# Patient Record
Sex: FEMALE | Race: WHITE | NOT HISPANIC OR LATINO | URBAN - METROPOLITAN AREA
[De-identification: names, ages, dates, MRNs, and addresses within clinical notes are randomized per-mention and may not be internally consistent; named-entity substitution may affect disease eponyms.]

---

## 2017-03-14 ENCOUNTER — OFFICE (AMBULATORY)
Dept: URBAN - METROPOLITAN AREA CLINIC 64 | Facility: CLINIC | Age: 37
End: 2017-03-14
Payer: COMMERCIAL

## 2017-03-14 VITALS
DIASTOLIC BLOOD PRESSURE: 94 MMHG | SYSTOLIC BLOOD PRESSURE: 149 MMHG | HEIGHT: 61 IN | WEIGHT: 135 LBS | HEART RATE: 80 BPM

## 2017-03-14 DIAGNOSIS — R10.84 GENERALIZED ABDOMINAL PAIN: ICD-10-CM

## 2017-03-14 DIAGNOSIS — K21.9 GASTRO-ESOPHAGEAL REFLUX DISEASE WITHOUT ESOPHAGITIS: ICD-10-CM

## 2017-03-14 DIAGNOSIS — R11.2 NAUSEA WITH VOMITING, UNSPECIFIED: ICD-10-CM

## 2017-03-14 PROCEDURE — 99213 OFFICE O/P EST LOW 20 MIN: CPT

## 2017-03-14 RX ORDER — METOCLOPRAMIDE 5 MG/1
TABLET ORAL
Qty: 90 | Status: COMPLETED
End: 2017-03-14

## 2017-03-14 RX ORDER — OMEPRAZOLE 40 MG/1
CAPSULE, DELAYED RELEASE ORAL
Qty: 30 | Refills: 0 | Status: COMPLETED
End: 2020-02-07

## 2017-03-14 RX ORDER — AMITRIPTYLINE HYDROCHLORIDE 25 MG/1
25 TABLET, FILM COATED ORAL
Qty: 30 | Refills: 11 | Status: COMPLETED
Start: 2017-03-14 | End: 2018-01-11

## 2017-03-29 LAB — OCCULT BLOOD, FECAL, IA: NEGATIVE

## 2017-04-06 ENCOUNTER — CONVERSION ENCOUNTER (OUTPATIENT)
Dept: FAMILY MEDICINE CLINIC | Facility: CLINIC | Age: 37
End: 2017-04-06

## 2017-04-10 ENCOUNTER — HOSPITAL ENCOUNTER (OUTPATIENT)
Dept: LAB | Facility: HOSPITAL | Age: 37
Discharge: HOME | End: 2017-04-10
Attending: PODIATRIST | Admitting: PODIATRIST

## 2017-04-10 LAB
BASOPHILS # BLD AUTO: 0 10*3/UL (ref 0–0.2)
BASOPHILS NFR BLD AUTO: 0 % (ref 0–2)
DIFFERENTIAL METHOD BLD: (no result)
EOSINOPHIL # BLD AUTO: 0 % (ref 0–3)
EOSINOPHIL # BLD AUTO: 0.1 10*3/UL (ref 0–0.3)
ERYTHROCYTE [DISTWIDTH] IN BLOOD BY AUTOMATED COUNT: 14.5 % (ref 11.5–14.5)
ERYTHROCYTE [SEDIMENTATION RATE] IN BLOOD BY WESTERGREN METHOD: 38 MM/HR (ref 0–20)
HCT VFR BLD AUTO: 41.2 % (ref 35–49)
HGB BLD-MCNC: 13.7 G/DL (ref 12–15)
LYMPHOCYTES # BLD AUTO: 3.3 10*3/UL (ref 0.8–4.8)
LYMPHOCYTES NFR BLD AUTO: 23 % (ref 18–42)
MCH RBC QN AUTO: 29.6 PG (ref 26–32)
MCHC RBC AUTO-ENTMCNC: 33.1 G/DL (ref 32–36)
MCV RBC AUTO: 89.4 FL (ref 80–94)
MONOCYTES # BLD AUTO: 1.4 10*3/UL (ref 0.1–1.3)
MONOCYTES NFR BLD AUTO: 10 % (ref 2–11)
NEUTROPHILS # BLD AUTO: 9.4 10*3/UL (ref 2.3–8.6)
NEUTROPHILS NFR BLD AUTO: 67 % (ref 50–75)
NRBC BLD AUTO-RTO: 0 /100{WBCS}
NRBC/RBC NFR BLD MANUAL: 0 10*3/UL
PLATELET # BLD AUTO: 397 10*3/UL (ref 150–450)
PMV BLD AUTO: 8.1 FL (ref 7.4–10.4)
RBC # BLD AUTO: 4.61 10*6/UL (ref 4–5.4)
URATE SERPL-MCNC: 3.2 MG/DL (ref 2.6–8)
WBC # BLD AUTO: 14.2 10*3/UL (ref 4.5–11.5)

## 2017-05-16 ENCOUNTER — HOSPITAL ENCOUNTER (OUTPATIENT)
Dept: HOME HEALTH SERVICES | Facility: HOME HEALTHCARE | Age: 37
Setting detail: SPECIMEN
Discharge: HOME | End: 2017-05-16
Attending: INTERNAL MEDICINE | Admitting: INTERNAL MEDICINE

## 2017-05-16 LAB
ANION GAP SERPL CALC-SCNC: 15.4 MMOL/L (ref 10–20)
BASOPHILS # BLD AUTO: 0.1 10*3/UL (ref 0–0.2)
BASOPHILS NFR BLD AUTO: 1 % (ref 0–2)
BUN SERPL-MCNC: 4 MG/DL (ref 8–20)
BUN/CREAT SERPL: 8 (ref 5.4–26.2)
CALCIUM SERPL-MCNC: 9.2 MG/DL (ref 8.9–10.3)
CHLORIDE SERPL-SCNC: 106 MMOL/L (ref 101–111)
CONV CO2: 24 MMOL/L (ref 22–32)
CREAT UR-MCNC: 0.5 MG/DL (ref 0.4–1)
CRP SERPL-MCNC: 0.09 MG/DL (ref 0–0.7)
DIFFERENTIAL METHOD BLD: (no result)
EOSINOPHIL # BLD AUTO: 0.1 10*3/UL (ref 0–0.3)
EOSINOPHIL # BLD AUTO: 1 % (ref 0–3)
ERYTHROCYTE [DISTWIDTH] IN BLOOD BY AUTOMATED COUNT: 15.5 % (ref 11.5–14.5)
ERYTHROCYTE [SEDIMENTATION RATE] IN BLOOD BY WESTERGREN METHOD: 30 MM/HR (ref 0–20)
GLUCOSE SERPL-MCNC: 70 MG/DL (ref 65–99)
HCT VFR BLD AUTO: 35.6 % (ref 35–49)
HGB BLD-MCNC: 12 G/DL (ref 12–15)
LYMPHOCYTES # BLD AUTO: 2.9 10*3/UL (ref 0.8–4.8)
LYMPHOCYTES NFR BLD AUTO: 27 % (ref 18–42)
MCH RBC QN AUTO: 30.5 PG (ref 26–32)
MCHC RBC AUTO-ENTMCNC: 33.7 G/DL (ref 32–36)
MCV RBC AUTO: 90.6 FL (ref 80–94)
MONOCYTES # BLD AUTO: 1.2 10*3/UL (ref 0.1–1.3)
MONOCYTES NFR BLD AUTO: 11 % (ref 2–11)
NEUTROPHILS # BLD AUTO: 6.4 10*3/UL (ref 2.3–8.6)
NEUTROPHILS NFR BLD AUTO: 60 % (ref 50–75)
NRBC BLD AUTO-RTO: 0 /100{WBCS}
NRBC/RBC NFR BLD MANUAL: 0 10*3/UL
PLATELET # BLD AUTO: (no result) 10*3/UL (ref 150–450)
PMV BLD AUTO: 9.3 FL (ref 7.4–10.4)
POTASSIUM SERPL-SCNC: 3.4 MMOL/L (ref 3.6–5.1)
RBC # BLD AUTO: 3.93 10*6/UL (ref 4–5.4)
SODIUM SERPL-SCNC: 142 MMOL/L (ref 136–144)
WBC # BLD AUTO: 10.6 10*3/UL (ref 4.5–11.5)

## 2017-05-22 ENCOUNTER — CONVERSION ENCOUNTER (OUTPATIENT)
Dept: FAMILY MEDICINE CLINIC | Facility: CLINIC | Age: 37
End: 2017-05-22

## 2017-05-23 ENCOUNTER — HOSPITAL ENCOUNTER (OUTPATIENT)
Dept: HOME HEALTH SERVICES | Facility: HOME HEALTHCARE | Age: 37
Setting detail: SPECIMEN
Discharge: HOME | End: 2017-05-23
Attending: INTERNAL MEDICINE | Admitting: INTERNAL MEDICINE

## 2017-05-23 LAB
ANION GAP SERPL CALC-SCNC: 13.4 MMOL/L (ref 10–20)
BASOPHILS # BLD AUTO: 0 10*3/UL (ref 0–0.2)
BASOPHILS NFR BLD AUTO: 1 % (ref 0–2)
BUN SERPL-MCNC: <1 MG/DL (ref 8–20)
BUN/CREAT SERPL: ABNORMAL (ref 5.4–26.2)
CALCIUM SERPL-MCNC: 8.8 MG/DL (ref 8.9–10.3)
CHLORIDE SERPL-SCNC: 107 MMOL/L (ref 101–111)
CONV CO2: 25 MMOL/L (ref 22–32)
CREAT UR-MCNC: 0.4 MG/DL (ref 0.4–1)
CRP SERPL-MCNC: 0.11 MG/DL (ref 0–0.7)
DIFFERENTIAL METHOD BLD: (no result)
EOSINOPHIL # BLD AUTO: 0.1 10*3/UL (ref 0–0.3)
EOSINOPHIL # BLD AUTO: 1 % (ref 0–3)
ERYTHROCYTE [DISTWIDTH] IN BLOOD BY AUTOMATED COUNT: 15.8 % (ref 11.5–14.5)
ERYTHROCYTE [SEDIMENTATION RATE] IN BLOOD BY WESTERGREN METHOD: 17 MM/HR (ref 0–20)
GLUCOSE SERPL-MCNC: 79 MG/DL (ref 65–99)
HCT VFR BLD AUTO: 36.5 % (ref 35–49)
HGB BLD-MCNC: 12.3 G/DL (ref 12–15)
LYMPHOCYTES # BLD AUTO: 2.2 10*3/UL (ref 0.8–4.8)
LYMPHOCYTES NFR BLD AUTO: 33 % (ref 18–42)
MCH RBC QN AUTO: 30.7 PG (ref 26–32)
MCHC RBC AUTO-ENTMCNC: 33.7 G/DL (ref 32–36)
MCV RBC AUTO: 91.1 FL (ref 80–94)
MONOCYTES # BLD AUTO: 0.7 10*3/UL (ref 0.1–1.3)
MONOCYTES NFR BLD AUTO: 11 % (ref 2–11)
NEUTROPHILS # BLD AUTO: 3.7 10*3/UL (ref 2.3–8.6)
NEUTROPHILS NFR BLD AUTO: 54 % (ref 50–75)
NRBC BLD AUTO-RTO: 0 /100{WBCS}
NRBC/RBC NFR BLD MANUAL: 0 10*3/UL
PLATELET # BLD AUTO: 198 10*3/UL (ref 150–450)
PMV BLD AUTO: 9.7 FL (ref 7.4–10.4)
POTASSIUM SERPL-SCNC: 3.4 MMOL/L (ref 3.6–5.1)
RBC # BLD AUTO: 4.01 10*6/UL (ref 4–5.4)
SODIUM SERPL-SCNC: 142 MMOL/L (ref 136–144)
WBC # BLD AUTO: 6.8 10*3/UL (ref 4.5–11.5)

## 2017-05-30 ENCOUNTER — HOSPITAL ENCOUNTER (OUTPATIENT)
Dept: HOME HEALTH SERVICES | Facility: HOME HEALTHCARE | Age: 37
Setting detail: SPECIMEN
Discharge: HOME | End: 2017-05-30
Attending: INTERNAL MEDICINE | Admitting: INTERNAL MEDICINE

## 2017-05-30 LAB
ANION GAP SERPL CALC-SCNC: 16.2 MMOL/L (ref 10–20)
BASOPHILS # BLD AUTO: 0 10*3/UL (ref 0–0.2)
BASOPHILS NFR BLD AUTO: 0 % (ref 0–2)
BUN SERPL-MCNC: 5 MG/DL (ref 8–20)
BUN/CREAT SERPL: 10 (ref 5.4–26.2)
CALCIUM SERPL-MCNC: 9 MG/DL (ref 8.9–10.3)
CHLORIDE SERPL-SCNC: 107 MMOL/L (ref 101–111)
CONV CO2: 24 MMOL/L (ref 22–32)
CREAT UR-MCNC: 0.5 MG/DL (ref 0.4–1)
CRP SERPL-MCNC: 0.08 MG/DL (ref 0–0.7)
DIFFERENTIAL METHOD BLD: (no result)
EOSINOPHIL # BLD AUTO: 0.1 10*3/UL (ref 0–0.3)
EOSINOPHIL # BLD AUTO: 2 % (ref 0–3)
ERYTHROCYTE [DISTWIDTH] IN BLOOD BY AUTOMATED COUNT: 15.7 % (ref 11.5–14.5)
ERYTHROCYTE [SEDIMENTATION RATE] IN BLOOD BY WESTERGREN METHOD: 21 MM/HR (ref 0–20)
GLUCOSE SERPL-MCNC: 105 MG/DL (ref 65–99)
HCT VFR BLD AUTO: 36.2 % (ref 35–49)
HGB BLD-MCNC: 12.1 G/DL (ref 12–15)
LYMPHOCYTES # BLD AUTO: 2.2 10*3/UL (ref 0.8–4.8)
LYMPHOCYTES NFR BLD AUTO: 36 % (ref 18–42)
MCH RBC QN AUTO: 30.8 PG (ref 26–32)
MCHC RBC AUTO-ENTMCNC: 33.5 G/DL (ref 32–36)
MCV RBC AUTO: 92 FL (ref 80–94)
MONOCYTES # BLD AUTO: 0.6 10*3/UL (ref 0.1–1.3)
MONOCYTES NFR BLD AUTO: 9 % (ref 2–11)
NEUTROPHILS # BLD AUTO: 3.2 10*3/UL (ref 2.3–8.6)
NEUTROPHILS NFR BLD AUTO: 53 % (ref 50–75)
NRBC BLD AUTO-RTO: 0 /100{WBCS}
NRBC/RBC NFR BLD MANUAL: 0 10*3/UL
PLATELET # BLD AUTO: 202 10*3/UL (ref 150–450)
PMV BLD AUTO: 9.1 FL (ref 7.4–10.4)
POTASSIUM SERPL-SCNC: 3.2 MMOL/L (ref 3.6–5.1)
RBC # BLD AUTO: 3.94 10*6/UL (ref 4–5.4)
SODIUM SERPL-SCNC: 144 MMOL/L (ref 136–144)
WBC # BLD AUTO: 6.1 10*3/UL (ref 4.5–11.5)

## 2017-06-06 ENCOUNTER — HOSPITAL ENCOUNTER (OUTPATIENT)
Dept: HOME HEALTH SERVICES | Facility: HOME HEALTHCARE | Age: 37
Setting detail: SPECIMEN
Discharge: HOME | End: 2017-06-06
Attending: INTERNAL MEDICINE | Admitting: INTERNAL MEDICINE

## 2017-06-06 LAB
ANION GAP SERPL CALC-SCNC: 14.6 MMOL/L (ref 10–20)
BASOPHILS # BLD AUTO: 0 10*3/UL (ref 0–0.2)
BASOPHILS NFR BLD AUTO: 0 % (ref 0–2)
BUN SERPL-MCNC: 2 MG/DL (ref 8–20)
BUN/CREAT SERPL: 5 (ref 5.4–26.2)
CALCIUM SERPL-MCNC: 9.1 MG/DL (ref 8.9–10.3)
CHLORIDE SERPL-SCNC: 106 MMOL/L (ref 101–111)
CONV CO2: 25 MMOL/L (ref 22–32)
CREAT UR-MCNC: 0.4 MG/DL (ref 0.4–1)
CRP SERPL-MCNC: 0.51 MG/DL (ref 0–0.7)
DIFFERENTIAL METHOD BLD: (no result)
EOSINOPHIL # BLD AUTO: 0.1 10*3/UL (ref 0–0.3)
EOSINOPHIL # BLD AUTO: 1 % (ref 0–3)
ERYTHROCYTE [DISTWIDTH] IN BLOOD BY AUTOMATED COUNT: 15.4 % (ref 11.5–14.5)
ERYTHROCYTE [SEDIMENTATION RATE] IN BLOOD BY WESTERGREN METHOD: 25 MM/HR (ref 0–20)
GLUCOSE SERPL-MCNC: 85 MG/DL (ref 65–99)
HCT VFR BLD AUTO: 36.6 % (ref 35–49)
HGB BLD-MCNC: 12.2 G/DL (ref 12–15)
LYMPHOCYTES # BLD AUTO: 2.1 10*3/UL (ref 0.8–4.8)
LYMPHOCYTES NFR BLD AUTO: 19 % (ref 18–42)
MCH RBC QN AUTO: 30.4 PG (ref 26–32)
MCHC RBC AUTO-ENTMCNC: 33.3 G/DL (ref 32–36)
MCV RBC AUTO: 91.3 FL (ref 80–94)
MONOCYTES # BLD AUTO: 0.9 10*3/UL (ref 0.1–1.3)
MONOCYTES NFR BLD AUTO: 8 % (ref 2–11)
NEUTROPHILS # BLD AUTO: 7.8 10*3/UL (ref 2.3–8.6)
NEUTROPHILS NFR BLD AUTO: 72 % (ref 50–75)
NRBC BLD AUTO-RTO: 0 /100{WBCS}
NRBC/RBC NFR BLD MANUAL: 0 10*3/UL
PLATELET # BLD AUTO: 239 10*3/UL (ref 150–450)
PMV BLD AUTO: 9.1 FL (ref 7.4–10.4)
POTASSIUM SERPL-SCNC: 3.6 MMOL/L (ref 3.6–5.1)
RBC # BLD AUTO: 4.01 10*6/UL (ref 4–5.4)
SODIUM SERPL-SCNC: 142 MMOL/L (ref 136–144)
WBC # BLD AUTO: 10.9 10*3/UL (ref 4.5–11.5)

## 2017-11-09 ENCOUNTER — OFFICE (AMBULATORY)
Dept: URBAN - METROPOLITAN AREA CLINIC 64 | Facility: CLINIC | Age: 37
End: 2017-11-09
Payer: COMMERCIAL

## 2017-11-09 VITALS
HEIGHT: 61 IN | SYSTOLIC BLOOD PRESSURE: 142 MMHG | DIASTOLIC BLOOD PRESSURE: 100 MMHG | HEART RATE: 80 BPM | WEIGHT: 137 LBS

## 2017-11-09 DIAGNOSIS — R10.84 GENERALIZED ABDOMINAL PAIN: ICD-10-CM

## 2017-11-09 DIAGNOSIS — R11.2 NAUSEA WITH VOMITING, UNSPECIFIED: ICD-10-CM

## 2017-11-09 PROCEDURE — 99213 OFFICE O/P EST LOW 20 MIN: CPT | Performed by: NURSE PRACTITIONER

## 2017-11-09 RX ORDER — METOCLOPRAMIDE HYDROCHLORIDE 5 MG/1
10 TABLET ORAL
Qty: 60 | Refills: 3 | Status: COMPLETED
Start: 2017-11-09 | End: 2018-01-11

## 2017-11-09 RX ORDER — ONDANSETRON 4 MG/1
TABLET, ORALLY DISINTEGRATING ORAL
Qty: 60 | Refills: 0 | Status: COMPLETED
End: 2020-02-07

## 2017-11-09 RX ORDER — AMITRIPTYLINE HYDROCHLORIDE 25 MG/1
TABLET, FILM COATED ORAL
Qty: 30 | Refills: 0 | Status: COMPLETED
End: 2020-02-07

## 2018-01-11 ENCOUNTER — OFFICE (AMBULATORY)
Dept: URBAN - METROPOLITAN AREA CLINIC 64 | Facility: CLINIC | Age: 38
End: 2018-01-11
Payer: COMMERCIAL

## 2018-01-11 VITALS
WEIGHT: 145 LBS | HEIGHT: 61 IN | DIASTOLIC BLOOD PRESSURE: 74 MMHG | SYSTOLIC BLOOD PRESSURE: 114 MMHG | HEART RATE: 58 BPM

## 2018-01-11 DIAGNOSIS — K21.9 GASTRO-ESOPHAGEAL REFLUX DISEASE WITHOUT ESOPHAGITIS: ICD-10-CM

## 2018-01-11 DIAGNOSIS — R10.84 GENERALIZED ABDOMINAL PAIN: ICD-10-CM

## 2018-01-11 PROCEDURE — 99213 OFFICE O/P EST LOW 20 MIN: CPT

## 2018-01-11 RX ORDER — METOCLOPRAMIDE 5 MG/1
10 TABLET ORAL
Qty: 60 | Refills: 0 | Status: ACTIVE

## 2018-01-23 ENCOUNTER — HOSPITAL ENCOUNTER (OUTPATIENT)
Dept: OTHER | Facility: HOSPITAL | Age: 38
Discharge: HOME | End: 2018-01-23
Attending: FAMILY MEDICINE | Admitting: FAMILY MEDICINE

## 2018-01-30 ENCOUNTER — HOSPITAL ENCOUNTER (OUTPATIENT)
Dept: LAB | Facility: HOSPITAL | Age: 38
Discharge: HOME | End: 2018-01-30
Attending: FAMILY MEDICINE | Admitting: FAMILY MEDICINE

## 2018-01-30 LAB
ALBUMIN SERPL-MCNC: 3.5 G/DL (ref 3.5–4.8)
ALP SERPL-CCNC: 81 IU/L (ref 32–91)
ALT SERPL-CCNC: 19 IU/L (ref 14–54)
ANION GAP SERPL CALC-SCNC: 10.8 MMOL/L (ref 10–20)
AST SERPL-CCNC: 18 IU/L (ref 15–41)
BACTERIA SPEC AEROBE CULT: NORMAL
BASOPHILS # BLD AUTO: 0 10*3/UL (ref 0–0.2)
BASOPHILS NFR BLD AUTO: 0 % (ref 0–2)
BILIRUB DIRECT SERPL-MCNC: 0 MG/DL (ref 0.1–0.5)
BILIRUB SERPL-MCNC: 0.3 MG/DL (ref 0.3–1.2)
BILIRUB UR QL STRIP: NEGATIVE MG/DL
BUN SERPL-MCNC: 4 MG/DL (ref 8–20)
BUN/CREAT SERPL: 6.7 (ref 5.4–26.2)
CALCIUM SERPL-MCNC: 9.2 MG/DL (ref 8.9–10.3)
CASTS URNS QL MICRO: ABNORMAL /[LPF]
CHLORIDE SERPL-SCNC: 107 MMOL/L (ref 101–111)
CHOLEST SERPL-MCNC: 234 MG/DL
CHOLEST/HDLC SERPL: 5.6 {RATIO}
COLOR UR: ABNORMAL
CONV BACTERIA IN URINE MICRO: ABNORMAL
CONV CLARITY OF URINE: ABNORMAL
CONV CO2: 25 MMOL/L (ref 22–32)
CONV HYALINE CASTS IN URINE MICRO: ABNORMAL /[LPF] (ref 0–5)
CONV LDL CHOLESTEROL DIRECT: 178 MG/DL (ref 0–100)
CONV PROTEIN IN URINE BY AUTOMATED TEST STRIP: NEGATIVE MG/DL
CONV SMALL ROUND CELLS: ABNORMAL /[HPF]
CONV TOTAL PROTEIN: 6.1 G/DL (ref 6.1–7.9)
CONV UROBILINOGEN IN URINE BY AUTOMATED TEST STRIP: 0.2 MG/DL
CREAT UR-MCNC: 0.6 MG/DL (ref 0.4–1)
CRP SERPL-MCNC: 0.74 MG/DL (ref 0–0.7)
CULTURE INDICATED?: ABNORMAL
DIFFERENTIAL METHOD BLD: (no result)
EOSINOPHIL # BLD AUTO: 0.1 10*3/UL (ref 0–0.3)
EOSINOPHIL # BLD AUTO: 1 % (ref 0–3)
ERYTHROCYTE [DISTWIDTH] IN BLOOD BY AUTOMATED COUNT: 16 % (ref 11.5–14.5)
ERYTHROCYTE [SEDIMENTATION RATE] IN BLOOD BY WESTERGREN METHOD: 41 MM/HR (ref 0–20)
GLUCOSE SERPL-MCNC: 95 MG/DL (ref 65–99)
GLUCOSE UR QL: NEGATIVE MG/DL
HCT VFR BLD AUTO: 36 % (ref 35–49)
HDLC SERPL-MCNC: 42 MG/DL
HGB BLD-MCNC: 12 G/DL (ref 12–15)
HGB UR QL STRIP: NEGATIVE
KETONES UR QL STRIP: NEGATIVE MG/DL
LDLC/HDLC SERPL: 4.2 {RATIO}
LEUKOCYTE ESTERASE UR QL STRIP: NEGATIVE
LIPID INTERPRETATION: ABNORMAL
LYMPHOCYTES # BLD AUTO: 2.2 10*3/UL (ref 0.8–4.8)
LYMPHOCYTES NFR BLD AUTO: 27 % (ref 18–42)
Lab: NORMAL
MCH RBC QN AUTO: 29.8 PG (ref 26–32)
MCHC RBC AUTO-ENTMCNC: 33.3 G/DL (ref 32–36)
MCV RBC AUTO: 89.7 FL (ref 80–94)
MICRO REPORT STATUS: NORMAL
MONOCYTES # BLD AUTO: 0.5 10*3/UL (ref 0.1–1.3)
MONOCYTES NFR BLD AUTO: 7 % (ref 2–11)
NEUTROPHILS # BLD AUTO: 5 10*3/UL (ref 2.3–8.6)
NEUTROPHILS NFR BLD AUTO: 65 % (ref 50–75)
NITRITE UR QL STRIP: NEGATIVE
NRBC BLD AUTO-RTO: 0 /100{WBCS}
NRBC/RBC NFR BLD MANUAL: 0 10*3/UL
PH UR STRIP.AUTO: 7.5 [PH] (ref 4.5–8)
PLATELET # BLD AUTO: 297 10*3/UL (ref 150–450)
PMV BLD AUTO: 8.2 FL (ref 7.4–10.4)
POTASSIUM SERPL-SCNC: 3.8 MMOL/L (ref 3.6–5.1)
RBC # BLD AUTO: 4.01 10*6/UL (ref 4–5.4)
RBC #/AREA URNS HPF: 1 /[HPF] (ref 0–3)
SODIUM SERPL-SCNC: 139 MMOL/L (ref 136–144)
SP GR UR: 1.01 (ref 1–1.03)
SPECIMEN SOURCE: NORMAL
SPERM URNS QL MICRO: ABNORMAL /[HPF]
SQUAMOUS SPT QL MICRO: 16 /[HPF] (ref 0–5)
TRIGL SERPL-MCNC: 134 MG/DL
UNIDENT CRYS URNS QL MICRO: ABNORMAL /[HPF]
VLDLC SERPL CALC-MCNC: 14.6 MG/DL
WBC # BLD AUTO: 7.8 10*3/UL (ref 4.5–11.5)
WBC #/AREA URNS HPF: 5 /[HPF] (ref 0–5)
YEAST SPEC QL WET PREP: ABNORMAL /[HPF]

## 2018-01-31 LAB
ANA SER QL IA: NORMAL
CHROMATIN AB SERPL-ACNC: <20 [IU]/ML (ref 0–20)

## 2018-02-07 ENCOUNTER — CONVERSION ENCOUNTER (OUTPATIENT)
Dept: FAMILY MEDICINE CLINIC | Facility: CLINIC | Age: 38
End: 2018-02-07

## 2018-02-13 ENCOUNTER — HOSPITAL ENCOUNTER (OUTPATIENT)
Dept: CARDIOLOGY | Facility: HOSPITAL | Age: 38
Discharge: HOME | End: 2018-02-13
Attending: INTERNAL MEDICINE | Admitting: INTERNAL MEDICINE

## 2018-02-14 ENCOUNTER — HOSPITAL ENCOUNTER (OUTPATIENT)
Dept: LAB | Facility: HOSPITAL | Age: 38
Discharge: HOME | End: 2018-02-14
Attending: FAMILY MEDICINE | Admitting: FAMILY MEDICINE

## 2018-02-14 LAB
ANION GAP SERPL CALC-SCNC: 9.9 MMOL/L (ref 10–20)
BACTERIA SPEC AEROBE CULT: NORMAL
BASOPHILS # BLD AUTO: 0 10*3/UL (ref 0–0.2)
BASOPHILS NFR BLD AUTO: 0 % (ref 0–2)
BILIRUB UR QL STRIP: NEGATIVE MG/DL
BUN SERPL-MCNC: 3 MG/DL (ref 8–20)
BUN/CREAT SERPL: 6 (ref 5.4–26.2)
CALCIUM SERPL-MCNC: 9.5 MG/DL (ref 8.9–10.3)
CASTS URNS QL MICRO: ABNORMAL /[LPF]
CHLORIDE SERPL-SCNC: 108 MMOL/L (ref 101–111)
COLOR UR: YELLOW
CONV BACTERIA IN URINE MICRO: ABNORMAL
CONV CLARITY OF URINE: CLEAR
CONV CO2: 26 MMOL/L (ref 22–32)
CONV HYALINE CASTS IN URINE MICRO: 5 /[LPF] (ref 0–5)
CONV PROTEIN IN URINE BY AUTOMATED TEST STRIP: NEGATIVE MG/DL
CONV SMALL ROUND CELLS: ABNORMAL /[HPF]
CONV UROBILINOGEN IN URINE BY AUTOMATED TEST STRIP: 0.2 MG/DL
CREAT UR-MCNC: 0.5 MG/DL (ref 0.4–1)
CULTURE INDICATED?: ABNORMAL
DIFFERENTIAL METHOD BLD: (no result)
EOSINOPHIL # BLD AUTO: 0.1 10*3/UL (ref 0–0.3)
EOSINOPHIL # BLD AUTO: 1 % (ref 0–3)
ERYTHROCYTE [DISTWIDTH] IN BLOOD BY AUTOMATED COUNT: 16.2 % (ref 11.5–14.5)
GLUCOSE SERPL-MCNC: 92 MG/DL (ref 65–99)
GLUCOSE UR QL: NEGATIVE MG/DL
HCT VFR BLD AUTO: 37.4 % (ref 35–49)
HGB BLD-MCNC: 12.4 G/DL (ref 12–15)
HGB UR QL STRIP: NEGATIVE
KETONES UR QL STRIP: NEGATIVE MG/DL
LEUKOCYTE ESTERASE UR QL STRIP: ABNORMAL
LYMPHOCYTES # BLD AUTO: 2.7 10*3/UL (ref 0.8–4.8)
LYMPHOCYTES NFR BLD AUTO: 24 % (ref 18–42)
Lab: NORMAL
MCH RBC QN AUTO: 30 PG (ref 26–32)
MCHC RBC AUTO-ENTMCNC: 33.2 G/DL (ref 32–36)
MCV RBC AUTO: 90.2 FL (ref 80–94)
MICRO REPORT STATUS: NORMAL
MONOCYTES # BLD AUTO: 0.9 10*3/UL (ref 0.1–1.3)
MONOCYTES NFR BLD AUTO: 8 % (ref 2–11)
NEUTROPHILS # BLD AUTO: 7.6 10*3/UL (ref 2.3–8.6)
NEUTROPHILS NFR BLD AUTO: 67 % (ref 50–75)
NITRITE UR QL STRIP: NEGATIVE
NRBC BLD AUTO-RTO: 0 /100{WBCS}
NRBC/RBC NFR BLD MANUAL: 0 10*3/UL
PH UR STRIP.AUTO: 7 [PH] (ref 4.5–8)
PLATELET # BLD AUTO: 249 10*3/UL (ref 150–450)
PMV BLD AUTO: 8.9 FL (ref 7.4–10.4)
POTASSIUM SERPL-SCNC: 3.9 MMOL/L (ref 3.6–5.1)
RBC # BLD AUTO: 4.15 10*6/UL (ref 4–5.4)
RBC #/AREA URNS HPF: 0 /[HPF] (ref 0–3)
SODIUM SERPL-SCNC: 140 MMOL/L (ref 136–144)
SP GR UR: 1 (ref 1–1.03)
SPECIMEN SOURCE: NORMAL
SPERM URNS QL MICRO: ABNORMAL /[HPF]
SQUAMOUS SPT QL MICRO: 3 /[HPF] (ref 0–5)
UNIDENT CRYS URNS QL MICRO: ABNORMAL /[HPF]
WBC # BLD AUTO: 11.3 10*3/UL (ref 4.5–11.5)
WBC #/AREA URNS HPF: 24 /[HPF] (ref 0–5)
YEAST SPEC QL WET PREP: ABNORMAL /[HPF]

## 2018-02-20 ENCOUNTER — CONVERSION ENCOUNTER (OUTPATIENT)
Dept: FAMILY MEDICINE CLINIC | Facility: CLINIC | Age: 38
End: 2018-02-20

## 2019-05-03 ENCOUNTER — HOSPITAL ENCOUNTER (OUTPATIENT)
Dept: GENERAL RADIOLOGY | Facility: HOSPITAL | Age: 39
Discharge: HOME | End: 2019-05-03
Attending: FAMILY MEDICINE | Admitting: FAMILY MEDICINE

## 2019-06-04 VITALS
HEIGHT: 61 IN | BODY MASS INDEX: 26.62 KG/M2 | DIASTOLIC BLOOD PRESSURE: 83 MMHG | SYSTOLIC BLOOD PRESSURE: 122 MMHG | WEIGHT: 141 LBS | HEART RATE: 58 BPM | HEART RATE: 61 BPM | WEIGHT: 136.4 LBS | BODY MASS INDEX: 27.11 KG/M2 | RESPIRATION RATE: 16 BRPM | HEART RATE: 82 BPM | DIASTOLIC BLOOD PRESSURE: 95 MMHG | SYSTOLIC BLOOD PRESSURE: 159 MMHG | DIASTOLIC BLOOD PRESSURE: 72 MMHG | HEART RATE: 84 BPM | WEIGHT: 136 LBS | DIASTOLIC BLOOD PRESSURE: 85 MMHG | SYSTOLIC BLOOD PRESSURE: 109 MMHG | HEIGHT: 61 IN | OXYGEN SATURATION: 97 % | WEIGHT: 143.6 LBS | OXYGEN SATURATION: 96 % | SYSTOLIC BLOOD PRESSURE: 125 MMHG

## 2020-02-07 ENCOUNTER — OFFICE (AMBULATORY)
Dept: URBAN - METROPOLITAN AREA CLINIC 64 | Facility: CLINIC | Age: 40
End: 2020-02-07
Payer: COMMERCIAL

## 2020-02-07 VITALS
DIASTOLIC BLOOD PRESSURE: 93 MMHG | SYSTOLIC BLOOD PRESSURE: 131 MMHG | HEART RATE: 74 BPM | WEIGHT: 148 LBS | HEIGHT: 61 IN

## 2020-02-07 DIAGNOSIS — K21.9 GASTRO-ESOPHAGEAL REFLUX DISEASE WITHOUT ESOPHAGITIS: ICD-10-CM

## 2020-02-07 DIAGNOSIS — R10.30 LOWER ABDOMINAL PAIN, UNSPECIFIED: ICD-10-CM

## 2020-02-07 DIAGNOSIS — R11.2 NAUSEA WITH VOMITING, UNSPECIFIED: ICD-10-CM

## 2020-02-07 PROCEDURE — 99214 OFFICE O/P EST MOD 30 MIN: CPT | Performed by: NURSE PRACTITIONER

## 2020-02-07 RX ORDER — METOCLOPRAMIDE 10 MG/1
TABLET ORAL
Qty: 60 | Refills: 5 | Status: ACTIVE

## 2020-02-07 RX ORDER — OMEPRAZOLE 40 MG/1
CAPSULE, DELAYED RELEASE ORAL
Qty: 90 | Refills: 3 | Status: ACTIVE

## 2020-02-07 RX ORDER — DICYCLOMINE HYDROCHLORIDE 10 MG/1
40 CAPSULE ORAL
Qty: 60 | Refills: 11 | Status: ACTIVE
Start: 2020-02-07

## 2020-02-24 ENCOUNTER — TRANSCRIBE ORDERS (OUTPATIENT)
Dept: ADMINISTRATIVE | Facility: HOSPITAL | Age: 40
End: 2020-02-24

## 2020-02-24 DIAGNOSIS — Z12.31 SCREENING MAMMOGRAM, ENCOUNTER FOR: Primary | ICD-10-CM

## 2020-03-04 ENCOUNTER — APPOINTMENT (OUTPATIENT)
Dept: MAMMOGRAPHY | Facility: HOSPITAL | Age: 40
End: 2020-03-04

## 2020-03-18 ENCOUNTER — HOSPITAL ENCOUNTER (EMERGENCY)
Facility: HOSPITAL | Age: 40
Discharge: HOME | End: 2020-03-18
Admitting: EMERGENCY MEDICINE

## 2020-03-18 VITALS
WEIGHT: 148.81 LBS | OXYGEN SATURATION: 95 % | HEART RATE: 76 BPM | HEIGHT: 61 IN | BODY MASS INDEX: 28.1 KG/M2 | RESPIRATION RATE: 14 BRPM | DIASTOLIC BLOOD PRESSURE: 73 MMHG | SYSTOLIC BLOOD PRESSURE: 141 MMHG | TEMPERATURE: 98.2 F

## 2020-03-18 DIAGNOSIS — K02.9 PAIN DUE TO DENTAL CARIES: Primary | ICD-10-CM

## 2020-03-18 PROCEDURE — 99282 EMERGENCY DEPT VISIT SF MDM: CPT

## 2020-03-18 RX ORDER — OMEPRAZOLE 40 MG/1
40 CAPSULE, DELAYED RELEASE ORAL DAILY
COMMUNITY

## 2020-03-18 RX ORDER — METOCLOPRAMIDE 10 MG/1
10 TABLET ORAL 2 TIMES DAILY
Status: ON HOLD | COMMUNITY
End: 2022-08-01

## 2020-03-18 RX ORDER — BUDESONIDE AND FORMOTEROL FUMARATE DIHYDRATE 160; 4.5 UG/1; UG/1
2 AEROSOL RESPIRATORY (INHALATION)
Status: ON HOLD | COMMUNITY
End: 2022-08-01

## 2020-03-18 RX ORDER — LIDOCAINE HYDROCHLORIDE 20 MG/ML
10 SOLUTION OROPHARYNGEAL
Qty: 120 ML | Refills: 0 | Status: SHIPPED | OUTPATIENT
Start: 2020-03-18 | End: 2020-10-30

## 2020-03-18 RX ORDER — CLINDAMYCIN HYDROCHLORIDE 300 MG/1
300 CAPSULE ORAL 4 TIMES DAILY
Qty: 28 CAPSULE | Refills: 0 | Status: SHIPPED | OUTPATIENT
Start: 2020-03-18 | End: 2020-10-30

## 2020-03-18 NOTE — ED PROVIDER NOTES
Subjective     Dental Pain   Location:  Generalized  Quality:  Constant  Severity:  Moderate  Onset quality:  Gradual  Timing:  Constant  Progression:  Waxing and waning  Chronicity:  Recurrent  Context: dental caries and poor dentition    Context: not abscess, cap still on, not crown fracture, not dental fracture, not enamel fracture, filling intact, not intrusion, not malocclusion, not recent dental surgery and not trauma    Relieved by:  Nothing  Worsened by:  Cold food/drink  Ineffective treatments:  None tried  Associated symptoms: no congestion, no difficulty swallowing, no drooling, no facial pain, no facial swelling, no fever, no gum swelling, no headaches, no neck pain, no neck swelling, no oral bleeding, no oral lesions and no trismus    Risk factors: smoking        Review of Systems   Constitutional: Negative for fever.   HENT: Negative for congestion, drooling, facial swelling and mouth sores.    Gastrointestinal: Negative for abdominal pain, diarrhea, nausea and vomiting.   Musculoskeletal: Negative for neck pain.   Neurological: Negative for headaches.       History reviewed. No pertinent past medical history.    Allergies   Allergen Reactions   • Aspirin Hives   • Bee Venom Swelling   • Erythromycin Hives   • Penicillin G Hives       History reviewed. No pertinent surgical history.    No family history on file.    Social History     Socioeconomic History   • Marital status:      Spouse name: Not on file   • Number of children: Not on file   • Years of education: Not on file   • Highest education level: Not on file           Objective   Physical Exam   Constitutional: Vital signs are normal. She appears well-developed and well-nourished.   HENT:   Head: Normocephalic and atraumatic.   Right Ear: Hearing, tympanic membrane, external ear and ear canal normal.   Left Ear: Hearing, tympanic membrane, external ear and ear canal normal.   Nose: Nose normal.   Mouth/Throat: Oropharynx is clear and  moist and mucous membranes are normal. Abnormal dentition. Dental caries present. No dental abscesses.   Poor dentition throughout.  Multiple dental caries.  Numerous missing teeth.  No evidence of obvious dental abscess.  No facial erythema.  There is mild right-sided maxillary facial swelling.   Neck: Normal range of motion and full passive range of motion without pain. Neck supple.   Cardiovascular: Normal rate, regular rhythm, S1 normal, S2 normal and normal heart sounds.   Pulmonary/Chest: Effort normal and breath sounds normal.   Skin: Skin is warm, dry and intact.   Psychiatric: She has a normal mood and affect. Her speech is normal and behavior is normal. Judgment and thought content normal. Cognition and memory are normal.       Procedures           ED Course                                           MDM  Number of Diagnoses or Management Options  Pain due to dental caries:   Diagnosis management comments: Patient is an otherwise healthy 39-year-old female who presents to the ED with complaint of dental pain and facial swelling.  She does have extensive dental decay throughout with poor dentition.  There is no erythema noted to her face.  I will cover the patient with clindamycin for early dental abscess, she will also be given lidocaine viscous solution for pain.  Advised her follow-up with her dentist and call for an appointment.  I discussed with the patient h today, plan of care, follow-up and return precautions.  Opportunities provided as questions.  All questions concerns were to the bedside.        Final diagnoses:   Pain due to dental caries            Rosemary Fowler, APRN  03/18/20 1348

## 2020-10-30 ENCOUNTER — HOSPITAL ENCOUNTER (INPATIENT)
Facility: HOSPITAL | Age: 40
LOS: 2 days | Discharge: HOME | End: 2020-11-01
Attending: EMERGENCY MEDICINE | Admitting: INTERNAL MEDICINE

## 2020-10-30 ENCOUNTER — APPOINTMENT (OUTPATIENT)
Dept: CT IMAGING | Facility: HOSPITAL | Age: 40
End: 2020-10-30

## 2020-10-30 ENCOUNTER — APPOINTMENT (OUTPATIENT)
Dept: CARDIOLOGY | Facility: HOSPITAL | Age: 40
End: 2020-10-30

## 2020-10-30 ENCOUNTER — APPOINTMENT (OUTPATIENT)
Dept: GENERAL RADIOLOGY | Facility: HOSPITAL | Age: 40
End: 2020-10-30

## 2020-10-30 ENCOUNTER — APPOINTMENT (OUTPATIENT)
Dept: MRI IMAGING | Facility: HOSPITAL | Age: 40
End: 2020-10-30

## 2020-10-30 DIAGNOSIS — I63.9 CEREBROVASCULAR ACCIDENT (CVA), UNSPECIFIED MECHANISM (HCC): Primary | ICD-10-CM

## 2020-10-30 DIAGNOSIS — I48.91 ATRIAL FIBRILLATION, UNSPECIFIED TYPE (HCC): ICD-10-CM

## 2020-10-30 LAB
ABO GROUP BLD: NORMAL
ALBUMIN SERPL-MCNC: 4.2 G/DL (ref 3.5–5.2)
ALBUMIN/GLOB SERPL: 1.6 G/DL
ALP SERPL-CCNC: 108 U/L (ref 39–117)
ALT SERPL W P-5'-P-CCNC: 28 U/L (ref 1–33)
ANION GAP SERPL CALCULATED.3IONS-SCNC: 12 MMOL/L (ref 5–15)
APTT PPP: 23.4 SECONDS (ref 24–31)
AST SERPL-CCNC: 26 U/L (ref 1–32)
B PARAPERT DNA SPEC QL NAA+PROBE: NOT DETECTED
B PERT DNA SPEC QL NAA+PROBE: NOT DETECTED
BASOPHILS # BLD AUTO: 0.1 10*3/MM3 (ref 0–0.2)
BASOPHILS NFR BLD AUTO: 0.7 % (ref 0–1.5)
BILIRUB SERPL-MCNC: 0.2 MG/DL (ref 0–1.2)
BLD GP AB SCN SERPL QL: NEGATIVE
BUN SERPL-MCNC: 3 MG/DL (ref 6–20)
BUN/CREAT SERPL: 6 (ref 7–25)
C PNEUM DNA NPH QL NAA+NON-PROBE: NOT DETECTED
CALCIUM SPEC-SCNC: 10.1 MG/DL (ref 8.6–10.5)
CHLORIDE SERPL-SCNC: 106 MMOL/L (ref 98–107)
CO2 SERPL-SCNC: 27 MMOL/L (ref 22–29)
CREAT SERPL-MCNC: 0.5 MG/DL (ref 0.57–1)
DEPRECATED RDW RBC AUTO: 53.8 FL (ref 37–54)
EOSINOPHIL # BLD AUTO: 0.1 10*3/MM3 (ref 0–0.4)
EOSINOPHIL NFR BLD AUTO: 0.7 % (ref 0.3–6.2)
ERYTHROCYTE [DISTWIDTH] IN BLOOD BY AUTOMATED COUNT: 17.2 % (ref 12.3–15.4)
FLUAV H1 2009 PAND RNA NPH QL NAA+PROBE: NOT DETECTED
FLUAV H1 HA GENE NPH QL NAA+PROBE: NOT DETECTED
FLUAV H3 RNA NPH QL NAA+PROBE: NOT DETECTED
FLUAV SUBTYP SPEC NAA+PROBE: NOT DETECTED
FLUBV RNA ISLT QL NAA+PROBE: NOT DETECTED
GFR SERPL CREATININE-BSD FRML MDRD: 135 ML/MIN/1.73
GLOBULIN UR ELPH-MCNC: 2.7 GM/DL
GLUCOSE BLDC GLUCOMTR-MCNC: 105 MG/DL (ref 70–105)
GLUCOSE BLDC GLUCOMTR-MCNC: 98 MG/DL (ref 70–105)
GLUCOSE SERPL-MCNC: 94 MG/DL (ref 65–99)
HADV DNA SPEC NAA+PROBE: NOT DETECTED
HCOV 229E RNA SPEC QL NAA+PROBE: NOT DETECTED
HCOV HKU1 RNA SPEC QL NAA+PROBE: NOT DETECTED
HCOV NL63 RNA SPEC QL NAA+PROBE: NOT DETECTED
HCOV OC43 RNA SPEC QL NAA+PROBE: NOT DETECTED
HCT VFR BLD AUTO: 43.6 % (ref 34–46.6)
HGB BLD-MCNC: 14.1 G/DL (ref 12–15.9)
HMPV RNA NPH QL NAA+NON-PROBE: NOT DETECTED
HOLD SPECIMEN: NORMAL
HPIV1 RNA SPEC QL NAA+PROBE: NOT DETECTED
HPIV2 RNA SPEC QL NAA+PROBE: NOT DETECTED
HPIV3 RNA NPH QL NAA+PROBE: NOT DETECTED
HPIV4 P GENE NPH QL NAA+PROBE: NOT DETECTED
INR PPP: <0.93 (ref 0.93–1.1)
LYMPHOCYTES # BLD AUTO: 2.9 10*3/MM3 (ref 0.7–3.1)
LYMPHOCYTES NFR BLD AUTO: 26.2 % (ref 19.6–45.3)
M PNEUMO IGG SER IA-ACNC: NOT DETECTED
MCH RBC QN AUTO: 28.9 PG (ref 26.6–33)
MCHC RBC AUTO-ENTMCNC: 32.3 G/DL (ref 31.5–35.7)
MCV RBC AUTO: 89.4 FL (ref 79–97)
MONOCYTES # BLD AUTO: 0.9 10*3/MM3 (ref 0.1–0.9)
MONOCYTES NFR BLD AUTO: 8.2 % (ref 5–12)
NEUTROPHILS NFR BLD AUTO: 64.2 % (ref 42.7–76)
NEUTROPHILS NFR BLD AUTO: 7.1 10*3/MM3 (ref 1.7–7)
NRBC BLD AUTO-RTO: 0.1 /100 WBC (ref 0–0.2)
PLATELET # BLD AUTO: 315 10*3/MM3 (ref 140–450)
PMV BLD AUTO: 8.4 FL (ref 6–12)
POTASSIUM SERPL-SCNC: 3 MMOL/L (ref 3.5–5.2)
PROT SERPL-MCNC: 6.9 G/DL (ref 6–8.5)
PROTHROMBIN TIME: 9.9 SECONDS (ref 9.6–11.7)
RBC # BLD AUTO: 4.88 10*6/MM3 (ref 3.77–5.28)
RH BLD: POSITIVE
RHINOVIRUS RNA SPEC NAA+PROBE: NOT DETECTED
RSV RNA NPH QL NAA+NON-PROBE: NOT DETECTED
SARS-COV-2 RNA NPH QL NAA+NON-PROBE: NOT DETECTED
SODIUM SERPL-SCNC: 145 MMOL/L (ref 136–145)
T&S EXPIRATION DATE: NORMAL
TROPONIN T SERPL-MCNC: <0.01 NG/ML (ref 0–0.03)
WBC # BLD AUTO: 11 10*3/MM3 (ref 3.4–10.8)
WHOLE BLOOD HOLD SPECIMEN: NORMAL
WHOLE BLOOD HOLD SPECIMEN: NORMAL

## 2020-10-30 PROCEDURE — 0 IOPAMIDOL PER 1 ML: Performed by: EMERGENCY MEDICINE

## 2020-10-30 PROCEDURE — 85025 COMPLETE CBC W/AUTO DIFF WBC: CPT | Performed by: EMERGENCY MEDICINE

## 2020-10-30 PROCEDURE — 86850 RBC ANTIBODY SCREEN: CPT | Performed by: EMERGENCY MEDICINE

## 2020-10-30 PROCEDURE — 99285 EMERGENCY DEPT VISIT HI MDM: CPT

## 2020-10-30 PROCEDURE — 93306 TTE W/DOPPLER COMPLETE: CPT | Performed by: INTERNAL MEDICINE

## 2020-10-30 PROCEDURE — 94640 AIRWAY INHALATION TREATMENT: CPT

## 2020-10-30 PROCEDURE — 94799 UNLISTED PULMONARY SVC/PX: CPT

## 2020-10-30 PROCEDURE — 84484 ASSAY OF TROPONIN QUANT: CPT | Performed by: EMERGENCY MEDICINE

## 2020-10-30 PROCEDURE — 93005 ELECTROCARDIOGRAM TRACING: CPT | Performed by: EMERGENCY MEDICINE

## 2020-10-30 PROCEDURE — 92523 SPEECH SOUND LANG COMPREHEN: CPT

## 2020-10-30 PROCEDURE — 86901 BLOOD TYPING SEROLOGIC RH(D): CPT

## 2020-10-30 PROCEDURE — 82962 GLUCOSE BLOOD TEST: CPT

## 2020-10-30 PROCEDURE — 93306 TTE W/DOPPLER COMPLETE: CPT

## 2020-10-30 PROCEDURE — 3E03317 INTRODUCTION OF OTHER THROMBOLYTIC INTO PERIPHERAL VEIN, PERCUTANEOUS APPROACH: ICD-10-PCS | Performed by: EMERGENCY MEDICINE

## 2020-10-30 PROCEDURE — 80053 COMPREHEN METABOLIC PANEL: CPT | Performed by: EMERGENCY MEDICINE

## 2020-10-30 PROCEDURE — 99222 1ST HOSP IP/OBS MODERATE 55: CPT | Performed by: INTERNAL MEDICINE

## 2020-10-30 PROCEDURE — 25010000002 ALTEPLASE PER 1 MG: Performed by: PSYCHIATRY & NEUROLOGY

## 2020-10-30 PROCEDURE — 99223 1ST HOSP IP/OBS HIGH 75: CPT | Performed by: NURSE PRACTITIONER

## 2020-10-30 PROCEDURE — 71045 X-RAY EXAM CHEST 1 VIEW: CPT

## 2020-10-30 PROCEDURE — 0202U NFCT DS 22 TRGT SARS-COV-2: CPT | Performed by: NURSE PRACTITIONER

## 2020-10-30 PROCEDURE — 86901 BLOOD TYPING SEROLOGIC RH(D): CPT | Performed by: EMERGENCY MEDICINE

## 2020-10-30 PROCEDURE — 86900 BLOOD TYPING SEROLOGIC ABO: CPT

## 2020-10-30 PROCEDURE — 92610 EVALUATE SWALLOWING FUNCTION: CPT

## 2020-10-30 PROCEDURE — 70498 CT ANGIOGRAPHY NECK: CPT

## 2020-10-30 PROCEDURE — 0042T HC CT CEREBRAL PERFUSION W/WO CONTRAST: CPT

## 2020-10-30 PROCEDURE — 25010000002 SULFUR HEXAFLUORIDE MICROSPH 60.7-25 MG RECONSTITUTED SUSPENSION: Performed by: INTERNAL MEDICINE

## 2020-10-30 PROCEDURE — 70450 CT HEAD/BRAIN W/O DYE: CPT

## 2020-10-30 PROCEDURE — 70551 MRI BRAIN STEM W/O DYE: CPT

## 2020-10-30 PROCEDURE — 86900 BLOOD TYPING SEROLOGIC ABO: CPT | Performed by: EMERGENCY MEDICINE

## 2020-10-30 PROCEDURE — 85610 PROTHROMBIN TIME: CPT | Performed by: EMERGENCY MEDICINE

## 2020-10-30 PROCEDURE — 85730 THROMBOPLASTIN TIME PARTIAL: CPT | Performed by: EMERGENCY MEDICINE

## 2020-10-30 PROCEDURE — 70496 CT ANGIOGRAPHY HEAD: CPT

## 2020-10-30 RX ORDER — SODIUM CHLORIDE 9 MG/ML
100 INJECTION, SOLUTION INTRAVENOUS ONCE
Status: COMPLETED | OUTPATIENT
Start: 2020-10-30 | End: 2020-10-30

## 2020-10-30 RX ORDER — ALUMINA, MAGNESIA, AND SIMETHICONE 2400; 2400; 240 MG/30ML; MG/30ML; MG/30ML
15 SUSPENSION ORAL EVERY 6 HOURS PRN
Status: DISCONTINUED | OUTPATIENT
Start: 2020-10-30 | End: 2020-11-01 | Stop reason: HOSPADM

## 2020-10-30 RX ORDER — CLOPIDOGREL BISULFATE 75 MG/1
75 TABLET ORAL DAILY
Status: DISCONTINUED | OUTPATIENT
Start: 2020-10-31 | End: 2020-11-01 | Stop reason: HOSPADM

## 2020-10-30 RX ORDER — ACETAMINOPHEN 650 MG/1
650 SUPPOSITORY RECTAL EVERY 4 HOURS PRN
Status: DISCONTINUED | OUTPATIENT
Start: 2020-10-30 | End: 2020-11-01 | Stop reason: HOSPADM

## 2020-10-30 RX ORDER — PANTOPRAZOLE SODIUM 40 MG/1
40 TABLET, DELAYED RELEASE ORAL EVERY MORNING
Status: DISCONTINUED | OUTPATIENT
Start: 2020-10-31 | End: 2020-11-01 | Stop reason: HOSPADM

## 2020-10-30 RX ORDER — METOCLOPRAMIDE 10 MG/1
10 TABLET ORAL 2 TIMES DAILY
Status: DISCONTINUED | OUTPATIENT
Start: 2020-10-30 | End: 2020-11-01 | Stop reason: HOSPADM

## 2020-10-30 RX ORDER — POTASSIUM CHLORIDE 20 MEQ/1
40 TABLET, EXTENDED RELEASE ORAL AS NEEDED
Status: DISCONTINUED | OUTPATIENT
Start: 2020-10-30 | End: 2020-11-01 | Stop reason: HOSPADM

## 2020-10-30 RX ORDER — SODIUM CHLORIDE 0.9 % (FLUSH) 0.9 %
10 SYRINGE (ML) INJECTION AS NEEDED
Status: DISCONTINUED | OUTPATIENT
Start: 2020-10-30 | End: 2020-11-01 | Stop reason: HOSPADM

## 2020-10-30 RX ORDER — ONDANSETRON 4 MG/1
4 TABLET, FILM COATED ORAL EVERY 6 HOURS PRN
Status: DISCONTINUED | OUTPATIENT
Start: 2020-10-30 | End: 2020-11-01 | Stop reason: HOSPADM

## 2020-10-30 RX ORDER — ONDANSETRON 2 MG/ML
4 INJECTION INTRAMUSCULAR; INTRAVENOUS EVERY 6 HOURS PRN
Status: DISCONTINUED | OUTPATIENT
Start: 2020-10-30 | End: 2020-11-01 | Stop reason: HOSPADM

## 2020-10-30 RX ORDER — SODIUM CHLORIDE 0.9 % (FLUSH) 0.9 %
10 SYRINGE (ML) INJECTION EVERY 12 HOURS SCHEDULED
Status: DISCONTINUED | OUTPATIENT
Start: 2020-10-30 | End: 2020-11-01 | Stop reason: HOSPADM

## 2020-10-30 RX ORDER — ACETAMINOPHEN 325 MG/1
650 TABLET ORAL EVERY 4 HOURS PRN
Status: DISCONTINUED | OUTPATIENT
Start: 2020-10-30 | End: 2020-11-01 | Stop reason: HOSPADM

## 2020-10-30 RX ORDER — POTASSIUM CHLORIDE 7.45 MG/ML
10 INJECTION INTRAVENOUS
Status: DISCONTINUED | OUTPATIENT
Start: 2020-10-30 | End: 2020-11-01 | Stop reason: HOSPADM

## 2020-10-30 RX ORDER — POTASSIUM CHLORIDE 1.5 G/1.77G
40 POWDER, FOR SOLUTION ORAL AS NEEDED
Status: DISCONTINUED | OUTPATIENT
Start: 2020-10-30 | End: 2020-11-01 | Stop reason: HOSPADM

## 2020-10-30 RX ORDER — BUDESONIDE AND FORMOTEROL FUMARATE DIHYDRATE 160; 4.5 UG/1; UG/1
2 AEROSOL RESPIRATORY (INHALATION)
Status: DISCONTINUED | OUTPATIENT
Start: 2020-10-30 | End: 2020-11-01 | Stop reason: HOSPADM

## 2020-10-30 RX ORDER — NITROGLYCERIN 0.4 MG/1
0.4 TABLET SUBLINGUAL
Status: DISCONTINUED | OUTPATIENT
Start: 2020-10-30 | End: 2020-11-01 | Stop reason: HOSPADM

## 2020-10-30 RX ORDER — MAGNESIUM SULFATE HEPTAHYDRATE 40 MG/ML
2 INJECTION, SOLUTION INTRAVENOUS AS NEEDED
Status: DISCONTINUED | OUTPATIENT
Start: 2020-10-30 | End: 2020-11-01 | Stop reason: HOSPADM

## 2020-10-30 RX ORDER — MAGNESIUM SULFATE 1 G/100ML
1 INJECTION INTRAVENOUS AS NEEDED
Status: DISCONTINUED | OUTPATIENT
Start: 2020-10-30 | End: 2020-11-01 | Stop reason: HOSPADM

## 2020-10-30 RX ORDER — IPRATROPIUM BROMIDE AND ALBUTEROL SULFATE 2.5; .5 MG/3ML; MG/3ML
3 SOLUTION RESPIRATORY (INHALATION)
Status: DISCONTINUED | OUTPATIENT
Start: 2020-10-30 | End: 2020-11-01 | Stop reason: HOSPADM

## 2020-10-30 RX ORDER — ALBUTEROL SULFATE 90 UG/1
2 AEROSOL, METERED RESPIRATORY (INHALATION) EVERY 4 HOURS PRN
Status: ON HOLD | COMMUNITY
End: 2022-08-01

## 2020-10-30 RX ORDER — BISACODYL 10 MG
10 SUPPOSITORY, RECTAL RECTAL DAILY PRN
Status: DISCONTINUED | OUTPATIENT
Start: 2020-10-30 | End: 2020-11-01 | Stop reason: HOSPADM

## 2020-10-30 RX ORDER — ATORVASTATIN CALCIUM 40 MG/1
80 TABLET, FILM COATED ORAL NIGHTLY
Status: DISCONTINUED | OUTPATIENT
Start: 2020-10-30 | End: 2020-11-01 | Stop reason: HOSPADM

## 2020-10-30 RX ORDER — ALBUTEROL SULFATE 2.5 MG/3ML
2.5 SOLUTION RESPIRATORY (INHALATION) 4 TIMES DAILY PRN
Status: DISCONTINUED | OUTPATIENT
Start: 2020-10-30 | End: 2020-11-01 | Stop reason: HOSPADM

## 2020-10-30 RX ADMIN — IOPAMIDOL 50 ML: 755 INJECTION, SOLUTION INTRAVENOUS at 11:46

## 2020-10-30 RX ADMIN — Medication 10 ML: at 19:54

## 2020-10-30 RX ADMIN — ATORVASTATIN CALCIUM 80 MG: 40 TABLET, FILM COATED ORAL at 19:52

## 2020-10-30 RX ADMIN — SULFUR HEXAFLUORIDE 2 ML: KIT at 18:15

## 2020-10-30 RX ADMIN — SODIUM CHLORIDE 100 ML: 9 INJECTION, SOLUTION INTRAVENOUS at 13:07

## 2020-10-30 RX ADMIN — IOPAMIDOL 100 ML: 755 INJECTION, SOLUTION INTRAVENOUS at 11:54

## 2020-10-30 RX ADMIN — BUDESONIDE AND FORMOTEROL FUMARATE DIHYDRATE 2 PUFF: 160; 4.5 AEROSOL RESPIRATORY (INHALATION) at 20:37

## 2020-10-30 RX ADMIN — POTASSIUM CHLORIDE 40 MEQ: 1500 TABLET, EXTENDED RELEASE ORAL at 20:30

## 2020-10-30 RX ADMIN — Medication 10 ML: at 12:50

## 2020-10-30 RX ADMIN — ALTEPLASE 60.2 MG: KIT at 11:53

## 2020-10-30 RX ADMIN — Medication 10 ML: at 19:53

## 2020-10-31 ENCOUNTER — APPOINTMENT (OUTPATIENT)
Dept: CT IMAGING | Facility: HOSPITAL | Age: 40
End: 2020-10-31

## 2020-10-31 PROBLEM — I63.412 CEREBROVASCULAR ACCIDENT (CVA) DUE TO EMBOLISM OF LEFT MIDDLE CEREBRAL ARTERY (HCC): Status: ACTIVE | Noted: 2020-10-30

## 2020-10-31 PROBLEM — E53.8 B12 DEFICIENCY: Status: ACTIVE | Noted: 2020-10-31

## 2020-10-31 PROBLEM — I10 ESSENTIAL HYPERTENSION: Status: ACTIVE | Noted: 2020-10-31

## 2020-10-31 LAB
ALBUMIN SERPL-MCNC: 3.4 G/DL (ref 3.5–5.2)
ALBUMIN/GLOB SERPL: 1.5 G/DL
ALP SERPL-CCNC: 92 U/L (ref 39–117)
ALT SERPL W P-5'-P-CCNC: 21 U/L (ref 1–33)
ANION GAP SERPL CALCULATED.3IONS-SCNC: 8 MMOL/L (ref 5–15)
AST SERPL-CCNC: 16 U/L (ref 1–32)
BASOPHILS # BLD AUTO: 0 10*3/MM3 (ref 0–0.2)
BASOPHILS NFR BLD AUTO: 0.5 % (ref 0–1.5)
BILIRUB SERPL-MCNC: <0.2 MG/DL (ref 0–1.2)
BUN SERPL-MCNC: 5 MG/DL (ref 6–20)
BUN/CREAT SERPL: 11.4 (ref 7–25)
CALCIUM SPEC-SCNC: 9.6 MG/DL (ref 8.6–10.5)
CHLORIDE SERPL-SCNC: 108 MMOL/L (ref 98–107)
CHOLEST SERPL-MCNC: 175 MG/DL (ref 0–200)
CO2 SERPL-SCNC: 28 MMOL/L (ref 22–29)
CREAT SERPL-MCNC: 0.44 MG/DL (ref 0.57–1)
DEPRECATED RDW RBC AUTO: 53.8 FL (ref 37–54)
EOSINOPHIL # BLD AUTO: 0.1 10*3/MM3 (ref 0–0.4)
EOSINOPHIL NFR BLD AUTO: 1.1 % (ref 0.3–6.2)
ERYTHROCYTE [DISTWIDTH] IN BLOOD BY AUTOMATED COUNT: 16.9 % (ref 12.3–15.4)
GFR SERPL CREATININE-BSD FRML MDRD: >150 ML/MIN/1.73
GLOBULIN UR ELPH-MCNC: 2.3 GM/DL
GLUCOSE BLDC GLUCOMTR-MCNC: 131 MG/DL (ref 70–105)
GLUCOSE SERPL-MCNC: 102 MG/DL (ref 65–99)
HCT VFR BLD AUTO: 35.5 % (ref 34–46.6)
HDLC SERPL-MCNC: 37 MG/DL (ref 40–60)
HGB BLD-MCNC: 11.5 G/DL (ref 12–15.9)
LDLC SERPL CALC-MCNC: 105 MG/DL (ref 0–100)
LDLC/HDLC SERPL: 2.71 {RATIO}
LYMPHOCYTES # BLD AUTO: 2.3 10*3/MM3 (ref 0.7–3.1)
LYMPHOCYTES NFR BLD AUTO: 32.2 % (ref 19.6–45.3)
MAGNESIUM SERPL-MCNC: 2.5 MG/DL (ref 1.6–2.6)
MCH RBC QN AUTO: 29.1 PG (ref 26.6–33)
MCHC RBC AUTO-ENTMCNC: 32.4 G/DL (ref 31.5–35.7)
MCV RBC AUTO: 89.8 FL (ref 79–97)
MONOCYTES # BLD AUTO: 0.6 10*3/MM3 (ref 0.1–0.9)
MONOCYTES NFR BLD AUTO: 8.7 % (ref 5–12)
NEUTROPHILS NFR BLD AUTO: 4.1 10*3/MM3 (ref 1.7–7)
NEUTROPHILS NFR BLD AUTO: 57.5 % (ref 42.7–76)
NRBC BLD AUTO-RTO: 0.1 /100 WBC (ref 0–0.2)
PHOSPHATE SERPL-MCNC: 3.6 MG/DL (ref 2.5–4.5)
PLATELET # BLD AUTO: 243 10*3/MM3 (ref 140–450)
PMV BLD AUTO: 8.4 FL (ref 6–12)
POTASSIUM SERPL-SCNC: 4.1 MMOL/L (ref 3.5–5.2)
PROT SERPL-MCNC: 5.7 G/DL (ref 6–8.5)
RBC # BLD AUTO: 3.96 10*6/MM3 (ref 3.77–5.28)
SODIUM SERPL-SCNC: 144 MMOL/L (ref 136–145)
TRIGL SERPL-MCNC: 189 MG/DL (ref 0–150)
TSH SERPL DL<=0.05 MIU/L-ACNC: 1.55 UIU/ML (ref 0.27–4.2)
VIT B12 BLD-MCNC: 320 PG/ML (ref 211–946)
VLDLC SERPL-MCNC: 33 MG/DL (ref 5–40)
WBC # BLD AUTO: 7.1 10*3/MM3 (ref 3.4–10.8)

## 2020-10-31 PROCEDURE — 85025 COMPLETE CBC W/AUTO DIFF WBC: CPT | Performed by: NURSE PRACTITIONER

## 2020-10-31 PROCEDURE — 99232 SBSQ HOSP IP/OBS MODERATE 35: CPT | Performed by: INTERNAL MEDICINE

## 2020-10-31 PROCEDURE — 80061 LIPID PANEL: CPT | Performed by: NURSE PRACTITIONER

## 2020-10-31 PROCEDURE — 83036 HEMOGLOBIN GLYCOSYLATED A1C: CPT | Performed by: NURSE PRACTITIONER

## 2020-10-31 PROCEDURE — 83735 ASSAY OF MAGNESIUM: CPT | Performed by: NURSE PRACTITIONER

## 2020-10-31 PROCEDURE — 82607 VITAMIN B-12: CPT | Performed by: NURSE PRACTITIONER

## 2020-10-31 PROCEDURE — 94799 UNLISTED PULMONARY SVC/PX: CPT

## 2020-10-31 PROCEDURE — 80053 COMPREHEN METABOLIC PANEL: CPT | Performed by: NURSE PRACTITIONER

## 2020-10-31 PROCEDURE — 84443 ASSAY THYROID STIM HORMONE: CPT | Performed by: NURSE PRACTITIONER

## 2020-10-31 PROCEDURE — 99222 1ST HOSP IP/OBS MODERATE 55: CPT | Performed by: INTERNAL MEDICINE

## 2020-10-31 PROCEDURE — 99232 SBSQ HOSP IP/OBS MODERATE 35: CPT | Performed by: NURSE PRACTITIONER

## 2020-10-31 PROCEDURE — 97165 OT EVAL LOW COMPLEX 30 MIN: CPT

## 2020-10-31 PROCEDURE — 70450 CT HEAD/BRAIN W/O DYE: CPT

## 2020-10-31 PROCEDURE — 82962 GLUCOSE BLOOD TEST: CPT

## 2020-10-31 PROCEDURE — 97161 PT EVAL LOW COMPLEX 20 MIN: CPT

## 2020-10-31 PROCEDURE — 84100 ASSAY OF PHOSPHORUS: CPT | Performed by: NURSE PRACTITIONER

## 2020-10-31 RX ORDER — LANOLIN ALCOHOL/MO/W.PET/CERES
1000 CREAM (GRAM) TOPICAL DAILY
Status: DISCONTINUED | OUTPATIENT
Start: 2020-10-31 | End: 2020-11-01 | Stop reason: HOSPADM

## 2020-10-31 RX ORDER — AMLODIPINE BESYLATE 5 MG/1
5 TABLET ORAL
Status: DISCONTINUED | OUTPATIENT
Start: 2020-10-31 | End: 2020-11-01 | Stop reason: HOSPADM

## 2020-10-31 RX ADMIN — BUDESONIDE AND FORMOTEROL FUMARATE DIHYDRATE 2 PUFF: 160; 4.5 AEROSOL RESPIRATORY (INHALATION) at 10:22

## 2020-10-31 RX ADMIN — POTASSIUM CHLORIDE 40 MEQ: 1500 TABLET, EXTENDED RELEASE ORAL at 00:59

## 2020-10-31 RX ADMIN — METOCLOPRAMIDE 10 MG: 10 TABLET ORAL at 08:57

## 2020-10-31 RX ADMIN — AMLODIPINE BESYLATE 5 MG: 5 TABLET ORAL at 08:57

## 2020-10-31 RX ADMIN — Medication 10 ML: at 20:03

## 2020-10-31 RX ADMIN — BUDESONIDE AND FORMOTEROL FUMARATE DIHYDRATE 2 PUFF: 160; 4.5 AEROSOL RESPIRATORY (INHALATION) at 20:32

## 2020-10-31 RX ADMIN — CYANOCOBALAMIN TAB 1000 MCG 1000 MCG: 1000 TAB at 14:00

## 2020-10-31 RX ADMIN — PANTOPRAZOLE SODIUM 40 MG: 40 TABLET, DELAYED RELEASE ORAL at 06:25

## 2020-10-31 RX ADMIN — ATORVASTATIN CALCIUM 80 MG: 40 TABLET, FILM COATED ORAL at 20:03

## 2020-10-31 RX ADMIN — CLOPIDOGREL BISULFATE 75 MG: 75 TABLET ORAL at 08:57

## 2020-10-31 RX ADMIN — Medication 10 ML: at 08:57

## 2020-10-31 RX ADMIN — BUDESONIDE AND FORMOTEROL FUMARATE DIHYDRATE 2 PUFF: 160; 4.5 AEROSOL RESPIRATORY (INHALATION) at 05:26

## 2020-11-01 ENCOUNTER — APPOINTMENT (OUTPATIENT)
Dept: RESPIRATORY THERAPY | Facility: HOSPITAL | Age: 40
End: 2020-11-01

## 2020-11-01 VITALS
HEART RATE: 68 BPM | DIASTOLIC BLOOD PRESSURE: 79 MMHG | WEIGHT: 151 LBS | TEMPERATURE: 97.8 F | SYSTOLIC BLOOD PRESSURE: 147 MMHG | RESPIRATION RATE: 16 BRPM | HEIGHT: 61 IN | OXYGEN SATURATION: 92 % | BODY MASS INDEX: 28.51 KG/M2

## 2020-11-01 LAB
ALBUMIN SERPL-MCNC: 3.7 G/DL (ref 3.5–5.2)
ALBUMIN/GLOB SERPL: 1.5 G/DL
ALP SERPL-CCNC: 97 U/L (ref 39–117)
ALT SERPL W P-5'-P-CCNC: 24 U/L (ref 1–33)
AMPHET+METHAMPHET UR QL: NEGATIVE
ANION GAP SERPL CALCULATED.3IONS-SCNC: 9 MMOL/L (ref 5–15)
AST SERPL-CCNC: 18 U/L (ref 1–32)
BARBITURATES UR QL SCN: NEGATIVE
BASOPHILS # BLD AUTO: 0 10*3/MM3 (ref 0–0.2)
BASOPHILS NFR BLD AUTO: 0.5 % (ref 0–1.5)
BENZODIAZ UR QL SCN: NEGATIVE
BH CV ECHO MEAS - ACS: 1.7 CM
BH CV ECHO MEAS - AO MAX PG (FULL): 4.1 MMHG
BH CV ECHO MEAS - AO MAX PG: 7.5 MMHG
BH CV ECHO MEAS - AO MEAN PG (FULL): 2.3 MMHG
BH CV ECHO MEAS - AO MEAN PG: 3.8 MMHG
BH CV ECHO MEAS - AO ROOT AREA (BSA CORRECTED): 1.5
BH CV ECHO MEAS - AO ROOT AREA: 4.9 CM^2
BH CV ECHO MEAS - AO ROOT DIAM: 2.5 CM
BH CV ECHO MEAS - AO V2 MAX: 136.5 CM/SEC
BH CV ECHO MEAS - AO V2 MEAN: 88.6 CM/SEC
BH CV ECHO MEAS - AO V2 VTI: 26 CM
BH CV ECHO MEAS - AORTIC HR: 65.1 BPM
BH CV ECHO MEAS - AORTIC R-R: 0.92 SEC
BH CV ECHO MEAS - ASC AORTA: 2.4 CM
BH CV ECHO MEAS - AVA(I,A): 1.9 CM^2
BH CV ECHO MEAS - AVA(I,D): 1.9 CM^2
BH CV ECHO MEAS - AVA(V,A): 1.9 CM^2
BH CV ECHO MEAS - AVA(V,D): 1.9 CM^2
BH CV ECHO MEAS - BSA(HAYCOCK): 1.7 M^2
BH CV ECHO MEAS - BSA: 1.7 M^2
BH CV ECHO MEAS - BZI_BMI: 28.5 KILOGRAMS/M^2
BH CV ECHO MEAS - BZI_METRIC_HEIGHT: 154.9 CM
BH CV ECHO MEAS - BZI_METRIC_WEIGHT: 68.5 KG
BH CV ECHO MEAS - CI(AO): 4.9 L/MIN/M^2
BH CV ECHO MEAS - CI(LVOT): 1.9 L/MIN/M^2
BH CV ECHO MEAS - CO(AO): 8.3 L/MIN
BH CV ECHO MEAS - CO(LVOT): 3.2 L/MIN
BH CV ECHO MEAS - EDV(CUBED): 49.7 ML
BH CV ECHO MEAS - EDV(MOD-SP4): 58.5 ML
BH CV ECHO MEAS - EDV(TEICH): 57.2 ML
BH CV ECHO MEAS - EF(CUBED): 69.7 %
BH CV ECHO MEAS - EF(MOD-BP): 65 %
BH CV ECHO MEAS - EF(MOD-SP4): 64.5 %
BH CV ECHO MEAS - EF(TEICH): 62.2 %
BH CV ECHO MEAS - ESV(CUBED): 15 ML
BH CV ECHO MEAS - ESV(MOD-SP4): 20.8 ML
BH CV ECHO MEAS - ESV(TEICH): 21.6 ML
BH CV ECHO MEAS - FS: 32.9 %
BH CV ECHO MEAS - IVS/LVPW: 1.3
BH CV ECHO MEAS - IVSD: 1.4 CM
BH CV ECHO MEAS - LA DIMENSION(2D): 3.2 CM
BH CV ECHO MEAS - LV DIASTOLIC VOL/BSA (35-75): 34.9 ML/M^2
BH CV ECHO MEAS - LV MASS(C)D: 148 GRAMS
BH CV ECHO MEAS - LV MASS(C)DI: 88.3 GRAMS/M^2
BH CV ECHO MEAS - LV MAX PG: 3.4 MMHG
BH CV ECHO MEAS - LV MEAN PG: 1.5 MMHG
BH CV ECHO MEAS - LV SYSTOLIC VOL/BSA (12-30): 12.4 ML/M^2
BH CV ECHO MEAS - LV V1 MAX: 91.8 CM/SEC
BH CV ECHO MEAS - LV V1 MEAN: 55.7 CM/SEC
BH CV ECHO MEAS - LV V1 VTI: 17.6 CM
BH CV ECHO MEAS - LVIDD: 3.7 CM
BH CV ECHO MEAS - LVIDS: 2.5 CM
BH CV ECHO MEAS - LVOT AREA: 2.8 CM^2
BH CV ECHO MEAS - LVOT DIAM: 1.9 CM
BH CV ECHO MEAS - LVPWD: 1 CM
BH CV ECHO MEAS - MV A MAX VEL: 54 CM/SEC
BH CV ECHO MEAS - MV DEC SLOPE: 430.6 CM/SEC^2
BH CV ECHO MEAS - MV DEC TIME: 0.17 SEC
BH CV ECHO MEAS - MV E MAX VEL: 72.8 CM/SEC
BH CV ECHO MEAS - MV E/A: 1.3
BH CV ECHO MEAS - MV MAX PG: 4.3 MMHG
BH CV ECHO MEAS - MV MEAN PG: 1.8 MMHG
BH CV ECHO MEAS - MV V2 MAX: 103.5 CM/SEC
BH CV ECHO MEAS - MV V2 MEAN: 62.3 CM/SEC
BH CV ECHO MEAS - MV V2 VTI: 23 CM
BH CV ECHO MEAS - MVA(VTI): 2.2 CM^2
BH CV ECHO MEAS - PA ACC TIME: 0.12 SEC
BH CV ECHO MEAS - PA MAX PG (FULL): 1.6 MMHG
BH CV ECHO MEAS - PA MAX PG: 5 MMHG
BH CV ECHO MEAS - PA MEAN PG (FULL): 1.1 MMHG
BH CV ECHO MEAS - PA MEAN PG: 2.6 MMHG
BH CV ECHO MEAS - PA PR(ACCEL): 25.9 MMHG
BH CV ECHO MEAS - PA V2 MAX: 111.3 CM/SEC
BH CV ECHO MEAS - PA V2 MEAN: 72.7 CM/SEC
BH CV ECHO MEAS - PA V2 VTI: 24.1 CM
BH CV ECHO MEAS - PULM A REVS DUR: 0.12 SEC
BH CV ECHO MEAS - PULM A REVS VEL: 31.4 CM/SEC
BH CV ECHO MEAS - PULM DIAS VEL: 38.2 CM/SEC
BH CV ECHO MEAS - PULM S/D: 1.4
BH CV ECHO MEAS - PULM SYS VEL: 52.1 CM/SEC
BH CV ECHO MEAS - RAP SYSTOLE: 3 MMHG
BH CV ECHO MEAS - RV MAX PG: 3.4 MMHG
BH CV ECHO MEAS - RV MEAN PG: 1.5 MMHG
BH CV ECHO MEAS - RV V1 MAX: 92 CM/SEC
BH CV ECHO MEAS - RV V1 MEAN: 56.2 CM/SEC
BH CV ECHO MEAS - RV V1 VTI: 20.3 CM
BH CV ECHO MEAS - RVDD: 1.8 CM
BH CV ECHO MEAS - RVSP: 17.7 MMHG
BH CV ECHO MEAS - SI(AO): 75.8 ML/M^2
BH CV ECHO MEAS - SI(CUBED): 20.7 ML/M^2
BH CV ECHO MEAS - SI(LVOT): 29.5 ML/M^2
BH CV ECHO MEAS - SI(MOD-SP4): 22.5 ML/M^2
BH CV ECHO MEAS - SI(TEICH): 21.2 ML/M^2
BH CV ECHO MEAS - SV(AO): 127 ML
BH CV ECHO MEAS - SV(CUBED): 34.6 ML
BH CV ECHO MEAS - SV(LVOT): 49.5 ML
BH CV ECHO MEAS - SV(MOD-SP4): 37.7 ML
BH CV ECHO MEAS - SV(TEICH): 35.6 ML
BH CV ECHO MEAS - TR MAX VEL: 191.5 CM/SEC
BILIRUB SERPL-MCNC: 0.2 MG/DL (ref 0–1.2)
BUN SERPL-MCNC: 7 MG/DL (ref 6–20)
BUN/CREAT SERPL: 14.6 (ref 7–25)
CALCIUM SPEC-SCNC: 9.7 MG/DL (ref 8.6–10.5)
CANNABINOIDS SERPL QL: POSITIVE
CHLORIDE SERPL-SCNC: 106 MMOL/L (ref 98–107)
CO2 SERPL-SCNC: 27 MMOL/L (ref 22–29)
COCAINE UR QL: NEGATIVE
CREAT SERPL-MCNC: 0.48 MG/DL (ref 0.57–1)
DEPRECATED RDW RBC AUTO: 52.9 FL (ref 37–54)
EOSINOPHIL # BLD AUTO: 0.1 10*3/MM3 (ref 0–0.4)
EOSINOPHIL NFR BLD AUTO: 0.8 % (ref 0.3–6.2)
ERYTHROCYTE [DISTWIDTH] IN BLOOD BY AUTOMATED COUNT: 16.8 % (ref 12.3–15.4)
GFR SERPL CREATININE-BSD FRML MDRD: 142 ML/MIN/1.73
GLOBULIN UR ELPH-MCNC: 2.5 GM/DL
GLUCOSE SERPL-MCNC: 98 MG/DL (ref 65–99)
HCT VFR BLD AUTO: 38.4 % (ref 34–46.6)
HGB BLD-MCNC: 12.1 G/DL (ref 12–15.9)
LYMPHOCYTES # BLD AUTO: 2.4 10*3/MM3 (ref 0.7–3.1)
LYMPHOCYTES NFR BLD AUTO: 28.3 % (ref 19.6–45.3)
MAGNESIUM SERPL-MCNC: 2.4 MG/DL (ref 1.6–2.6)
MCH RBC QN AUTO: 28.6 PG (ref 26.6–33)
MCHC RBC AUTO-ENTMCNC: 31.6 G/DL (ref 31.5–35.7)
MCV RBC AUTO: 90.6 FL (ref 79–97)
METHADONE UR QL SCN: NEGATIVE
MONOCYTES # BLD AUTO: 0.7 10*3/MM3 (ref 0.1–0.9)
MONOCYTES NFR BLD AUTO: 8.2 % (ref 5–12)
NEUTROPHILS NFR BLD AUTO: 5.2 10*3/MM3 (ref 1.7–7)
NEUTROPHILS NFR BLD AUTO: 62.2 % (ref 42.7–76)
NRBC BLD AUTO-RTO: 0.1 /100 WBC (ref 0–0.2)
OPIATES UR QL: NEGATIVE
OXYCODONE UR QL SCN: NEGATIVE
PHOSPHATE SERPL-MCNC: 3.4 MG/DL (ref 2.5–4.5)
PLATELET # BLD AUTO: 269 10*3/MM3 (ref 140–450)
PMV BLD AUTO: 8.2 FL (ref 6–12)
POTASSIUM SERPL-SCNC: 4 MMOL/L (ref 3.5–5.2)
PROT SERPL-MCNC: 6.2 G/DL (ref 6–8.5)
QT INTERVAL: 445 MS
RBC # BLD AUTO: 4.23 10*6/MM3 (ref 3.77–5.28)
SODIUM SERPL-SCNC: 142 MMOL/L (ref 136–145)
WBC # BLD AUTO: 8.3 10*3/MM3 (ref 3.4–10.8)

## 2020-11-01 PROCEDURE — 80053 COMPREHEN METABOLIC PANEL: CPT | Performed by: NURSE PRACTITIONER

## 2020-11-01 PROCEDURE — 94799 UNLISTED PULMONARY SVC/PX: CPT

## 2020-11-01 PROCEDURE — 85025 COMPLETE CBC W/AUTO DIFF WBC: CPT | Performed by: NURSE PRACTITIONER

## 2020-11-01 PROCEDURE — 80307 DRUG TEST PRSMV CHEM ANLYZR: CPT | Performed by: INTERNAL MEDICINE

## 2020-11-01 PROCEDURE — 83735 ASSAY OF MAGNESIUM: CPT | Performed by: NURSE PRACTITIONER

## 2020-11-01 PROCEDURE — 99233 SBSQ HOSP IP/OBS HIGH 50: CPT | Performed by: NURSE PRACTITIONER

## 2020-11-01 PROCEDURE — 84100 ASSAY OF PHOSPHORUS: CPT | Performed by: NURSE PRACTITIONER

## 2020-11-01 PROCEDURE — 99238 HOSP IP/OBS DSCHRG MGMT 30/<: CPT | Performed by: INTERNAL MEDICINE

## 2020-11-01 PROCEDURE — 99232 SBSQ HOSP IP/OBS MODERATE 35: CPT | Performed by: INTERNAL MEDICINE

## 2020-11-01 PROCEDURE — 93270 REMOTE 30 DAY ECG REV/REPORT: CPT

## 2020-11-01 RX ORDER — DIPHENHYDRAMINE HYDROCHLORIDE 50 MG/ML
25 INJECTION INTRAMUSCULAR; INTRAVENOUS ONCE
Status: DISCONTINUED | OUTPATIENT
Start: 2020-11-01 | End: 2020-11-01 | Stop reason: HOSPADM

## 2020-11-01 RX ORDER — CLOPIDOGREL BISULFATE 75 MG/1
75 TABLET ORAL DAILY
Qty: 30 TABLET | Refills: 0 | Status: ON HOLD | OUTPATIENT
Start: 2020-11-02 | End: 2022-08-01

## 2020-11-01 RX ORDER — LISINOPRIL 20 MG/1
20 TABLET ORAL
Status: DISCONTINUED | OUTPATIENT
Start: 2020-11-01 | End: 2020-11-01 | Stop reason: HOSPADM

## 2020-11-01 RX ORDER — BUTALBITAL, ACETAMINOPHEN AND CAFFEINE 50; 325; 40 MG/1; MG/1; MG/1
1 TABLET ORAL 2 TIMES DAILY PRN
Status: DISCONTINUED | OUTPATIENT
Start: 2020-11-01 | End: 2020-11-01 | Stop reason: HOSPADM

## 2020-11-01 RX ORDER — ATORVASTATIN CALCIUM 80 MG/1
80 TABLET, FILM COATED ORAL NIGHTLY
Qty: 30 TABLET | Refills: 0 | Status: SHIPPED | OUTPATIENT
Start: 2020-11-01 | End: 2020-12-01

## 2020-11-01 RX ORDER — PROCHLORPERAZINE EDISYLATE 5 MG/ML
10 INJECTION INTRAMUSCULAR; INTRAVENOUS ONCE
Status: DISCONTINUED | OUTPATIENT
Start: 2020-11-01 | End: 2020-11-01 | Stop reason: HOSPADM

## 2020-11-01 RX ORDER — LISINOPRIL 20 MG/1
20 TABLET ORAL
Qty: 30 TABLET | Refills: 0 | Status: ON HOLD | OUTPATIENT
Start: 2020-11-02 | End: 2022-08-01

## 2020-11-01 RX ORDER — AMLODIPINE BESYLATE 5 MG/1
5 TABLET ORAL
Qty: 30 TABLET | Refills: 0 | Status: ON HOLD | OUTPATIENT
Start: 2020-11-02 | End: 2022-08-01

## 2020-11-01 RX ADMIN — BUDESONIDE AND FORMOTEROL FUMARATE DIHYDRATE 2 PUFF: 160; 4.5 AEROSOL RESPIRATORY (INHALATION) at 10:23

## 2020-11-01 RX ADMIN — CYANOCOBALAMIN TAB 1000 MCG 1000 MCG: 1000 TAB at 09:30

## 2020-11-01 RX ADMIN — ACETAMINOPHEN 650 MG: 325 TABLET, FILM COATED ORAL at 11:51

## 2020-11-01 RX ADMIN — LISINOPRIL 20 MG: 20 TABLET ORAL at 16:42

## 2020-11-01 RX ADMIN — CLOPIDOGREL BISULFATE 75 MG: 75 TABLET ORAL at 09:30

## 2020-11-01 RX ADMIN — AMLODIPINE BESYLATE 5 MG: 5 TABLET ORAL at 09:30

## 2020-11-01 RX ADMIN — PANTOPRAZOLE SODIUM 40 MG: 40 TABLET, DELAYED RELEASE ORAL at 06:39

## 2020-11-01 RX ADMIN — Medication 10 ML: at 09:31

## 2020-11-01 NOTE — PROGRESS NOTES
CARDIOLOGY FOLLOW-UP PROGRESS NOTE      Reason for follow-up:    Acute CVA with multiple left frontal lobe infarcts-suggestive of cardioembolic etiology  Status post alteplase therapy with minimal neurologic deficits  Essential hypertension  COPD with longstanding tobacco use     Attending: Jose Guadalupe Reyes MD      SUBJECTIVE:    Overall looks very good with no significant residual neurologic deficits.  She denies any right-sided weakness, dysarthria or other specific complaints.  Denies chest pain, shortness of air, palpitations, dizziness, lightheadedness or other constitutional complaints.     Review of Systems-no overall changes  General: denies fever, chills, anorexia, weight loss  Eyes: denies blurring, diplopia  Ear/Nose/Throat: denies ear pain, nosebleeds, hoarseness  Cardiovascular: See HPI  Respiratory: denies excessive sputum, hemoptysis, wheezing  Gastrointestinal: denies nausea, vomiting, change in bowel habits, abdominal pain  Genitourinary: denies dysuria and hematuria  Musculoskeletal: denies back pain, joint pain, joint swelling, muscle cramps, weakness  Skin: denies rashes, itching, suspicious lesions  Neurologic: denies focal neuro deficits  Psychiatric: denies depression, anxiety  Endocrine: denies cold intolerance, heat intolerance  Hematologic/Lymphatic: denies abnormal bruising, bleeding  Allergic/Immunologic: denies urticaria or persistent infections      Allergies: Aspirin, Bee venom, Erythromycin, and Penicillin g    Scheduled Meds:amLODIPine, 5 mg, Oral, Q24H  atorvastatin, 80 mg, Oral, Nightly  budesonide-formoterol, 2 puff, Inhalation, BID - RT  clopidogrel, 75 mg, Oral, Daily  diphenhydrAMINE, 25 mg, Intravenous, Once  lisinopril, 20 mg, Oral, Q24H  metoclopramide, 10 mg, Oral, BID  pantoprazole, 40 mg, Oral, QAM  prochlorperazine, 10 mg, Intravenous, Once  sodium chloride, 10 mL, Intravenous, Q12H  sodium chloride, 10 mL, Intravenous, Q12H  vitamin B-12, 1,000 mcg, Oral,  Daily        Continuous Infusions:     PRN Meds:.•  acetaminophen **OR** acetaminophen  •  albuterol  •  aluminum-magnesium hydroxide-simethicone  •  bisacodyl  •  butalbital-acetaminophen-caffeine  •  ipratropium-albuterol  •  magnesium hydroxide  •  magnesium sulfate **OR** magnesium sulfate in D5W 1g/100mL (PREMIX)  •  nitroglycerin  •  ondansetron **OR** ondansetron  •  potassium chloride **OR** potassium chloride **OR** potassium chloride  •  sodium chloride  •  sodium chloride  •  sodium chloride    Objective     Vital Signs  Vitals:    11/01/20 0345 11/01/20 0800 11/01/20 1023 11/01/20 1200   BP: 162/93      Pulse: 65  60    Resp:   16    Temp:  97.8 °F (36.6 °C)  97.8 °F (36.6 °C)   TempSrc:  Oral  Oral   SpO2: 95%  93%    Weight:       Height:         Wt Readings from Last 1 Encounters:   10/30/20 68.5 kg (151 lb)       Physical Exam:     General Appearance:    Alert, oriented, no acute distress   Neck:  Supple without JVD or bruit  HEENT: Very poor dentition with abdominal disease   Lungs:    Grossly clear with adequate airflow bilaterally    Heart:    Regular rate and rhythm, normal S1 and S2, no            murmur, no gallop, no rub, no click   Chest Wall/Thorax:    No abnormalities observed   Abdomen:     Normal bowel sounds, no masses, no organomegaly, soft        non-tender, non-distended, no guarding, no rebound                tenderness   Extremities:  Range of motion adequate, no edema, no cyanosis, no             redness   Pulses:   Pulses palpable and equal bilaterally   Skin:   No bleeding, bruising or rash   Neurologic:  No sensory or motor deficits and no dysarthria appreciated               Results Review:     CBC    Results from last 7 days   Lab Units 11/01/20  0410 10/31/20  0425 10/30/20  1143   WBC 10*3/mm3 8.30 7.10 11.00*   HEMOGLOBIN g/dL 12.1 11.5* 14.1   PLATELETS 10*3/mm3 269 243 315     BMP   Results from last 7 days   Lab Units 11/01/20  0410 10/31/20  0425 10/30/20  1143   SODIUM  mmol/L 142 144 145   POTASSIUM mmol/L 4.0 4.1 3.0*   CHLORIDE mmol/L 106 108* 106   CO2 mmol/L 27.0 28.0 27.0   BUN mg/dL 7 5* 3*   CREATININE mg/dL 0.48* 0.44* 0.50*   GLUCOSE mg/dL 98 102* 94   MAGNESIUM mg/dL 2.4 2.5  --    PHOSPHORUS mg/dL 3.4 3.6  --      Radiology(recent) Ct Head Without Contrast    Result Date: 10/31/2020  1.No CT findings of acute hemorrhage at this time. 2.Small left frontal infarcts seen on MRI are not clearly visible on this exam.  Electronically Signed By-DR. Joesph Luciano MD On:10/31/2020 1:11 PM This report was finalized on [id number] by DR. Joesph Luciano MD.      Cardiac Studies:  Transthoracic echo pending    Medication Review:   Scheduled Meds:amLODIPine, 5 mg, Oral, Q24H  atorvastatin, 80 mg, Oral, Nightly  budesonide-formoterol, 2 puff, Inhalation, BID - RT  clopidogrel, 75 mg, Oral, Daily  diphenhydrAMINE, 25 mg, Intravenous, Once  lisinopril, 20 mg, Oral, Q24H  metoclopramide, 10 mg, Oral, BID  pantoprazole, 40 mg, Oral, QAM  prochlorperazine, 10 mg, Intravenous, Once  sodium chloride, 10 mL, Intravenous, Q12H  sodium chloride, 10 mL, Intravenous, Q12H  vitamin B-12, 1,000 mcg, Oral, Daily      Continuous Infusions:   PRN Meds:.•  acetaminophen **OR** acetaminophen  •  albuterol  •  aluminum-magnesium hydroxide-simethicone  •  bisacodyl  •  butalbital-acetaminophen-caffeine  •  ipratropium-albuterol  •  magnesium hydroxide  •  magnesium sulfate **OR** magnesium sulfate in D5W 1g/100mL (PREMIX)  •  nitroglycerin  •  ondansetron **OR** ondansetron  •  potassium chloride **OR** potassium chloride **OR** potassium chloride  •  sodium chloride  •  sodium chloride  •  sodium chloride    Assessment:  Acute CVA with multiple left frontal lobe infarcts-suggestive of cardioembolic etiology  Status post alteplase therapy with minimal neurologic deficits  Essential hypertension  COPD with longstanding tobacco use      Plan:  Now started on on antiplatelet therapy with  clopidogrel.  Patient reports allergic reaction to ASA, consequently will not be started.  I am adding lisinopril 20 mg p.o. daily for antihypertensive regulation  We will place 30-day event monitor.  Otherwise, appears hemodynamically stable well compensated and symptom-free.  Satisfactory condition for discharge home from my standpoint.  I will see for outpatient follow-up in 4 to 6 weeks, or sooner if needed.    I discussed the patients findings and my recommendations with patient and bedside nurse for coordination of care      SANTOSH Bedolla MD  11/01/20  14:42 EST      This report was generated using the Dragon voice recognition system.

## 2020-11-01 NOTE — PROGRESS NOTES
[facility] PROGRESS NOTE     VIET  Sunitha Louie is a 40 y.o. female who presented to the emergency department today for evaluation of right-sided weakness.  She reports ports that she was in her usual state of health when she woke up this morning.  At approximately 9:30 AM this morning the patient noticed that she had developed some slurred speech as well as weakness in her right arm and hand.  She reports that she was unable to  or hold objects without dropping them.  The time EMS arrived the patient had also developed a right-sided facial droop.  Arrival to the emergency department the patient underwent a code stroke and was taken immediately to CT where she was evaluated by neurology.  Initial CT of the head was without any obvious acute intracranial findings.  CTA did not reveal obvious large vessel occlusion.  CT cerebral perfusion revealed normal cerebral perfusion.  After risk/benefit of thrombolysis was discussed with the patient per neurology tPA was administered at 11:51 AM.  Follow-up MRI of the brain revealed small acute infarctions within the lateral left frontal lobe, no hemorrhagic transformation or significant mass-effect, minimal chronic small vessel ischemic disease.     The patient denies that she has had fever, chills, nausea, vomiting, shortness of air, chest pain, dizziness, palpitations, headache, or vision changes.  She had no exacerbating or alleviating factors for her symptoms.  Respiratory panel and COVID-19 swab were negative.     The patient was admitted to the ICU per post tPA protocol       SUBJECTIVE    10/31: The patient reports she feels much better today.  Right arm weakness has nearly resolved.  No drift noted.  No shortness of air or chest pain.  No headache or vision changes.  Tolerating room air  11/1: Patient reports she feels better and slept well.  Right-sided weakness almost resolved.  Tolerating room air.  No shortness of air or chest pain.    OBJECTIVE     Vitals:    11/01/20 0006 11/01/20 0011 11/01/20 0300 11/01/20 0345   BP: 148/72   162/93   Pulse: 63   65   Resp:       Temp:  97.6 °F (36.4 °C) 98.1 °F (36.7 °C)    TempSrc:  Oral Oral    SpO2: 91%   95%   Weight:       Height:          Intake/Output last 3 shifts:  I/O last 3 completed shifts:  In: 2200 [P.O.:2200]  Out: 1000 [Urine:1000]  Intake/Output this shift:  No intake/output data recorded.    Constitutional:  Well developed, well nourished, no acute distress, non-toxic appearance   Eyes:  PERRL, conjunctivae normal   HENT:  Atraumatic, external ears normal, nose normal. Neck- normal range of motion, no tenderness, supple   Respiratory:  No respiratory distress, normal breath sounds, no rales, no wheezing   Cardiovascular:  Normal rate, normal rhythm, no murmurs, no gallops, no rubs   GI:  Soft, nondistended, normal bowel sounds, nontender, no rebound or guarding  : Deferred  Musculoskeletal:  No edema, no tenderness, no deformities  Integument:  Well hydrated, no rash   Neurologic:  Alert & oriented x 3.    Very slight weakness of the right upper extremity, no drift noted.  No weakness of the right lower extremity.  No facial droop.    Psychiatric:  Speech and behavior appropriate        Scheduled Meds:amLODIPine, 5 mg, Oral, Q24H  atorvastatin, 80 mg, Oral, Nightly  budesonide-formoterol, 2 puff, Inhalation, BID - RT  clopidogrel, 75 mg, Oral, Daily  metoclopramide, 10 mg, Oral, BID  pantoprazole, 40 mg, Oral, QAM  sodium chloride, 10 mL, Intravenous, Q12H  sodium chloride, 10 mL, Intravenous, Q12H  vitamin B-12, 1,000 mcg, Oral, Daily        Continuous Infusions:     PRN Meds:•  acetaminophen **OR** acetaminophen  •  albuterol  •  aluminum-magnesium hydroxide-simethicone  •  bisacodyl  •  ipratropium-albuterol  •  magnesium hydroxide  •  magnesium sulfate **OR** magnesium sulfate in D5W 1g/100mL (PREMIX)  •  nitroglycerin  •  ondansetron **OR** ondansetron  •  potassium chloride **OR** potassium  chloride **OR** potassium chloride  •  sodium chloride  •  sodium chloride  •  sodium chloride     LABS    CBC  Results from last 7 days   Lab Units 11/01/20  0410 10/31/20  0425 10/30/20  1143   WBC 10*3/mm3 8.30 7.10 11.00*   RBC 10*6/mm3 4.23 3.96 4.88   HEMOGLOBIN g/dL 12.1 11.5* 14.1   HEMATOCRIT % 38.4 35.5 43.6   MCV fL 90.6 89.8 89.4   PLATELETS 10*3/mm3 269 243 315       CMP   Results from last 7 days   Lab Units 11/01/20  0410 10/31/20  0425 10/30/20  1143   SODIUM mmol/L 142 144 145   POTASSIUM mmol/L 4.0 4.1 3.0*   CHLORIDE mmol/L 106 108* 106   CO2 mmol/L 27.0 28.0 27.0   BUN mg/dL 7 5* 3*   CREATININE mg/dL 0.48* 0.44* 0.50*   GLUCOSE mg/dL 98 102* 94   ALBUMIN g/dL 3.70 3.40* 4.20   BILIRUBIN mg/dL 0.2 <0.2 0.2   ALK PHOS U/L 97 92 108   AST (SGOT) U/L 18 16 26   ALT (SGPT) U/L 24 21 28       TROPONIN  Results from last 7 days   Lab Units 10/30/20  1143   TROPONIN T ng/mL <0.010       CoAg  Results from last 7 days   Lab Units 10/30/20  1143   INR  <0.93*   APTT seconds 23.4*       ABG        Microbiology  Microbiology Results (last 10 days)     Procedure Component Value - Date/Time    COVID PRE-OP / PRE-PROCEDURE SCREENING ORDER (NO ISOLATION) - Swab, Nasopharynx [[id number]]  (Normal) Collected: 10/30/20 1435    Lab Status: Final result Specimen: Swab from Nasopharynx Updated: 10/30/20 1641    Narrative:      The following orders were created for panel order COVID PRE-OP / PRE-PROCEDURE SCREENING ORDER (NO ISOLATION) - Swab, Nasopharynx.  Procedure                               Abnormality         Status                     ---------                               -----------         ------                     Respiratory Panel PCR w/...[[id number]]  Normal              Final result                 Please view results for these tests on the individual orders.    Respiratory Panel PCR w/COVID-19(SARS-CoV-2) [facility] In-House, NP Swab in UTM/VTM, 3-4 HR TAT - Swab, Nasopharynx  [[id number]]  (Normal) Collected: 10/30/20 1435    Lab Status: Final result Specimen: Swab from Nasopharynx Updated: 10/30/20 1641     ADENOVIRUS, PCR Not Detected     Coronavirus 229E Not Detected     Coronavirus HKU1 Not Detected     Coronavirus NL63 Not Detected     Coronavirus OC43 Not Detected     COVID19 Not Detected     Human Metapneumovirus Not Detected     Human Rhinovirus/Enterovirus Not Detected     Influenza A PCR Not Detected     Influenza A H1 Not Detected     Influenza A H1 2009 PCR Not Detected     Influenza A H3 Not Detected     Influenza B PCR Not Detected     Parainfluenza Virus 1 Not Detected     Parainfluenza Virus 2 Not Detected     Parainfluenza Virus 3 Not Detected     Parainfluenza Virus 4 Not Detected     RSV, PCR Not Detected     Bordetella pertussis pcr Not Detected     Bordetella parapertussis PCR Not Detected     Chlamydophila pneumoniae PCR Not Detected     Mycoplasma pneumo by PCR Not Detected    Narrative:      Fact sheet for providers: https://docs.[employer]/wp-content/uploads/QCD3256-6584-GG0.1-EUA-Provider-Fact-Sheet-3.pdf    Fact sheet for patients: https://docs.[employer]/wp-content/uploads/KYG6683-0612-MQ8.1-EUA-Patient-Fact-Sheet-1.pdf          IMAGING & OTHER STUDIES    Imaging Results (Last 72 Hours)     Procedure Component Value Units Date/Time    CT Head Without Contrast [[id number]] Collected: 10/31/20 1306     Updated: 10/31/20 1314    Narrative:         DATE OF EXAM:  10/31/2020 12:35 PM     PROCEDURE:   CT HEAD WO CONTRAST-     INDICATIONS:   Stroke, follow up; I63.9-Cerebral infarction, unspecified     COMPARISON:  CT head 10/30/2020, MRI 10/30/2020.     TECHNIQUE:   Routine transaxial cuts were obtained through the head without the  administration of contrast. Automated exposure control and iterative  reconstruction methods were used.      FINDINGS:  No midline shift. Basal cisterns appear patent.  Ventricles and sulci  appear within normal limits for patient  age.     No findings of acute hemorrhage or extra-axial collection. No definite  mass or focal edema is identified.  No definite CT evidence of an acute  infarction is seen. The small infarcts in the left frontal lobe seen on  MRI are not clearly visible on this exam.     Probable left frontal mucus retention cyst, as before.  Mastoid air  cells appear clear.  No acute calvarial abnormality is identified.       Impression:      1.No CT findings of acute hemorrhage at this time.  2.Small left frontal infarcts seen on MRI are not clearly visible on  this exam.     Electronically Signed By-DR. Joesph Luciano MD On:10/31/2020 1:11 PM  This report was finalized on [id number] by DR. Joesph Luciano MD.    MRI Brain Without Contrast [[id number]] Collected: 10/30/20 1421     Updated: 10/30/20 1430    Narrative:      MRI BRAIN WO CONTRAST-     Date of Exam: 10/30/2020 2:00 PM     Indication: Stroke, follow up.     Comparison Exams: CT head from earlier today     Technique: MRI brain without IV contrast     FINDINGS:  There are small foci of acute infarctions within the lateral left  frontal lobe. There is no hemorrhagic transformation. The ventricles are  stable in caliber, with no midline shift. The basal cisterns appear  patent. Subtle foci of periventricular and subcortical white matter  FLAIR hyperintensities are nonspecific, but likely the sequela of  minimal chronic small vessel ischemic disease.     The midline structures appear intact. The globes and orbits appear  intact. The intracranial vascular flow voids appear patent. There is a  moderate mucous retention cyst within the left frontal sinus.       Impression:      1.Small acute infarctions within lateral left frontal lobe. No  hemorrhagic transformation or significant mass effect.  2.Findings suggestive of minimal chronic small vessel ischemic disease.  3.Moderate mucous retention cyst within left frontal sinus.     Electronically Signed By-DR. Sharan Ball,  MD On:10/30/2020 2:28 PM  This report was finalized on [id number] by DR. Sharan Ball MD.    CT Angiogram Neck [[id number]] Collected: 10/30/20 1248     Updated: 10/30/20 1305    Narrative:         DATE OF EXAM:  10/30/2020 11:45 AM     PROCEDURE:  CT ANGIOGRAM NECK-, CT ANGIOGRAM HEAD-     INDICATIONS:   Stroke, follow up     COMPARISON:   CT head from earlier today     TECHNIQUE:  CTA of the head and CTA of the neck were performed after the intravenous  administration of 100 mL Isovue 370. Reconstructed coronal and sagittal  images were also obtained. In addition, a 3 D volume rendered image was  obtained after post processing. Automated exposure control and iterative  reconstruction methods were used.      FINDINGS:  VASCULAR FINDINGS: There is a three-vessel aortic arch. The right common  carotid artery is widely patent. The right carotid bifurcation is widely  patent. The right internal carotid artery is widely patent. The left  common carotid artery is widely patent. The left carotid bifurcation is  widely patent. The left internal carotid artery is widely patent. The  bilateral vertebral artery origins are widely patent. The left vertebral  artery is dominant. The bilateral vertebral arteries are widely patent.     The bilateral middle cerebral, bilateral anterior cerebral, and anterior  communicating arteries are widely patent. Note is made of an absent A1  segment of the left anterior cerebral artery, with both anterior  cerebral arteries being fed via the right internal carotid artery,  normal variant. The basilar and bilateral posterior cerebral arteries  are widely patent. No definite posterior communicating artery is  identified on either side. No intracranial aneurysm is identified. The  visualized portions of the bilateral superior cerebellar, anterior  inferior cerebellar, and posterior inferior cerebellar arteries are  unremarkable.     NONVASCULAR FINDINGS: Within the visualized upper lungs  is mild linear  atelectasis in the left upper lobe. There are a couple subcentimeter  left thyroid nodules, which are nonspecific. There is mild paranasal  sinus mucosal disease.          Impression:      Major arterial vasculature within head and neck appears widely patent,  with no hemodynamically significant stenosis, dissection, thrombus, or  aneurysm.     Electronically Signed By-DR. Sharan Ball MD On:10/30/2020 1:03 PM  This report was finalized on [id number] by DR. Sharan Ball MD.    CT Angiogram Head [[id number]] Collected: 10/30/20 1248     Updated: 10/30/20 1305    Narrative:         DATE OF EXAM:  10/30/2020 11:45 AM     PROCEDURE:  CT ANGIOGRAM NECK-, CT ANGIOGRAM HEAD-     INDICATIONS:   Stroke, follow up     COMPARISON:   CT head from earlier today     TECHNIQUE:  CTA of the head and CTA of the neck were performed after the intravenous  administration of 100 mL Isovue 370. Reconstructed coronal and sagittal  images were also obtained. In addition, a 3 D volume rendered image was  obtained after post processing. Automated exposure control and iterative  reconstruction methods were used.      FINDINGS:  VASCULAR FINDINGS: There is a three-vessel aortic arch. The right common  carotid artery is widely patent. The right carotid bifurcation is widely  patent. The right internal carotid artery is widely patent. The left  common carotid artery is widely patent. The left carotid bifurcation is  widely patent. The left internal carotid artery is widely patent. The  bilateral vertebral artery origins are widely patent. The left vertebral  artery is dominant. The bilateral vertebral arteries are widely patent.     The bilateral middle cerebral, bilateral anterior cerebral, and anterior  communicating arteries are widely patent. Note is made of an absent A1  segment of the left anterior cerebral artery, with both anterior  cerebral arteries being fed via the right internal carotid artery,  normal  variant. The basilar and bilateral posterior cerebral arteries  are widely patent. No definite posterior communicating artery is  identified on either side. No intracranial aneurysm is identified. The  visualized portions of the bilateral superior cerebellar, anterior  inferior cerebellar, and posterior inferior cerebellar arteries are  unremarkable.     NONVASCULAR FINDINGS: Within the visualized upper lungs is mild linear  atelectasis in the left upper lobe. There are a couple subcentimeter  left thyroid nodules, which are nonspecific. There is mild paranasal  sinus mucosal disease.          Impression:      Major arterial vasculature within head and neck appears widely patent,  with no hemodynamically significant stenosis, dissection, thrombus, or  aneurysm.     Electronically Signed By-DR. Sharan Ball MD On:10/30/2020 1:03 PM  This report was finalized on [id number] by DR. Sharan Ball MD.    XR Chest 1 View [[id number]] Collected: 10/30/20 1219     Updated: 10/30/20 1222    Narrative:      DATE OF EXAM:  10/30/2020 11:55 AM     PROCEDURE:  XR CHEST 1 VW-     INDICATIONS:  Acute Stroke Protocol (Onset < 12 hrs)     COMPARISON:  No Comparisons Available     TECHNIQUE:   Single radiographic AP view of the chest was obtained.     FINDINGS:  Cardiac size is normal. The patient has developed some patchy airspace  disease in the left base. The right lung appears clear.        Impression:      Patchy left lower lobe airspace disease suggestive of pneumonia.     Electronically Signed By-Anthony Durbin On:10/30/2020 12:20 PM  This report was finalized on [id number] by  Anthony Durbin, .    CT Cerebral Perfusion With & Without Contrast [[id number]] Collected: 10/30/20 1159     Updated: 10/30/20 1205    Narrative:         DATE OF EXAM:  10/30/2020 11:43 AM     PROCEDURE:   CT CEREBRAL PERFUSION W WO CONTRAST-     INDICATIONS:   Cerebral ischemia     COMPARISON:  No Comparisons Available     TECHNIQUE:   Routine  transaxial cuts were obtained through the head without  administration of  contrast. Routine transaxial cuts were then obtained  through the head following the administration of 50 mL of Isovue 370  intravenously. Core blood volume, core blood flow, mean transit time,  and Tmax images were obtained utilizing the Rapid software protocol. A  limited CT angiogram of the head was also performed to measure the blood  vessel density.      FINDINGS:  The intracranial cerebral perfusion is normal.        Impression:      Normal cerebral perfusion.     Electronically Signed By-DR. Sharan Ball MD On:10/30/2020 12:03 PM  This report was finalized on [id number] by DR. Sharan Ball MD.    CT Head Without Contrast Stroke Protocol [[id number]] Collected: 10/30/20 1140     Updated: 10/30/20 1143    Narrative:      CT HEAD WO CONTRAST STROKE PROTOCOL-     Date of Exam: 10/30/2020 11:32 AM     Indication: Stroke, follow up.  Right arm weakness. Facial droop.     Comparison: CT head without contrast 05/24/2017     Technique:  Without contrast, contiguous axial CT images of the head  were obtained from skull base to vertex.  Coronal and sagittal  reconstructions were performed.  Automated exposure control and  iterative reconstruction methods were used.     FINDINGS  No evidence of intracranial hemorrhage, mass, or midline shift.  Sulci  and ventricles are symmetric.  Brain volume is appropriate for patient's  age.  The gray white matter differentiation is intact. Mild ethmoid  sinus mucosal thickening. Mucous retention cyst or polyp in the left  frontal sinus. Mastoid air cells are clear. Globes and extraocular  muscles are normal.  No displaced or depressed skull fractures.  No  suspicious lytic or sclerotic osseous lesions.       Impression:      Paranasal sinus disease. No acute intracranial findings.     Electronically Signed By-Dr. January Patel MD On:10/30/2020 11:41 AM  This report was finalized on [id number]  by Dr. January Patel MD.                ASSESSMENT/PLAN:     Acute CVA    -received tPA at 1151 on 10/30/20  -Neurology following, stroke work-up in progress.  Etiology is concerning  -tPA protocol, monitor in ICU for at least 12 hours, then JENNIFER  -NIH monitoring per TPA protocol  -CT head reviewed with no acute findings  -CTA head and neck reviewed: No obvious large vessel occlusion  -Normal cerebral perfusion  -MRI brain reviewed,  small acute infarctions within the lateral left frontal lobe   -Repeat head CT 10/31/2020 - for acute findings  -Echo results  reviewed, no cardioembolic etiology identified, negative agitated saline contrast bubble study  -A1C pending  -B12  320  -Lipid panel reviewed  -TSH  1.55  -History of aspirin allergy.    -Started Plavix 75 mg  -Statin: Lipitor 40  -Cardiology consulted for NISHA to evaluate for etiology of possible embolic stroke     Tobacco abuse  -Current, everyday smoker     COPD, not in acute exacerbation  -continue Albuterol HFA and Symbicort     Hyperlipidemia  -Statin continued  -Lipid panel reviewed    Hypertension, new diagnosis  -Add Norvasc  -Recommend outpatient follow-up with primary care for chronic management     GERD  -continue PPI     DVT ppy s/p tPA  PUD ppy PPI    JENNIFER overflow. Hospitalist following for medical management      See orders. The plan was discussed with the patient      Attending physician statement:  Above note scribed by nurse practitioner for me and later reviewed for accuracy. I've examined the patient and reviewed all labs and images.   I have directly participated in the evaluation and management of this patient.  Jose Guadalupe Reyes MD  Pulmonary and [facility]  [facility]

## 2020-11-01 NOTE — PLAN OF CARE
Goal Outcome Evaluation:  Plan of Care Reviewed With: patient  Progress: improving   Patient has rested well though out the night. She has been on 2 L/NC while sleeping. VSS. Will continue to monitor.

## 2020-11-01 NOTE — PROGRESS NOTES
LOS: 2 days     Chief Complaint:  Right sided weakness       SUBJECTIVE:  History taken from: patient chart RN    Interval History: Ms. Louie is a 40-year-old female that presented to the emergency department around 9:30 am on 10/30 after onset of right facial droop and right arm weakness.  She denies any history of stroke or TIA type symptoms.  She also states that her speech is not normal.  Code stroke was initiated on arrival to [facility] and pt reported her right arm weakness felt worse. CT head completed without any obvious acute findings.  CTA reviewed and discussed with Dr. Ball, no obvious large vessel occlusion seen. Pt agreed to and received tPA on 10/30 at 11:51am.   - Portions of the above HPI were copied from previous encounters and edited as appropriate.      Patient Complaints: Right sided weakness and speech issues have resolved. Pt feels well.       Review of Systems   Constitutional: Negative for chills, diaphoresis and fatigue.   Eyes: Negative for photophobia and visual disturbance.   Neurological: Negative for dizziness, tremors, seizures, syncope, facial asymmetry, speech difficulty, weakness, light-headedness, numbness and headaches.       Pertinent PMH:  has a past medical history of Asthma, COPD (chronic obstructive pulmonary disease) (CMS/[facility]), GERD (gastroesophageal reflux disease), and Hyperlipidemia.   ________________________________________________     OBJECTIVE:  Pt examined at . She is awake and alert. Oriented x3.       Neurologic Exam    On exam:  GENERAL: NAD, awake and alert  HEENT: Normocephalic, Atraumatic. Oropharynx clear and moist.   RESP: Breathing unlabored, breath sounds normal  CARDIO: RRR  SKIN: Warm, dry. No apparent rash.   PSYCH: Mood/affect normal    NEURO:  Oriented x3  EOMI, PERRL, no visual field deficits  No facial asymmetry  Speech clear without dysarthria  Sensations intact and equal bilaterally  Strength 5/5 and equal in all  extremities.  Slight bradykinesia on the right  No ataxia    ________________________________________________   RESULTS REVIEW    VITAL SIGNS:  Temp:  [97.5 °F (36.4 °C)-98.1 °F (36.7 °C)] 97.8 °F (36.6 °C)  Heart Rate:  [60-93] 60  Resp:  [16] 16  BP: (108-162)/() 162/93    LABS:   Lab Results   Component Value Date    WBC 8.30 11/01/2020    HGB 12.1 11/01/2020    HCT 38.4 11/01/2020    MCV 90.6 11/01/2020     11/01/2020     Lab Results   Component Value Date    GLUCOSE 98 11/01/2020    BUN 7 11/01/2020    CREATININE 0.48 (L) 11/01/2020    EGFRIFNONA 142 11/01/2020    BCR 14.6 11/01/2020    K 4.0 11/01/2020    CO2 27.0 11/01/2020    CALCIUM 9.7 11/01/2020    ALBUMIN 3.70 11/01/2020    LABIL2 0.9 (L) 04/20/2017    AST 18 11/01/2020    ALT 24 11/01/2020       Lab Results   Component Value Date    TSH 1.550 10/31/2020     (H) 10/31/2020    GDTEJHSE86 320 10/31/2020         IMAGING STUDIES:  Ct Head Without Contrast    Result Date: 10/31/2020  1.No CT findings of acute hemorrhage at this time. 2.Small left frontal infarcts seen on MRI are not clearly visible on this exam.  Electronically Signed By-DR. Joesph Luciano MD On:10/31/2020 1:11 PM This report was finalized on [id number] by DR. Joesph Luciano MD.    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 No hemorrhagic transformation or significant mass effect. 2.Findings suggestive of minimal chronic small vessel ischemic disease. 3.Moderate mucous retention cyst within left frontal sinus.  Electronically Signed By-DR. Sharan Ball MD On:10/30/2020 2:28 PM This report was finalized on [id number] by DR. Sharan Ball MD.      I reviewed the patient's new clinical results.    ________________________________________________      PROBLEM LIST:    Cerebrovascular accident (CVA) due to embolism of left middle cerebral artery (CMS/HCC)    Essential hypertension    B12  deficiency    Hyperlipidemia        Assessment/Plan   ASSESSMENT/PLAN:  1.  Multiple acute small ischemic strokes within the left frontal lobe.  The strokes are within the left MCA territory and are concerning for embolic source. Patient received alteplase at 1151am on 10/30/20.   - Echo: EF 61 - 65%. Negative agitated saline contrast bubble study for ASD/VSD shunting. No cardioembolic etiology identified by echo exam  - Labs: A1C: P, B12: 320, LDL: 105, TSH: 1.55  - Antithrombotics:  Patient has allergy to aspirin, start Plavix 75 mg  - Statin: Lipitor 80  - Cardiology consulted to evaluate source of embolic stroke: Plans for NISHA and possible loop recorder placement early next week.    2. HTN  - Strict blood pressure control.  - Recommend goal BP <130/80 with caution to avoid hypotension.    3. B12 Deficiency  - Replacement ordered  - Recommend cyanocobalamin 1000mcg PO daily or IM monthly    **Please refer to previous notes for further details and recommendations.     Will follow cardiac recommendations. Otherwise, pt is okay to discharge from neuro standpoint.     I discussed the patients findings and my recommendations with patient, nursing staff and consulting provider    AYDEN Rivas  11/01/20  12:02 EST

## 2020-11-01 NOTE — PROGRESS NOTES
CARDIOLOGY FOLLOW-UP PROGRESS NOTE      Reason for follow-up:    Acute CVA with multiple left frontal lobe infarcts-suggestive of cardioembolic etiology  Status post alteplase therapy with minimal neurologic deficits  Essential hypertension  COPD with longstanding tobacco use     Attending: Jose Guadalupe Reyes MD      SUBJECTIVE:    Overall looks very good with no significant residual neurologic deficits.  She denies any right-sided weakness, dysarthria or other specific complaints     Review of Systems   General: denies fever, chills, anorexia, weight loss  Eyes: denies blurring, diplopia  Ear/Nose/Throat: denies ear pain, nosebleeds, hoarseness  Cardiovascular: See HPI  Respiratory: denies excessive sputum, hemoptysis, wheezing  Gastrointestinal: denies nausea, vomiting, change in bowel habits, abdominal pain  Genitourinary: denies dysuria and hematuria  Musculoskeletal: denies back pain, joint pain, joint swelling, muscle cramps, weakness  Skin: denies rashes, itching, suspicious lesions  Neurologic: denies focal neuro deficits  Psychiatric: denies depression, anxiety  Endocrine: denies cold intolerance, heat intolerance  Hematologic/Lymphatic: denies abnormal bruising, bleeding  Allergic/Immunologic: denies urticaria or persistent infections      Allergies: Aspirin, Bee venom, Erythromycin, and Penicillin g    Scheduled Meds:amLODIPine, 5 mg, Oral, Q24H  atorvastatin, 80 mg, Oral, Nightly  budesonide-formoterol, 2 puff, Inhalation, BID - RT  clopidogrel, 75 mg, Oral, Daily  metoclopramide, 10 mg, Oral, BID  pantoprazole, 40 mg, Oral, QAM  sodium chloride, 10 mL, Intravenous, Q12H  sodium chloride, 10 mL, Intravenous, Q12H  vitamin B-12, 1,000 mcg, Oral, Daily        Continuous Infusions:     PRN Meds:.•  acetaminophen **OR** acetaminophen  •  albuterol  •  aluminum-magnesium hydroxide-simethicone  •  bisacodyl  •  ipratropium-albuterol  •  magnesium hydroxide  •  magnesium sulfate **OR** magnesium sulfate in  D5W 1g/100mL (PREMIX)  •  nitroglycerin  •  ondansetron **OR** ondansetron  •  potassium chloride **OR** potassium chloride **OR** potassium chloride  •  sodium chloride  •  sodium chloride  •  sodium chloride    Objective     Vital Signs  Vitals:    10/31/20 1959 10/31/20 2000 10/31/20 2032 10/31/20 2034   BP: (!) 141/115 131/77     Pulse:  65 69 65   Resp:   16 16   Temp:  97.8 °F (36.6 °C)     TempSrc:  Oral     SpO2:  93% 96% 93%   Weight:       Height:         Wt Readings from Last 1 Encounters:   10/30/20 68.5 kg (151 lb)       Physical Exam:     General Appearance:    Alert, oriented, no acute distress   Neck:  Supple without JVD or bruit  HEENT: Very poor dentition with abdominal disease   Lungs:    Grossly clear with adequate airflow bilaterally    Heart:    Regular rate and rhythm, normal S1 and S2, no            murmur, no gallop, no rub, no click   Chest Wall/Thorax:    No abnormalities observed   Abdomen:     Normal bowel sounds, no masses, no organomegaly, soft        non-tender, non-distended, no guarding, no rebound                tenderness   Extremities:  Range of motion adequate, no edema, no cyanosis, no             redness   Pulses:   Pulses palpable and equal bilaterally   Skin:   No bleeding, bruising or rash   Neurologic:  No sensory or motor deficits and no dysarthria appreciated               Results Review:     CBC    Results from last 7 days   Lab Units 10/31/20  0425 10/30/20  1143   WBC 10*3/mm3 7.10 11.00*   HEMOGLOBIN g/dL 11.5* 14.1   PLATELETS 10*3/mm3 243 315     BMP   Results from last 7 days   Lab Units 10/31/20  0425 10/30/20  1143   SODIUM mmol/L 144 145   POTASSIUM mmol/L 4.1 3.0*   CHLORIDE mmol/L 108* 106   CO2 mmol/L 28.0 27.0   BUN mg/dL 5* 3*   CREATININE mg/dL 0.44* 0.50*   GLUCOSE mg/dL 102* 94   MAGNESIUM mg/dL 2.5  --    PHOSPHORUS mg/dL 3.6  --      Radiology(recent) Ct Angiogram Head    Result Date: 10/30/2020  Major arterial vasculature within head and neck appears  widely patent, with no hemodynamically significant stenosis, dissection, thrombus, or aneurysm.  Electronically Signed By-DR. Sharan Ball MD On:10/30/2020 1:03 PM This report was finalized on [id number] by DR. Sharan Ball MD.    Ct Head Without Contrast    Result Date: 10/31/2020  1.No CT findings of acute hemorrhage at this time. 2.Small left frontal infarcts seen on MRI are not clearly visible on this exam.  Electronically Signed By-DR. Joesph Luciano MD On:10/31/2020 1:11 PM This report was finalized on [id number] by DR. Joesph Luciano MD.    Ct Angiogram Neck    Result Date: 10/30/2020  Major arterial vasculature within head and neck appears widely patent, with no hemodynamically significant stenosis, dissection, thrombus, or aneurysm.  Electronically Signed By-DR. Sharan Ball MD On:10/30/2020 1:03 PM This report was finalized on [id number] by DR. Sharan Ball MD.    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 No hemorrhagic transformation or significant mass effect. 2.Findings suggestive of minimal chronic small vessel ischemic disease. 3.Moderate mucous retention cyst within left frontal sinus.  Electronically Signed By-DR. Sharan Ball MD On:10/30/2020 2:28 PM This report was finalized on [id number] by DR. Sharan Ball MD.    Xr Chest 1 View    Result Date: 10/30/2020  Patchy left lower lobe airspace disease suggestive of pneumonia.  Electronically Signed By-Anthony Durbin On:10/30/2020 12:20 PM This report was finalized on [id number] by  Anthony Durbin, .    Ct Head Without Contrast Stroke Protocol    Result Date: 10/30/2020  Paranasal sinus disease. No acute intracranial findings.  Electronically Signed By-Dr. January Patel MD On:10/30/2020 11:41 AM This report was finalized on [id number] by Dr. January Patel MD.    Ct Cerebral Perfusion With & Without Contrast    Result Date: 10/30/2020  Normal  cerebral perfusion.  Electronically Signed By-DR. Sharan Ball MD On:10/30/2020 12:03 PM This report was finalized on [id number] by DR. Sharan Ball MD.      Cardiac Studies:  Transthoracic echo pending    Medication Review:   Scheduled Meds:amLODIPine, 5 mg, Oral, Q24H  atorvastatin, 80 mg, Oral, Nightly  budesonide-formoterol, 2 puff, Inhalation, BID - RT  clopidogrel, 75 mg, Oral, Daily  metoclopramide, 10 mg, Oral, BID  pantoprazole, 40 mg, Oral, QAM  sodium chloride, 10 mL, Intravenous, Q12H  sodium chloride, 10 mL, Intravenous, Q12H  vitamin B-12, 1,000 mcg, Oral, Daily      Continuous Infusions:   PRN Meds:.•  acetaminophen **OR** acetaminophen  •  albuterol  •  aluminum-magnesium hydroxide-simethicone  •  bisacodyl  •  ipratropium-albuterol  •  magnesium hydroxide  •  magnesium sulfate **OR** magnesium sulfate in D5W 1g/100mL (PREMIX)  •  nitroglycerin  •  ondansetron **OR** ondansetron  •  potassium chloride **OR** potassium chloride **OR** potassium chloride  •  sodium chloride  •  sodium chloride  •  sodium chloride    Assessment:  Acute CVA with multiple left frontal lobe infarcts-suggestive of cardioembolic etiology  Status post alteplase therapy with minimal neurologic deficits  Essential hypertension  COPD with longstanding tobacco use      Plan:  Now started on on antiplatelet therapy with clopidogrel.  Patient reports allergic reaction to ASA, consequently will not be started.  Hemodynamically stable and satisfactory for transfer out of ICU.  I will review transthoracic echocardiographic exam personally and complete interpretation before considering NISHA.    I discussed the patients findings and my recommendations with patient and bedside nurse for coordination of care      SANTOSH Bedolla MD  10/31/20  23:43 EDT      This report was generated using the Dragon voice recognition system.

## 2020-11-02 LAB — HBA1C MFR BLD: 5.3 % (ref 3.5–5.6)

## 2020-11-02 NOTE — PAYOR COMM NOTE
"This is inpt PA request for Beth Yi--see attached PA form and clinical.    AUTHORIZATION PENDING:   PLEASE CALL OR FAX DETERMINATION TO CONTACT BELOW. THANK YOU.     OTIS LI RN  UTILIZATION REVIEW  [facility]  PH: [phone]  FX: [phone]    Stroke: Ischemic  ORG: M-83 (ISC)  Admission is indicated for 1 or more of the following(1)(2)(3)(4)(5):  Acute ischemic stroke    Beth Yi (40 y.o. Female)     Date of Birth Social Security Number Address Home Phone MRN    1980  [id number] [facility] [place] [facility] [phone] [id number]    Church Marital Status          Non-Moravian        Admission Date Admission Type Admitting Provider Attending Provider Department, Room/Bed    10/30/20 Emergency Jose Guadalupe Reyes MD  [facility] NEURO HEART, 258/1    Discharge Date Discharge Disposition Discharge Destination        11/1/2020 Home or Self Care              Attending Provider: (none)   Allergies: Aspirin, Bee Venom, Erythromycin, Penicillin G    Isolation: None   Infection: None   Code Status: Prior    Ht: 154.9 cm (61\")   Wt: 68.5 kg (151 lb)    Admission Cmt: None   Principal Problem: None                Active Insurance as of 10/30/2020     Primary Coverage     Payor Plan Insurance Group Employer/Plan Group    [place]      Payor Plan Address Payor Plan Phone Number Payor Plan Fax Number Effective Dates    [address]   3/18/2020 - None Entered    [facility] [phone]       Subscriber Name Subscriber Birth Date Member ID       BETH YI 1980 [id number]                 Emergency Contacts      (Rel.) Home Phone Work Phone Mobile Phone    JessicaArmando (Significant Other) [phone] -- --               History & Physical      Jose Guadalupe Reyes MD at 10/30/20 1323          PULMONARY/ CRITICAL CARE/ SLEEP MEDICINE ADMISSION H&P NOTE        Patient Name:  Beth Yi "    :  1980    Medical Record:  [id number]    PRIMARY CARE PHYSICIAN     Griffin Lynn MD    VIET Louie is a 40 y.o. female who presented to the emergency department today for evaluation of right-sided weakness.  She reports ports that she was in her usual state of health when she woke up this morning.  At approximately 9:30 AM this morning the patient noticed that she had developed some slurred speech as well as weakness in her right arm and hand.  She reports that she was unable to  or hold objects without dropping them.  The time EMS arrived the patient had also developed a right-sided facial droop.  Arrival to the emergency department the patient underwent a code stroke and was taken immediately to CT where she was evaluated by neurology.  Initial CT of the head was without any obvious acute intracranial findings.  CTA did not reveal obvious large vessel occlusion.  CT cerebral perfusion revealed normal cerebral perfusion.  After risk/benefit of thrombolysis was discussed with the patient per neurology tPA was administered at 11:51 AM.  Follow-up MRI of the brain revealed small acute infarctions within the lateral left frontal lobe, no hemorrhagic transformation or significant mass-effect, minimal chronic small vessel ischemic disease.    The patient denies that she has had fever, chills, nausea, vomiting, shortness of air, chest pain, dizziness, palpitations, headache, or vision changes.  She had no exacerbating or alleviating factors for her symptoms.  Respiratory panel and COVID-19 swab were negative.    The patient will be admitted to the ICU per post tPA protocol    REVIEW OF SYSTEMS    as above    MEDICAL HISTORY    Past Medical History:   Diagnosis Date   • Asthma    • COPD (chronic obstructive pulmonary disease) (CMS/[facility])    • GERD (gastroesophageal reflux disease)    • Hyperlipidemia         SURGICAL HISTORY    History reviewed. No pertinent surgical history.     FAMILY  HISTORY    History reviewed. No pertinent family history.    SOCIAL HISTORY    Social History     Tobacco Use   • Smoking status: Current Every Day Smoker   Substance Use Topics   • Alcohol use: Not on file       ALLERGIES    Allergies   Allergen Reactions   • Aspirin Hives   • Bee Venom Swelling   • Erythromycin Hives   • Penicillin G Hives       MEDICATIONS    Scheduled Meds:atorvastatin, 80 mg, Oral, Nightly  [START ON 10/31/2020] clopidogrel, 75 mg, Oral, Daily  sodium chloride, 10 mL, Intravenous, Q12H  sodium chloride, 10 mL, Intravenous, Q12H      Continuous Infusions:   PRN Meds:.•  aluminum-magnesium hydroxide-simethicone  •  bisacodyl  •  magnesium hydroxide  •  ondansetron **OR** ondansetron  •  sodium chloride  •  sodium chloride  •  sodium chloride      PHYSICAL EXAM    tMax 24 hrs:  Temp (24hrs), Av.9 °F (36.6 °C), Min:97.8 °F (36.6 °C), Max:97.9 °F (36.6 °C)    Vitals Ranges:  Temp:  [97.8 °F (36.6 °C)-97.9 °F (36.6 °C)] 97.9 °F (36.6 °C)  Heart Rate:  [59-79] 71  Resp:  [14-21] 16  BP: (138-178)/() 145/86  Intake and Output Last 3 Shifts:  No intake/output data recorded.    Constitutional:  Well developed, well nourished, no acute distress, non-toxic appearance   Eyes:  PERRL, conjunctivae normal   HENT:  Atraumatic, external ears normal, nose normal. Neck- normal range of motion, no tenderness, supple   Respiratory:  No respiratory distress, normal breath sounds, no rales, no wheezing   Cardiovascular:  Normal rate, normal rhythm, no murmurs, no gallops, no rubs   GI:  Soft, nondistended, normal bowel sounds, nontender, no rebound or guarding  : Deferred  Musculoskeletal:  No edema, no tenderness, no deformities  Integument:  Well hydrated, no rash   Neurologic:  Alert & oriented x 3.  Weakness of the right upper extremity with drift noted.  No weakness of the right lower extremity.  Very slight facial droop.  Speech slow but appropriate.  Psychiatric:  Speech and behavior appropriate      LABS    Reviewed  Microbiology Results (last 10 days)     Procedure Component Value - Date/Time    COVID PRE-OP / PRE-PROCEDURE SCREENING ORDER (NO ISOLATION) - Swab, Nasopharynx [[id number]]  (Normal) Collected: 10/30/20 1435    Lab Status: Final result Specimen: Swab from Nasopharynx Updated: 10/30/20 1641    Narrative:      The following orders were created for panel order COVID PRE-OP / PRE-PROCEDURE SCREENING ORDER (NO ISOLATION) - Swab, Nasopharynx.  Procedure                               Abnormality         Status                     ---------                               -----------         ------                     Respiratory Panel PCR w/...[[id number]]  Normal              Final result                 Please view results for these tests on the individual orders.    Respiratory Panel PCR w/COVID-19(SARS-CoV-2) DANNY/MAXIMUS/COLETTE/PAD/COR/MAD/SHARRI In-House, NP Swab in UTM/VTM, 3-4 HR TAT - Swab, Nasopharynx [[id number]]  (Normal) Collected: 10/30/20 1435    Lab Status: Final result Specimen: Swab from Nasopharynx Updated: 10/30/20 1641     ADENOVIRUS, PCR Not Detected     Coronavirus 229E Not Detected     Coronavirus HKU1 Not Detected     Coronavirus NL63 Not Detected     Coronavirus OC43 Not Detected     COVID19 Not Detected     Human Metapneumovirus Not Detected     Human Rhinovirus/Enterovirus Not Detected     Influenza A PCR Not Detected     Influenza A H1 Not Detected     Influenza A H1 2009 PCR Not Detected     Influenza A H3 Not Detected     Influenza B PCR Not Detected     Parainfluenza Virus 1 Not Detected     Parainfluenza Virus 2 Not Detected     Parainfluenza Virus 3 Not Detected     Parainfluenza Virus 4 Not Detected     RSV, PCR Not Detected     Bordetella pertussis pcr Not Detected     Bordetella parapertussis PCR Not Detected     Chlamydophila pneumoniae PCR Not Detected     Mycoplasma pneumo by PCR Not Detected    Narrative:      Fact sheet for providers:  https://docs.[employer]/wp-content/uploads/GHH7982-7978-MU7.1-EUA-Provider-Fact-Sheet-3.pdf    Fact sheet for patients: https://docs.[employer]/wp-content/uploads/DXF6476-9607-XW7.1-EUA-Patient-Fact-Sheet-1.pdf         CBC  Results from last 7 days   Lab Units 10/30/20  1143   WBC 10*3/mm3 11.00*   RBC 10*6/mm3 4.88   HEMOGLOBIN g/dL 14.1   HEMATOCRIT % 43.6   MCV fL 89.4   PLATELETS 10*3/mm3 315       BMP  Results from last 7 days   Lab Units 10/30/20  1143   SODIUM mmol/L 145   POTASSIUM mmol/L 3.0*   CHLORIDE mmol/L 106   CO2 mmol/L 27.0   BUN mg/dL 3*   CREATININE mg/dL 0.50*   GLUCOSE mg/dL 94       CMP   Results from last 7 days   Lab Units 10/30/20  1143   SODIUM mmol/L 145   POTASSIUM mmol/L 3.0*   CHLORIDE mmol/L 106   CO2 mmol/L 27.0   BUN mg/dL 3*   CREATININE mg/dL 0.50*   GLUCOSE mg/dL 94   ALBUMIN g/dL 4.20   BILIRUBIN mg/dL 0.2   ALK PHOS U/L 108   AST (SGOT) U/L 26   ALT (SGPT) U/L 28       TROPONIN  Results from last 7 days   Lab Units 10/30/20  1143   TROPONIN T ng/mL <0.010       CoAg  Results from last 7 days   Lab Units 10/30/20  1143   INR  <0.93*   APTT seconds 23.4*       Creatinine Clearance  Estimated Creatinine Clearance: 130.7 mL/min (A) (by C-G formula based on SCr of 0.5 mg/dL (L)).    ABG      IMAGING & OTHER STUDIES    Imaging Results (Last 72 Hours)     Procedure Component Value Units Date/Time    MRI Brain Without Contrast [[id number]] Collected: 10/30/20 1421     Updated: 10/30/20 1430    Narrative:      MRI BRAIN WO CONTRAST-     Date of Exam: 10/30/2020 2:00 PM     Indication: Stroke, follow up.     Comparison Exams: CT head from earlier today     Technique: MRI brain without IV contrast     FINDINGS:  There are small foci of acute infarctions within the lateral left  frontal lobe. There is no hemorrhagic transformation. The ventricles are  stable in caliber, with no midline shift. The basal cisterns appear  patent. Subtle foci of periventricular and subcortical white  matter  FLAIR hyperintensities are nonspecific, but likely the sequela of  minimal chronic small vessel ischemic disease.     The midline structures appear intact. The globes and orbits appear  intact. The intracranial vascular flow voids appear patent. There is a  moderate mucous retention cyst within the left frontal sinus.       Impression:      1.Small acute infarctions within lateral left frontal lobe. No  hemorrhagic transformation or significant mass effect.  2.Findings suggestive of minimal chronic small vessel ischemic disease.  3.Moderate mucous retention cyst within left frontal sinus.     Electronically Signed By-DR. Sharan Ball MD On:10/30/2020 2:28 PM  This report was finalized on [id number] by DR. Sharan Ball MD.    CT Angiogram Neck [[id number]] Collected: 10/30/20 1248     Updated: 10/30/20 1305    Narrative:         DATE OF EXAM:  10/30/2020 11:45 AM     PROCEDURE:  CT ANGIOGRAM NECK-, CT ANGIOGRAM HEAD-     INDICATIONS:   Stroke, follow up     COMPARISON:   CT head from earlier today     TECHNIQUE:  CTA of the head and CTA of the neck were performed after the intravenous  administration of 100 mL Isovue 370. Reconstructed coronal and sagittal  images were also obtained. In addition, a 3 D volume rendered image was  obtained after post processing. Automated exposure control and iterative  reconstruction methods were used.      FINDINGS:  VASCULAR FINDINGS: There is a three-vessel aortic arch. The right common  carotid artery is widely patent. The right carotid bifurcation is widely  patent. The right internal carotid artery is widely patent. The left  common carotid artery is widely patent. The left carotid bifurcation is  widely patent. The left internal carotid artery is widely patent. The  bilateral vertebral artery origins are widely patent. The left vertebral  artery is dominant. The bilateral vertebral arteries are widely patent.     The bilateral middle cerebral, bilateral  anterior cerebral, and anterior  communicating arteries are widely patent. Note is made of an absent A1  segment of the left anterior cerebral artery, with both anterior  cerebral arteries being fed via the right internal carotid artery,  normal variant. The basilar and bilateral posterior cerebral arteries  are widely patent. No definite posterior communicating artery is  identified on either side. No intracranial aneurysm is identified. The  visualized portions of the bilateral superior cerebellar, anterior  inferior cerebellar, and posterior inferior cerebellar arteries are  unremarkable.     NONVASCULAR FINDINGS: Within the visualized upper lungs is mild linear  atelectasis in the left upper lobe. There are a couple subcentimeter  left thyroid nodules, which are nonspecific. There is mild paranasal  sinus mucosal disease.          Impression:      Major arterial vasculature within head and neck appears widely patent,  with no hemodynamically significant stenosis, dissection, thrombus, or  aneurysm.     Electronically Signed By-DR. Sharan Ball MD On:10/30/2020 1:03 PM  This report was finalized on [id number] by DR. Sharan Ball MD.    CT Angiogram Head [[id number]] Collected: 10/30/20 1248     Updated: 10/30/20 1305    Narrative:         DATE OF EXAM:  10/30/2020 11:45 AM     PROCEDURE:  CT ANGIOGRAM NECK-, CT ANGIOGRAM HEAD-     INDICATIONS:   Stroke, follow up     COMPARISON:   CT head from earlier today     TECHNIQUE:  CTA of the head and CTA of the neck were performed after the intravenous  administration of 100 mL Isovue 370. Reconstructed coronal and sagittal  images were also obtained. In addition, a 3 D volume rendered image was  obtained after post processing. Automated exposure control and iterative  reconstruction methods were used.      FINDINGS:  VASCULAR FINDINGS: There is a three-vessel aortic arch. The right common  carotid artery is widely patent. The right carotid bifurcation is  widely  patent. The right internal carotid artery is widely patent. The left  common carotid artery is widely patent. The left carotid bifurcation is  widely patent. The left internal carotid artery is widely patent. The  bilateral vertebral artery origins are widely patent. The left vertebral  artery is dominant. The bilateral vertebral arteries are widely patent.     The bilateral middle cerebral, bilateral anterior cerebral, and anterior  communicating arteries are widely patent. Note is made of an absent A1  segment of the left anterior cerebral artery, with both anterior  cerebral arteries being fed via the right internal carotid artery,  normal variant. The basilar and bilateral posterior cerebral arteries  are widely patent. No definite posterior communicating artery is  identified on either side. No intracranial aneurysm is identified. The  visualized portions of the bilateral superior cerebellar, anterior  inferior cerebellar, and posterior inferior cerebellar arteries are  unremarkable.     NONVASCULAR FINDINGS: Within the visualized upper lungs is mild linear  atelectasis in the left upper lobe. There are a couple subcentimeter  left thyroid nodules, which are nonspecific. There is mild paranasal  sinus mucosal disease.          Impression:      Major arterial vasculature within head and neck appears widely patent,  with no hemodynamically significant stenosis, dissection, thrombus, or  aneurysm.     Electronically Signed By-DR. Sharan Ball MD On:10/30/2020 1:03 PM  This report was finalized on [id number] by DR. Sharan Ball MD.    XR Chest 1 View [[id number]] Collected: 10/30/20 1219     Updated: 10/30/20 1222    Narrative:      DATE OF EXAM:  10/30/2020 11:55 AM     PROCEDURE:  XR CHEST 1 VW-     INDICATIONS:  Acute Stroke Protocol (Onset < 12 hrs)     COMPARISON:  No Comparisons Available     TECHNIQUE:   Single radiographic AP view of the chest was obtained.     FINDINGS:  Cardiac size is  normal. The patient has developed some patchy airspace  disease in the left base. The right lung appears clear.        Impression:      Patchy left lower lobe airspace disease suggestive of pneumonia.     Electronically Signed By-Anthony Durbin On:10/30/2020 12:20 PM  This report was finalized on [id number] by  Anthony Durbin, .    CT Cerebral Perfusion With & Without Contrast [[id number]] Collected: 10/30/20 1159     Updated: 10/30/20 1205    Narrative:         DATE OF EXAM:  10/30/2020 11:43 AM     PROCEDURE:   CT CEREBRAL PERFUSION W WO CONTRAST-     INDICATIONS:   Cerebral ischemia     COMPARISON:  No Comparisons Available     TECHNIQUE:   Routine transaxial cuts were obtained through the head without  administration of  contrast. Routine transaxial cuts were then obtained  through the head following the administration of 50 mL of Isovue 370  intravenously. Core blood volume, core blood flow, mean transit time,  and Tmax images were obtained utilizing the Rapid software protocol. A  limited CT angiogram of the head was also performed to measure the blood  vessel density.      FINDINGS:  The intracranial cerebral perfusion is normal.        Impression:      Normal cerebral perfusion.     Electronically Signed By-DR. Sharan Ball MD On:10/30/2020 12:03 PM  This report was finalized on [id number] by DR. Sharan Ball MD.    CT Head Without Contrast Stroke Protocol [[id number]] Collected: 10/30/20 1140     Updated: 10/30/20 1143    Narrative:      CT HEAD WO CONTRAST STROKE PROTOCOL-     Date of Exam: 10/30/2020 11:32 AM     Indication: Stroke, follow up.  Right arm weakness. Facial droop.     Comparison: CT head without contrast 05/24/2017     Technique:  Without contrast, contiguous axial CT images of the head  were obtained from skull base to vertex.  Coronal and sagittal  reconstructions were performed.  Automated exposure control and  iterative reconstruction methods were used.     FINDINGS  No evidence of  intracranial hemorrhage, mass, or midline shift.  Sulci  and ventricles are symmetric.  Brain volume is appropriate for patient's  age.  The gray white matter differentiation is intact. Mild ethmoid  sinus mucosal thickening. Mucous retention cyst or polyp in the left  frontal sinus. Mastoid air cells are clear. Globes and extraocular  muscles are normal.  No displaced or depressed skull fractures.  No  suspicious lytic or sclerotic osseous lesions.       Impression:      Paranasal sinus disease. No acute intracranial findings.     Electronically Signed By-Dr. January Patel MD On:10/30/2020 11:41 AM  This report was finalized on [id number] by Dr. January Patel MD.          ASSESSMENT/PLAN:  Acute CVA    -received tPA at 1151 on 10/30/20  -Neurology following, stroke work-up in progress.  Etiology is concerning  -tPA protocol, monitor in ICU for at least 12 hours, then NH you  -NIH monitoring per TPA protocol  -CT head reviewed with no acute findings  -CTA head and neck reviewed: No obvious large vessel occlusion  -Normal cerebral perfusion  -MRI brain reviewed,  small acute infarctions within the lateral left frontal lobe  -Ech results pending   -A1C pending  -B12 pending  -LDL pending  -TSH pending  -History of aspirin allergy.  Start Plavix 75 mg tomorrow after repeat CT head results are negative  -Statin: Lipitor 40  -Keep systolic blood pressure less than 180/105, Cardene drip as needed, avoid hypotension  -NPO until bedside swallow test completed and cleared by speech therapy.   -Call with any acute onset of headache and obtain stat CT head  -PT/OT/ST as appropriate  -Cardiology consulted for NISHA to evaluate for etiology of possible embolic stroke    Tobacco abuse  -Current, everyday smoker    COPD, not in acute exacerbation  -continue Albuterol HFA and Symbicort     Hyperlipidemia  -Statin continued     Hypertension, new diagnosis  -Cardene if needed post tPA per protocol  -Will likely need to start p.o.  antihypertensive before discharge  -Recommend outpatient follow-up with primary care for chronic management    GERD  -continue PPI    DVT ppy s/p tPA  PUD ppy PPI      See orders. The plan was discussed with the patient    THIS DOCUMENT HAS BEEN ELECTRONICALLY SIGNED BY  AYDEN Rawls  17:39 EDT    Attending physician statement:  High risk patient   Above note scribed by nurse practitioner for me and later reviewed for accuracy. I've examined the patient and reviewed all labs and images.   I have directly participated in the evaluation and management of this patient.  Jose Guadalupe Reyes MD  Pulmonary and CCM  [facility]      Electronically signed by Jose Guadalupe Reyes MD at 10/31/20 1424       Operative/Procedure Notes (last 7 days) (Notes from 10/26/20 0846 through 11/02/20 0846)    No notes of this type exist for this encounter.            Physician Progress Notes (last 72 hours) (Notes from 10/30/20 0846 through 11/02/20 0846)      SANTOSH Bedolla MD at 11/01/20 1442          CARDIOLOGY FOLLOW-UP PROGRESS NOTE      Reason for follow-up:    Acute CVA with multiple left frontal lobe infarcts-suggestive of cardioembolic etiology  Status post alteplase therapy with minimal neurologic deficits  Essential hypertension  COPD with longstanding tobacco use     Attending: Jose Guadalupe Reyes MD      SUBJECTIVE:    Overall looks very good with no significant residual neurologic deficits.  She denies any right-sided weakness, dysarthria or other specific complaints.  Denies chest pain, shortness of air, palpitations, dizziness, lightheadedness or other constitutional complaints.     Review of Systems-no overall changes  General: denies fever, chills, anorexia, weight loss  Eyes: denies blurring, diplopia  Ear/Nose/Throat: denies ear pain, nosebleeds, hoarseness  Cardiovascular: See HPI  Respiratory: denies excessive sputum, hemoptysis, wheezing  Gastrointestinal: denies nausea, vomiting, change in bowel habits, abdominal  pain  Genitourinary: denies dysuria and hematuria  Musculoskeletal: denies back pain, joint pain, joint swelling, muscle cramps, weakness  Skin: denies rashes, itching, suspicious lesions  Neurologic: denies focal neuro deficits  Psychiatric: denies depression, anxiety  Endocrine: denies cold intolerance, heat intolerance  Hematologic/Lymphatic: denies abnormal bruising, bleeding  Allergic/Immunologic: denies urticaria or persistent infections      Allergies: Aspirin, Bee venom, Erythromycin, and Penicillin g    Scheduled Meds:amLODIPine, 5 mg, Oral, Q24H  atorvastatin, 80 mg, Oral, Nightly  budesonide-formoterol, 2 puff, Inhalation, BID - RT  clopidogrel, 75 mg, Oral, Daily  diphenhydrAMINE, 25 mg, Intravenous, Once  lisinopril, 20 mg, Oral, Q24H  metoclopramide, 10 mg, Oral, BID  pantoprazole, 40 mg, Oral, QAM  prochlorperazine, 10 mg, Intravenous, Once  sodium chloride, 10 mL, Intravenous, Q12H  sodium chloride, 10 mL, Intravenous, Q12H  vitamin B-12, 1,000 mcg, Oral, Daily        Continuous Infusions:     PRN Meds:.•  acetaminophen **OR** acetaminophen  •  albuterol  •  aluminum-magnesium hydroxide-simethicone  •  bisacodyl  •  butalbital-acetaminophen-caffeine  •  ipratropium-albuterol  •  magnesium hydroxide  •  magnesium sulfate **OR** magnesium sulfate in D5W 1g/100mL (PREMIX)  •  nitroglycerin  •  ondansetron **OR** ondansetron  •  potassium chloride **OR** potassium chloride **OR** potassium chloride  •  sodium chloride  •  sodium chloride  •  sodium chloride    Objective     Vital Signs  Vitals:    11/01/20 0345 11/01/20 0800 11/01/20 1023 11/01/20 1200   BP: 162/93      Pulse: 65  60    Resp:   16    Temp:  97.8 °F (36.6 °C)  97.8 °F (36.6 °C)   TempSrc:  Oral  Oral   SpO2: 95%  93%    Weight:       Height:         Wt Readings from Last 1 Encounters:   10/30/20 68.5 kg (151 lb)       Physical Exam:     General Appearance:    Alert, oriented, no acute distress   Neck:  Supple without JVD or bruit  HEENT:  Very poor dentition with abdominal disease   Lungs:    Grossly clear with adequate airflow bilaterally    Heart:    Regular rate and rhythm, normal S1 and S2, no            murmur, no gallop, no rub, no click   Chest Wall/Thorax:    No abnormalities observed   Abdomen:     Normal bowel sounds, no masses, no organomegaly, soft        non-tender, non-distended, no guarding, no rebound                tenderness   Extremities:  Range of motion adequate, no edema, no cyanosis, no             redness   Pulses:   Pulses palpable and equal bilaterally   Skin:   No bleeding, bruising or rash   Neurologic:  No sensory or motor deficits and no dysarthria appreciated               Results Review:     CBC    Results from last 7 days   Lab Units 11/01/20  0410 10/31/20  0425 10/30/20  1143   WBC 10*3/mm3 8.30 7.10 11.00*   HEMOGLOBIN g/dL 12.1 11.5* 14.1   PLATELETS 10*3/mm3 269 243 315     BMP   Results from last 7 days   Lab Units 11/01/20  0410 10/31/20  0425 10/30/20  1143   SODIUM mmol/L 142 144 145   POTASSIUM mmol/L 4.0 4.1 3.0*   CHLORIDE mmol/L 106 108* 106   CO2 mmol/L 27.0 28.0 27.0   BUN mg/dL 7 5* 3*   CREATININE mg/dL 0.48* 0.44* 0.50*   GLUCOSE mg/dL 98 102* 94   MAGNESIUM mg/dL 2.4 2.5  --    PHOSPHORUS mg/dL 3.4 3.6  --      Radiology(recent) Ct Head Without Contrast    Result Date: 10/31/2020  1.No CT findings of acute hemorrhage at this time. 2.Small left frontal infarcts seen on MRI are not clearly visible on this exam.  Electronically Signed By-DR. Joesph Luciano MD On:10/31/2020 1:11 PM This report was finalized on [id number] by DR. Joesph Luciano MD.      Cardiac Studies:  Transthoracic echo pending    Medication Review:   Scheduled Meds:amLODIPine, 5 mg, Oral, Q24H  atorvastatin, 80 mg, Oral, Nightly  budesonide-formoterol, 2 puff, Inhalation, BID - RT  clopidogrel, 75 mg, Oral, Daily  diphenhydrAMINE, 25 mg, Intravenous, Once  lisinopril, 20 mg, Oral, Q24H  metoclopramide, 10 mg, Oral,  BID  pantoprazole, 40 mg, Oral, QAM  prochlorperazine, 10 mg, Intravenous, Once  sodium chloride, 10 mL, Intravenous, Q12H  sodium chloride, 10 mL, Intravenous, Q12H  vitamin B-12, 1,000 mcg, Oral, Daily      Continuous Infusions:   PRN Meds:.•  acetaminophen **OR** acetaminophen  •  albuterol  •  aluminum-magnesium hydroxide-simethicone  •  bisacodyl  •  butalbital-acetaminophen-caffeine  •  ipratropium-albuterol  •  magnesium hydroxide  •  magnesium sulfate **OR** magnesium sulfate in D5W 1g/100mL (PREMIX)  •  nitroglycerin  •  ondansetron **OR** ondansetron  •  potassium chloride **OR** potassium chloride **OR** potassium chloride  •  sodium chloride  •  sodium chloride  •  sodium chloride    Assessment:  Acute CVA with multiple left frontal lobe infarcts-suggestive of cardioembolic etiology  Status post alteplase therapy with minimal neurologic deficits  Essential hypertension  COPD with longstanding tobacco use      Plan:  Now started on on antiplatelet therapy with clopidogrel.  Patient reports allergic reaction to ASA, consequently will not be started.  I am adding lisinopril 20 mg p.o. daily for antihypertensive regulation  We will place 30-day event monitor.  Otherwise, appears hemodynamically stable well compensated and symptom-free.  Satisfactory condition for discharge home from my standpoint.  I will see for outpatient follow-up in 4 to 6 weeks, or sooner if needed.    I discussed the patients findings and my recommendations with patient and bedside nurse for coordination of care      SATNOSH Bedolla MD  11/01/20  14:42 EST      This report was generated using the Dragon voice recognition system.          Electronically signed by SANTOSH Bedolla MD at 11/01/20 0487     Anca Cary APRN at 11/01/20 1201     Attestation signed by Delmer Perry MD at 11/01/20 1324    I have reviewed this documentation and agree.                         LOS: 2 days     Chief Complaint:  Right sided  weakness    Subjective   SUBJECTIVE:  History taken from: patient chart RN    Interval History: Ms. Louie is a 40-year-old female that presented to the emergency department around 9:30 am on 10/30 after onset of right facial droop and right arm weakness.  She denies any history of stroke or TIA type symptoms.  She also states that her speech is not normal.  Code stroke was initiated on arrival to [facility] and pt reported her right arm weakness felt worse. CT head completed without any obvious acute findings.  CTA reviewed and discussed with Dr. Ball, no obvious large vessel occlusion seen. Pt agreed to and received tPA on 10/30 at 11:51am.   - Portions of the above HPI were copied from previous encounters and edited as appropriate.      Patient Complaints: Right sided weakness and speech issues have resolved. Pt feels well.       Review of Systems   Constitutional: Negative for chills, diaphoresis and fatigue.   Eyes: Negative for photophobia and visual disturbance.   Neurological: Negative for dizziness, tremors, seizures, syncope, facial asymmetry, speech difficulty, weakness, light-headedness, numbness and headaches.       Pertinent PMH:  has a past medical history of Asthma, COPD (chronic obstructive pulmonary disease) (CMS/[facility]), GERD (gastroesophageal reflux disease), and Hyperlipidemia.   ________________________________________________  Objective   OBJECTIVE:  Pt examined at . She is awake and alert. Oriented x3.       Neurologic Exam    On exam:  GENERAL: NAD, awake and alert  HEENT: Normocephalic, Atraumatic. Oropharynx clear and moist.   RESP: Breathing unlabored, breath sounds normal  CARDIO: RRR  SKIN: Warm, dry. No apparent rash.   PSYCH: Mood/affect normal    NEURO:  Oriented x3  EOMI, PERRL, no visual field deficits  No facial asymmetry  Speech clear without dysarthria  Sensations intact and equal bilaterally  Strength 5/5 and equal in all extremities.  Slight bradykinesia on the  right  No ataxia    ________________________________________________   RESULTS REVIEW    VITAL SIGNS:  Temp:  [97.5 °F (36.4 °C)-98.1 °F (36.7 °C)] 97.8 °F (36.6 °C)  Heart Rate:  [60-93] 60  Resp:  [16] 16  BP: (108-162)/() 162/93    LABS:   Lab Results   Component Value Date    WBC 8.30 11/01/2020    HGB 12.1 11/01/2020    HCT 38.4 11/01/2020    MCV 90.6 11/01/2020     11/01/2020     Lab Results   Component Value Date    GLUCOSE 98 11/01/2020    BUN 7 11/01/2020    CREATININE 0.48 (L) 11/01/2020    EGFRIFNONA 142 11/01/2020    BCR 14.6 11/01/2020    K 4.0 11/01/2020    CO2 27.0 11/01/2020    CALCIUM 9.7 11/01/2020    ALBUMIN 3.70 11/01/2020    LABIL2 0.9 (L) 04/20/2017    AST 18 11/01/2020    ALT 24 11/01/2020       Lab Results   Component Value Date    TSH 1.550 10/31/2020     (H) 10/31/2020    XODBGXDN04 320 10/31/2020         IMAGING STUDIES:  Ct Head Without Contrast    Result Date: 10/31/2020  1.No CT findings of acute hemorrhage at this time. 2.Small left frontal infarcts seen on MRI are not clearly visible on this exam.  Electronically Signed By-DR. Joesph Luciano MD On:10/31/2020 1:11 PM This report was finalized on [id number] by DR. Joesph Luciano MD.    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 No hemorrhagic transformation or significant mass effect. 2.Findings suggestive of minimal chronic small vessel ischemic disease. 3.Moderate mucous retention cyst within left frontal sinus.  Electronically Signed By-DR. Sharan Ball MD On:10/30/2020 2:28 PM This report was finalized on [id number] by DR. Sharan Ball MD.      I reviewed the patient's new clinical results.    ________________________________________________      PROBLEM LIST:    Cerebrovascular accident (CVA) due to embolism of left middle cerebral artery (CMS/HCC)    Essential hypertension    B12 deficiency     Hyperlipidemia        Assessment/Plan   ASSESSMENT/PLAN:  1.  Multiple acute small ischemic strokes within the left frontal lobe.  The strokes are within the left MCA territory and are concerning for embolic source. Patient received alteplase at 1151am on 10/30/20.   - Echo: EF 61 - 65%. Negative agitated saline contrast bubble study for ASD/VSD shunting. No cardioembolic etiology identified by echo exam  - Labs: A1C: P, B12: 320, LDL: 105, TSH: 1.55  - Antithrombotics:  Patient has allergy to aspirin, start Plavix 75 mg  - Statin: Lipitor 80  - Cardiology consulted to evaluate source of embolic stroke: Plans for NISHA and possible loop recorder placement early next week.    2. HTN  - Strict blood pressure control.  - Recommend goal BP <130/80 with caution to avoid hypotension.    3. B12 Deficiency  - Replacement ordered  - Recommend cyanocobalamin 1000mcg PO daily or IM monthly    **Please refer to previous notes for further details and recommendations.     Will follow cardiac recommendations. Otherwise, pt is okay to discharge from neuro standpoint.     I discussed the patients findings and my recommendations with patient, nursing staff and consulting provider    AYDEN Rivas  11/01/20  12:02 EST        Electronically signed by Delmer Perry MD at 11/01/20 1324     DayJeniffer APRN at 11/01/20 0836          KPA/PULM/CC PROGRESS NOTE     VIET Louie is a 40 y.o. female who presented to the emergency department today for evaluation of right-sided weakness.  She reports ports that she was in her usual state of health when she woke up this morning.  At approximately 9:30 AM this morning the patient noticed that she had developed some slurred speech as well as weakness in her right arm and hand.  She reports that she was unable to  or hold objects without dropping them.  The time EMS arrived the patient had also developed a right-sided facial droop.  Arrival to the emergency department the  patient underwent a code stroke and was taken immediately to CT where she was evaluated by neurology.  Initial CT of the head was without any obvious acute intracranial findings.  CTA did not reveal obvious large vessel occlusion.  CT cerebral perfusion revealed normal cerebral perfusion.  After risk/benefit of thrombolysis was discussed with the patient per neurology tPA was administered at 11:51 AM.  Follow-up MRI of the brain revealed small acute infarctions within the lateral left frontal lobe, no hemorrhagic transformation or significant mass-effect, minimal chronic small vessel ischemic disease.     The patient denies that she has had fever, chills, nausea, vomiting, shortness of air, chest pain, dizziness, palpitations, headache, or vision changes.  She had no exacerbating or alleviating factors for her symptoms.  Respiratory panel and COVID-19 swab were negative.     The patient was admitted to the ICU per post tPA protocol       SUBJECTIVE    10/31: The patient reports she feels much better today.  Right arm weakness has nearly resolved.  No drift noted.  No shortness of air or chest pain.  No headache or vision changes.  Tolerating room air  11/1: Patient reports she feels better and slept well.  Right-sided weakness almost resolved.  Tolerating room air.  No shortness of air or chest pain.    OBJECTIVE    Vitals:    11/01/20 0006 11/01/20 0011 11/01/20 0300 11/01/20 0345   BP: 148/72   162/93   Pulse: 63   65   Resp:       Temp:  97.6 °F (36.4 °C) 98.1 °F (36.7 °C)    TempSrc:  Oral Oral    SpO2: 91%   95%   Weight:       Height:          Intake/Output last 3 shifts:  I/O last 3 completed shifts:  In: 2200 [P.O.:2200]  Out: 1000 [Urine:1000]  Intake/Output this shift:  No intake/output data recorded.    Constitutional:  Well developed, well nourished, no acute distress, non-toxic appearance   Eyes:  PERRL, conjunctivae normal   HENT:  Atraumatic, external ears normal, nose normal. Neck- normal range of  motion, no tenderness, supple   Respiratory:  No respiratory distress, normal breath sounds, no rales, no wheezing   Cardiovascular:  Normal rate, normal rhythm, no murmurs, no gallops, no rubs   GI:  Soft, nondistended, normal bowel sounds, nontender, no rebound or guarding  : Deferred  Musculoskeletal:  No edema, no tenderness, no deformities  Integument:  Well hydrated, no rash   Neurologic:  Alert & oriented x 3.    Very slight weakness of the right upper extremity, no drift noted.  No weakness of the right lower extremity.  No facial droop.    Psychiatric:  Speech and behavior appropriate        Scheduled Meds:amLODIPine, 5 mg, Oral, Q24H  atorvastatin, 80 mg, Oral, Nightly  budesonide-formoterol, 2 puff, Inhalation, BID - RT  clopidogrel, 75 mg, Oral, Daily  metoclopramide, 10 mg, Oral, BID  pantoprazole, 40 mg, Oral, QAM  sodium chloride, 10 mL, Intravenous, Q12H  sodium chloride, 10 mL, Intravenous, Q12H  vitamin B-12, 1,000 mcg, Oral, Daily        Continuous Infusions:     PRN Meds:•  acetaminophen **OR** acetaminophen  •  albuterol  •  aluminum-magnesium hydroxide-simethicone  •  bisacodyl  •  ipratropium-albuterol  •  magnesium hydroxide  •  magnesium sulfate **OR** magnesium sulfate in D5W 1g/100mL (PREMIX)  •  nitroglycerin  •  ondansetron **OR** ondansetron  •  potassium chloride **OR** potassium chloride **OR** potassium chloride  •  sodium chloride  •  sodium chloride  •  sodium chloride     LABS    CBC  Results from last 7 days   Lab Units 11/01/20  0410 10/31/20  0425 10/30/20  1143   WBC 10*3/mm3 8.30 7.10 11.00*   RBC 10*6/mm3 4.23 3.96 4.88   HEMOGLOBIN g/dL 12.1 11.5* 14.1   HEMATOCRIT % 38.4 35.5 43.6   MCV fL 90.6 89.8 89.4   PLATELETS 10*3/mm3 269 243 315       CMP   Results from last 7 days   Lab Units 11/01/20  0410 10/31/20  0425 10/30/20  1143   SODIUM mmol/L 142 144 145   POTASSIUM mmol/L 4.0 4.1 3.0*   CHLORIDE mmol/L 106 108* 106   CO2 mmol/L 27.0 28.0 27.0   BUN mg/dL 7 5* 3*    CREATININE mg/dL 0.48* 0.44* 0.50*   GLUCOSE mg/dL 98 102* 94   ALBUMIN g/dL 3.70 3.40* 4.20   BILIRUBIN mg/dL 0.2 <0.2 0.2   ALK PHOS U/L 97 92 108   AST (SGOT) U/L 18 16 26   ALT (SGPT) U/L 24 21 28       TROPONIN  Results from last 7 days   Lab Units 10/30/20  1143   TROPONIN T ng/mL <0.010       CoAg  Results from last 7 days   Lab Units 10/30/20  1143   INR  <0.93*   APTT seconds 23.4*       ABG        Microbiology  Microbiology Results (last 10 days)     Procedure Component Value - Date/Time    COVID PRE-OP / PRE-PROCEDURE SCREENING ORDER (NO ISOLATION) - Swab, Nasopharynx [[id number]]  (Normal) Collected: 10/30/20 1435    Lab Status: Final result Specimen: Swab from Nasopharynx Updated: 10/30/20 1641    Narrative:      The following orders were created for panel order COVID PRE-OP / PRE-PROCEDURE SCREENING ORDER (NO ISOLATION) - Swab, Nasopharynx.  Procedure                               Abnormality         Status                     ---------                               -----------         ------                     Respiratory Panel PCR w/...[[id number]]  Normal              Final result                 Please view results for these tests on the individual orders.    Respiratory Panel PCR w/COVID-19(SARS-CoV-2) DANNY/[facility]/COLETTE/[facility]ARRI In-House, NP Swab in UTM/VTM, 3-4 HR TAT - Swab, Nasopharynx [[id number]]  (Normal) Collected: 10/30/20 1435    Lab Status: Final result Specimen: Swab from Nasopharynx Updated: 10/30/20 1641     ADENOVIRUS, PCR Not Detected     Coronavirus 229E Not Detected     Coronavirus HKU1 Not Detected     Coronavirus NL63 Not Detected     Coronavirus OC43 Not Detected     COVID19 Not Detected     Human Metapneumovirus Not Detected     Human Rhinovirus/Enterovirus Not Detected     Influenza A PCR Not Detected     Influenza A H1 Not Detected     Influenza A H1 2009 PCR Not Detected     Influenza A H3 Not Detected     Influenza B PCR Not Detected     Parainfluenza Virus 1 Not Detected      Parainfluenza Virus 2 Not Detected     Parainfluenza Virus 3 Not Detected     Parainfluenza Virus 4 Not Detected     RSV, PCR Not Detected     Bordetella pertussis pcr Not Detected     Bordetella parapertussis PCR Not Detected     Chlamydophila pneumoniae PCR Not Detected     Mycoplasma pneumo by PCR Not Detected    Narrative:      Fact sheet for providers: https://docs.[employer]/wp-content/uploads/QNH3644-5293-RR1.1-EUA-Provider-Fact-Sheet-3.pdf    Fact sheet for patients: https://docs.[employer]/wp-content/uploads/UUW9605-7509-MX0.1-EUA-Patient-Fact-Sheet-1.pdf          IMAGING & OTHER STUDIES    Imaging Results (Last 72 Hours)     Procedure Component Value Units Date/Time    CT Head Without Contrast [[id number]] Collected: 10/31/20 1306     Updated: 10/31/20 1314    Narrative:         DATE OF EXAM:  10/31/2020 12:35 PM     PROCEDURE:   CT HEAD WO CONTRAST-     INDICATIONS:   Stroke, follow up; I63.9-Cerebral infarction, unspecified     COMPARISON:  CT head 10/30/2020, MRI 10/30/2020.     TECHNIQUE:   Routine transaxial cuts were obtained through the head without the  administration of contrast. Automated exposure control and iterative  reconstruction methods were used.      FINDINGS:  No midline shift. Basal cisterns appear patent.  Ventricles and sulci  appear within normal limits for patient age.     No findings of acute hemorrhage or extra-axial collection. No definite  mass or focal edema is identified.  No definite CT evidence of an acute  infarction is seen. The small infarcts in the left frontal lobe seen on  MRI are not clearly visible on this exam.     Probable left frontal mucus retention cyst, as before.  Mastoid air  cells appear clear.  No acute calvarial abnormality is identified.       Impression:      1.No CT findings of acute hemorrhage at this time.  2.Small left frontal infarcts seen on MRI are not clearly visible on  this exam.     Electronically Signed By-DR. Joesph Luciano MD  On:10/31/2020 1:11 PM  This report was finalized on [id number] by DR. Joesph Luciano MD.    MRI Brain Without Contrast [[id number]] Collected: 10/30/20 1421     Updated: 10/30/20 1430    Narrative:      MRI BRAIN WO CONTRAST-     Date of Exam: 10/30/2020 2:00 PM     Indication: Stroke, follow up.     Comparison Exams: CT head from earlier today     Technique: MRI brain without IV contrast     FINDINGS:  There are small foci of acute infarctions within the lateral left  frontal lobe. There is no hemorrhagic transformation. The ventricles are  stable in caliber, with no midline shift. The basal cisterns appear  patent. Subtle foci of periventricular and subcortical white matter  FLAIR hyperintensities are nonspecific, but likely the sequela of  minimal chronic small vessel ischemic disease.     The midline structures appear intact. The globes and orbits appear  intact. The intracranial vascular flow voids appear patent. There is a  moderate mucous retention cyst within the left frontal sinus.       Impression:      1.Small acute infarctions within lateral left frontal lobe. No  hemorrhagic transformation or significant mass effect.  2.Findings suggestive of minimal chronic small vessel ischemic disease.  3.Moderate mucous retention cyst within left frontal sinus.     Electronically Signed By-DR. Sharan Ball MD On:10/30/2020 2:28 PM  This report was finalized on [id number] by DR. Sharan Ball MD.    CT Angiogram Neck [[id number]] Collected: 10/30/20 1248     Updated: 10/30/20 1305    Narrative:         DATE OF EXAM:  10/30/2020 11:45 AM     PROCEDURE:  CT ANGIOGRAM NECK-, CT ANGIOGRAM HEAD-     INDICATIONS:   Stroke, follow up     COMPARISON:   CT head from earlier today     TECHNIQUE:  CTA of the head and CTA of the neck were performed after the intravenous  administration of 100 mL Isovue 370. Reconstructed coronal and sagittal  images were also obtained. In addition, a 3 D volume rendered image  was  obtained after post processing. Automated exposure control and iterative  reconstruction methods were used.      FINDINGS:  VASCULAR FINDINGS: There is a three-vessel aortic arch. The right common  carotid artery is widely patent. The right carotid bifurcation is widely  patent. The right internal carotid artery is widely patent. The left  common carotid artery is widely patent. The left carotid bifurcation is  widely patent. The left internal carotid artery is widely patent. The  bilateral vertebral artery origins are widely patent. The left vertebral  artery is dominant. The bilateral vertebral arteries are widely patent.     The bilateral middle cerebral, bilateral anterior cerebral, and anterior  communicating arteries are widely patent. Note is made of an absent A1  segment of the left anterior cerebral artery, with both anterior  cerebral arteries being fed via the right internal carotid artery,  normal variant. The basilar and bilateral posterior cerebral arteries  are widely patent. No definite posterior communicating artery is  identified on either side. No intracranial aneurysm is identified. The  visualized portions of the bilateral superior cerebellar, anterior  inferior cerebellar, and posterior inferior cerebellar arteries are  unremarkable.     NONVASCULAR FINDINGS: Within the visualized upper lungs is mild linear  atelectasis in the left upper lobe. There are a couple subcentimeter  left thyroid nodules, which are nonspecific. There is mild paranasal  sinus mucosal disease.          Impression:      Major arterial vasculature within head and neck appears widely patent,  with no hemodynamically significant stenosis, dissection, thrombus, or  aneurysm.     Electronically Signed By-DR. Sharan Ball MD On:10/30/2020 1:03 PM  This report was finalized on [id number] by DR. Sharan Ball MD.    CT Angiogram Head [[id number]] Collected: 10/30/20 1248     Updated: 10/30/20 1305    Narrative:          DATE OF EXAM:  10/30/2020 11:45 AM     PROCEDURE:  CT ANGIOGRAM NECK-, CT ANGIOGRAM HEAD-     INDICATIONS:   Stroke, follow up     COMPARISON:   CT head from earlier today     TECHNIQUE:  CTA of the head and CTA of the neck were performed after the intravenous  administration of 100 mL Isovue 370. Reconstructed coronal and sagittal  images were also obtained. In addition, a 3 D volume rendered image was  obtained after post processing. Automated exposure control and iterative  reconstruction methods were used.      FINDINGS:  VASCULAR FINDINGS: There is a three-vessel aortic arch. The right common  carotid artery is widely patent. The right carotid bifurcation is widely  patent. The right internal carotid artery is widely patent. The left  common carotid artery is widely patent. The left carotid bifurcation is  widely patent. The left internal carotid artery is widely patent. The  bilateral vertebral artery origins are widely patent. The left vertebral  artery is dominant. The bilateral vertebral arteries are widely patent.     The bilateral middle cerebral, bilateral anterior cerebral, and anterior  communicating arteries are widely patent. Note is made of an absent A1  segment of the left anterior cerebral artery, with both anterior  cerebral arteries being fed via the right internal carotid artery,  normal variant. The basilar and bilateral posterior cerebral arteries  are widely patent. No definite posterior communicating artery is  identified on either side. No intracranial aneurysm is identified. The  visualized portions of the bilateral superior cerebellar, anterior  inferior cerebellar, and posterior inferior cerebellar arteries are  unremarkable.     NONVASCULAR FINDINGS: Within the visualized upper lungs is mild linear  atelectasis in the left upper lobe. There are a couple subcentimeter  left thyroid nodules, which are nonspecific. There is mild paranasal  sinus mucosal disease.          Impression:       Major arterial vasculature within head and neck appears widely patent,  with no hemodynamically significant stenosis, dissection, thrombus, or  aneurysm.     Electronically Signed By-DR. Sharan Ball MD On:10/30/2020 1:03 PM  This report was finalized on [id number] by DR. Sharan Ball MD.    XR Chest 1 View [[id number]] Collected: 10/30/20 1219     Updated: 10/30/20 1222    Narrative:      DATE OF EXAM:  10/30/2020 11:55 AM     PROCEDURE:  XR CHEST 1 VW-     INDICATIONS:  Acute Stroke Protocol (Onset < 12 hrs)     COMPARISON:  No Comparisons Available     TECHNIQUE:   Single radiographic AP view of the chest was obtained.     FINDINGS:  Cardiac size is normal. The patient has developed some patchy airspace  disease in the left base. The right lung appears clear.        Impression:      Patchy left lower lobe airspace disease suggestive of pneumonia.     Electronically Signed By-Anthony Durbin On:10/30/2020 12:20 PM  This report was finalized on [id number] by  Anthony Durbin, .    CT Cerebral Perfusion With & Without Contrast [[id number]] Collected: 10/30/20 1159     Updated: 10/30/20 1205    Narrative:         DATE OF EXAM:  10/30/2020 11:43 AM     PROCEDURE:   CT CEREBRAL PERFUSION W WO CONTRAST-     INDICATIONS:   Cerebral ischemia     COMPARISON:  No Comparisons Available     TECHNIQUE:   Routine transaxial cuts were obtained through the head without  administration of  contrast. Routine transaxial cuts were then obtained  through the head following the administration of 50 mL of Isovue 370  intravenously. Core blood volume, core blood flow, mean transit time,  and Tmax images were obtained utilizing the Rapid software protocol. A  limited CT angiogram of the head was also performed to measure the blood  vessel density.      FINDINGS:  The intracranial cerebral perfusion is normal.        Impression:      Normal cerebral perfusion.     Electronically Signed By-DR. Sharan Ball MD On:10/30/2020  12:03 PM  This report was finalized on [id number] by DR. Sharan Ball MD.    CT Head Without Contrast Stroke Protocol [[id number]] Collected: 10/30/20 1140     Updated: 10/30/20 1143    Narrative:      CT HEAD WO CONTRAST STROKE PROTOCOL-     Date of Exam: 10/30/2020 11:32 AM     Indication: Stroke, follow up.  Right arm weakness. Facial droop.     Comparison: CT head without contrast 05/24/2017     Technique:  Without contrast, contiguous axial CT images of the head  were obtained from skull base to vertex.  Coronal and sagittal  reconstructions were performed.  Automated exposure control and  iterative reconstruction methods were used.     FINDINGS  No evidence of intracranial hemorrhage, mass, or midline shift.  Sulci  and ventricles are symmetric.  Brain volume is appropriate for patient's  age.  The gray white matter differentiation is intact. Mild ethmoid  sinus mucosal thickening. Mucous retention cyst or polyp in the left  frontal sinus. Mastoid air cells are clear. Globes and extraocular  muscles are normal.  No displaced or depressed skull fractures.  No  suspicious lytic or sclerotic osseous lesions.       Impression:      Paranasal sinus disease. No acute intracranial findings.     Electronically Signed By-Dr. January Patel MD On:10/30/2020 11:41 AM  This report was finalized on [id number] by Dr. January Patel MD.                ASSESSMENT/PLAN:     Acute CVA    -received tPA at 1151 on 10/30/20  -Neurology following, stroke work-up in progress.  Etiology is concerning  -tPA protocol, monitor in ICU for at least 12 hours, then JENNIFER  -NIH monitoring per TPA protocol  -CT head reviewed with no acute findings  -CTA head and neck reviewed: No obvious large vessel occlusion  -Normal cerebral perfusion  -MRI brain reviewed,  small acute infarctions within the lateral left frontal lobe   -Repeat head CT 10/31/2020 - for acute findings  -Echo results  reviewed, no cardioembolic etiology identified,  negative agitated saline contrast bubble study  -A1C pending  -B12  320  -Lipid panel reviewed  -TSH  1.55  -History of aspirin allergy.    -Started Plavix 75 mg  -Statin: Lipitor 40  -Cardiology consulted for NISHA to evaluate for etiology of possible embolic stroke     Tobacco abuse  -Current, everyday smoker     COPD, not in acute exacerbation  -continue Albuterol HFA and Symbicort     Hyperlipidemia  -Statin continued  -Lipid panel reviewed    Hypertension, new diagnosis  -Add Norvasc  -Recommend outpatient follow-up with primary care for chronic management     GERD  -continue PPI     DVT ppy s/p tPA  PUD ppy PPI    JENNIFER overflow. Hospitalist following for medical management      See orders. The plan was discussed with the patient      Attending physician statement:  Above note scribed by nurse practitioner for me and later reviewed for accuracy. I've examined the patient and reviewed all labs and images.   I have directly participated in the evaluation and management of this patient.  Jose Guadalupe Reyes MD  Pulmonary and CCM  [facility]        Electronically signed by Jeniffer Almendarez APRN at 11/01/20 1306     SANTOSH Bedolla MD at 10/31/20 3923          CARDIOLOGY FOLLOW-UP PROGRESS NOTE      Reason for follow-up:    Acute CVA with multiple left frontal lobe infarcts-suggestive of cardioembolic etiology  Status post alteplase therapy with minimal neurologic deficits  Essential hypertension  COPD with longstanding tobacco use     Attending: Jose Guadalupe Reyes MD      SUBJECTIVE:    Overall looks very good with no significant residual neurologic deficits.  She denies any right-sided weakness, dysarthria or other specific complaints     Review of Systems   General: denies fever, chills, anorexia, weight loss  Eyes: denies blurring, diplopia  Ear/Nose/Throat: denies ear pain, nosebleeds, hoarseness  Cardiovascular: See HPI  Respiratory: denies excessive sputum, hemoptysis, wheezing  Gastrointestinal: denies nausea, vomiting,  change in bowel habits, abdominal pain  Genitourinary: denies dysuria and hematuria  Musculoskeletal: denies back pain, joint pain, joint swelling, muscle cramps, weakness  Skin: denies rashes, itching, suspicious lesions  Neurologic: denies focal neuro deficits  Psychiatric: denies depression, anxiety  Endocrine: denies cold intolerance, heat intolerance  Hematologic/Lymphatic: denies abnormal bruising, bleeding  Allergic/Immunologic: denies urticaria or persistent infections      Allergies: Aspirin, Bee venom, Erythromycin, and Penicillin g    Scheduled Meds:amLODIPine, 5 mg, Oral, Q24H  atorvastatin, 80 mg, Oral, Nightly  budesonide-formoterol, 2 puff, Inhalation, BID - RT  clopidogrel, 75 mg, Oral, Daily  metoclopramide, 10 mg, Oral, BID  pantoprazole, 40 mg, Oral, QAM  sodium chloride, 10 mL, Intravenous, Q12H  sodium chloride, 10 mL, Intravenous, Q12H  vitamin B-12, 1,000 mcg, Oral, Daily        Continuous Infusions:     PRN Meds:.•  acetaminophen **OR** acetaminophen  •  albuterol  •  aluminum-magnesium hydroxide-simethicone  •  bisacodyl  •  ipratropium-albuterol  •  magnesium hydroxide  •  magnesium sulfate **OR** magnesium sulfate in D5W 1g/100mL (PREMIX)  •  nitroglycerin  •  ondansetron **OR** ondansetron  •  potassium chloride **OR** potassium chloride **OR** potassium chloride  •  sodium chloride  •  sodium chloride  •  sodium chloride    Objective     Vital Signs  Vitals:    10/31/20 1959 10/31/20 2000 10/31/20 2032 10/31/20 2034   BP: (!) 141/115 131/77     Pulse:  65 69 65   Resp:   16 16   Temp:  97.8 °F (36.6 °C)     TempSrc:  Oral     SpO2:  93% 96% 93%   Weight:       Height:         Wt Readings from Last 1 Encounters:   10/30/20 68.5 kg (151 lb)       Physical Exam:     General Appearance:    Alert, oriented, no acute distress   Neck:  Supple without JVD or bruit  HEENT: Very poor dentition with abdominal disease   Lungs:    Grossly clear with adequate airflow bilaterally    Heart:    Regular  rate and rhythm, normal S1 and S2, no            murmur, no gallop, no rub, no click   Chest Wall/Thorax:    No abnormalities observed   Abdomen:     Normal bowel sounds, no masses, no organomegaly, soft        non-tender, non-distended, no guarding, no rebound                tenderness   Extremities:  Range of motion adequate, no edema, no cyanosis, no             redness   Pulses:   Pulses palpable and equal bilaterally   Skin:   No bleeding, bruising or rash   Neurologic:  No sensory or motor deficits and no dysarthria appreciated               Results Review:     CBC    Results from last 7 days   Lab Units 10/31/20  0425 10/30/20  1143   WBC 10*3/mm3 7.10 11.00*   HEMOGLOBIN g/dL 11.5* 14.1   PLATELETS 10*3/mm3 243 315     BMP   Results from last 7 days   Lab Units 10/31/20  0425 10/30/20  1143   SODIUM mmol/L 144 145   POTASSIUM mmol/L 4.1 3.0*   CHLORIDE mmol/L 108* 106   CO2 mmol/L 28.0 27.0   BUN mg/dL 5* 3*   CREATININE mg/dL 0.44* 0.50*   GLUCOSE mg/dL 102* 94   MAGNESIUM mg/dL 2.5  --    PHOSPHORUS mg/dL 3.6  --      Radiology(recent) Ct Angiogram Head    Result Date: 10/30/2020  Major arterial vasculature within head and neck appears widely patent, with no hemodynamically significant stenosis, dissection, thrombus, or aneurysm.  Electronically Signed By-DR. Sharan Ball MD On:10/30/2020 1:03 PM This report was finalized on [id number] by DR. Sharan Ball MD.    Ct Head Without Contrast    Result Date: 10/31/2020  1.No CT findings of acute hemorrhage at this time. 2.Small left frontal infarcts seen on MRI are not clearly visible on this exam.  Electronically Signed By-DR. Joesph Luciano MD On:10/31/2020 1:11 PM This report was finalized on [id number] by DR. Joesph Luciano MD.    Ct Angiogram Neck    Result Date: 10/30/2020  Major arterial vasculature within head and neck appears widely patent, with no hemodynamically significant stenosis, dissection, thrombus, or aneurysm.  Electronically  Signed By-DR. Sharan Ball MD On:10/30/2020 1:03 PM This report was finalized on [id number] by DR. Sharan Ball MD.    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 No hemorrhagic transformation or significant mass effect. 2.Findings suggestive of minimal chronic small vessel ischemic disease. 3.Moderate mucous retention cyst within left frontal sinus.  Electronically Signed By-DR. Sharan Ball MD On:10/30/2020 2:28 PM This report was finalized on [id number] by DR. Sharan Ball MD.    Xr Chest 1 View    Result Date: 10/30/2020  Patchy left lower lobe airspace disease suggestive of pneumonia.  Electronically Signed By-Anthony Durbin On:10/30/2020 12:20 PM This report was finalized on [id number] by  Anthony Durbin, .    Ct Head Without Contrast Stroke Protocol    Result Date: 10/30/2020  Paranasal sinus disease. No acute intracranial findings.  Electronically Signed By-Dr. January Patel MD On:10/30/2020 11:41 AM This report was finalized on [id number] by Dr. January Patel MD.    Ct Cerebral Perfusion With & Without Contrast    Result Date: 10/30/2020  Normal cerebral perfusion.  Electronically Signed By-DR. Sharan Ball MD On:10/30/2020 12:03 PM This report was finalized on [id number] by DR. Sharan Ball MD.      Cardiac Studies:  Transthoracic echo pending    Medication Review:   Scheduled Meds:amLODIPine, 5 mg, Oral, Q24H  atorvastatin, 80 mg, Oral, Nightly  budesonide-formoterol, 2 puff, Inhalation, BID - RT  clopidogrel, 75 mg, Oral, Daily  metoclopramide, 10 mg, Oral, BID  pantoprazole, 40 mg, Oral, QAM  sodium chloride, 10 mL, Intravenous, Q12H  sodium chloride, 10 mL, Intravenous, Q12H  vitamin B-12, 1,000 mcg, Oral, Daily      Continuous Infusions:   PRN Meds:.•  acetaminophen **OR** acetaminophen  •  albuterol  •  aluminum-magnesium hydroxide-simethicone  •  bisacodyl  •  ipratropium-albuterol  •   magnesium hydroxide  •  magnesium sulfate **OR** magnesium sulfate in D5W 1g/100mL (PREMIX)  •  nitroglycerin  •  ondansetron **OR** ondansetron  •  potassium chloride **OR** potassium chloride **OR** potassium chloride  •  sodium chloride  •  sodium chloride  •  sodium chloride    Assessment:  Acute CVA with multiple left frontal lobe infarcts-suggestive of cardioembolic etiology  Status post alteplase therapy with minimal neurologic deficits  Essential hypertension  COPD with longstanding tobacco use      Plan:  Now started on on antiplatelet therapy with clopidogrel.  Patient reports allergic reaction to ASA, consequently will not be started.  Hemodynamically stable and satisfactory for transfer out of ICU.  I will review transthoracic echocardiographic exam personally and complete interpretation before considering NISHA.    I discussed the patients findings and my recommendations with patient and bedside nurse for coordination of care      SANTOSH Bedolla MD  10/31/20  23:43 EDT      This report was generated using the Dragon voice recognition system.          Electronically signed by SANTOSH Bedolla MD at 10/31/20 1600     Jose Guadalupe Reyes MD at 10/31/20 1418          [facility]/PULM/CC PROGRESS NOTE     VIET Louie is a 40 y.o. female who presented to the emergency department today for evaluation of right-sided weakness.  She reports ports that she was in her usual state of health when she woke up this morning.  At approximately 9:30 AM this morning the patient noticed that she had developed some slurred speech as well as weakness in her right arm and hand.  She reports that she was unable to  or hold objects without dropping them.  The time EMS arrived the patient had also developed a right-sided facial droop.  Arrival to the emergency department the patient underwent a code stroke and was taken immediately to CT where she was evaluated by neurology.  Initial CT of the head was without any  obvious acute intracranial findings.  CTA did not reveal obvious large vessel occlusion.  CT cerebral perfusion revealed normal cerebral perfusion.  After risk/benefit of thrombolysis was discussed with the patient per neurology tPA was administered at 11:51 AM.  Follow-up MRI of the brain revealed small acute infarctions within the lateral left frontal lobe, no hemorrhagic transformation or significant mass-effect, minimal chronic small vessel ischemic disease.     The patient denies that she has had fever, chills, nausea, vomiting, shortness of air, chest pain, dizziness, palpitations, headache, or vision changes.  She had no exacerbating or alleviating factors for her symptoms.  Respiratory panel and COVID-19 swab were negative.     The patient was admitted to the ICU per post tPA protocol       SUBJECTIVE    10/31: The patient reports she feels much better today.  Right arm weakness has nearly resolved.  No drift noted.  No shortness of air or chest pain.  No headache or vision changes.  Tolerating room air    OBJECTIVE    Vitals:    10/31/20 0700 10/31/20 0800 10/31/20 0900 10/31/20 1022   BP:       Pulse: 61 61 72 62   Resp:    16   Temp:  97.5 °F (36.4 °C)     TempSrc:  Oral     SpO2: 98% 97% 97% 93%   Weight:       Height:          Intake/Output last 3 shifts:  I/O last 3 completed shifts:  In: 1679 [P.O.:1440; I.V.:239]  Out: -   Intake/Output this shift:  No intake/output data recorded.    Constitutional:  Well developed, well nourished, no acute distress, non-toxic appearance   Eyes:  PERRL, conjunctivae normal   HENT:  Atraumatic, external ears normal, nose normal. Neck- normal range of motion, no tenderness, supple   Respiratory:  No respiratory distress, normal breath sounds, no rales, no wheezing   Cardiovascular:  Normal rate, normal rhythm, no murmurs, no gallops, no rubs   GI:  Soft, nondistended, normal bowel sounds, nontender, no rebound or guarding  : Deferred  Musculoskeletal:  No edema, no  tenderness, no deformities  Integument:  Well hydrated, no rash   Neurologic:  Alert & oriented x 3.    Very slight weakness of the right upper extremity, no drift noted.  No weakness of the right lower extremity.  No facial droop.    Psychiatric:  Speech and behavior appropriate        Scheduled Meds:amLODIPine, 5 mg, Oral, Q24H  atorvastatin, 80 mg, Oral, Nightly  budesonide-formoterol, 2 puff, Inhalation, BID - RT  clopidogrel, 75 mg, Oral, Daily  metoclopramide, 10 mg, Oral, BID  pantoprazole, 40 mg, Oral, QAM  sodium chloride, 10 mL, Intravenous, Q12H  sodium chloride, 10 mL, Intravenous, Q12H  vitamin B-12, 1,000 mcg, Oral, Daily        Continuous Infusions:     PRN Meds:•  acetaminophen **OR** acetaminophen  •  albuterol  •  aluminum-magnesium hydroxide-simethicone  •  bisacodyl  •  ipratropium-albuterol  •  magnesium hydroxide  •  magnesium sulfate **OR** magnesium sulfate in D5W 1g/100mL (PREMIX)  •  nitroglycerin  •  ondansetron **OR** ondansetron  •  potassium chloride **OR** potassium chloride **OR** potassium chloride  •  sodium chloride  •  sodium chloride  •  sodium chloride     LABS    CBC  Results from last 7 days   Lab Units 10/31/20  0425 10/30/20  1143   WBC 10*3/mm3 7.10 11.00*   RBC 10*6/mm3 3.96 4.88   HEMOGLOBIN g/dL 11.5* 14.1   HEMATOCRIT % 35.5 43.6   MCV fL 89.8 89.4   PLATELETS 10*3/mm3 243 315       CMP   Results from last 7 days   Lab Units 10/31/20  0425 10/30/20  1143   SODIUM mmol/L 144 145   POTASSIUM mmol/L 4.1 3.0*   CHLORIDE mmol/L 108* 106   CO2 mmol/L 28.0 27.0   BUN mg/dL 5* 3*   CREATININE mg/dL 0.44* 0.50*   GLUCOSE mg/dL 102* 94   ALBUMIN g/dL 3.40* 4.20   BILIRUBIN mg/dL <0.2 0.2   ALK PHOS U/L 92 108   AST (SGOT) U/L 16 26   ALT (SGPT) U/L 21 28       TROPONIN  Results from last 7 days   Lab Units 10/30/20  1143   TROPONIN T ng/mL <0.010       CoAg  Results from last 7 days   Lab Units 10/30/20  1143   INR  <0.93*   APTT seconds 23.4*       ABG         Microbiology  Microbiology Results (last 10 days)     Procedure Component Value - Date/Time    COVID PRE-OP / PRE-PROCEDURE SCREENING ORDER (NO ISOLATION) - Swab, Nasopharynx [[id number]]  (Normal) Collected: 10/30/20 1435    Lab Status: Final result Specimen: Swab from Nasopharynx Updated: 10/30/20 1641    Narrative:      The following orders were created for panel order COVID PRE-OP / PRE-PROCEDURE SCREENING ORDER (NO ISOLATION) - Swab, Nasopharynx.  Procedure                               Abnormality         Status                     ---------                               -----------         ------                     Respiratory Panel PCR w/...[[id number]]  Normal              Final result                 Please view results for these tests on the individual orders.    Respiratory Panel PCR w/COVID-19(SARS-CoV-2) [facility] In-House, NP Swab in UTM/VTM, 3-4 HR TAT - Swab, Nasopharynx [[id number]]  (Normal) Collected: 10/30/20 1435    Lab Status: Final result Specimen: Swab from Nasopharynx Updated: 10/30/20 1641     ADENOVIRUS, PCR Not Detected     Coronavirus 229E Not Detected     Coronavirus HKU1 Not Detected     Coronavirus NL63 Not Detected     Coronavirus OC43 Not Detected     COVID19 Not Detected     Human Metapneumovirus Not Detected     Human Rhinovirus/Enterovirus Not Detected     Influenza A PCR Not Detected     Influenza A H1 Not Detected     Influenza A H1 2009 PCR Not Detected     Influenza A H3 Not Detected     Influenza B PCR Not Detected     Parainfluenza Virus 1 Not Detected     Parainfluenza Virus 2 Not Detected     Parainfluenza Virus 3 Not Detected     Parainfluenza Virus 4 Not Detected     RSV, PCR Not Detected     Bordetella pertussis pcr Not Detected     Bordetella parapertussis PCR Not Detected     Chlamydophila pneumoniae PCR Not Detected     Mycoplasma pneumo by PCR Not Detected    Narrative:      Fact sheet for providers:  https://docs.You.[employer]/wp-content/uploads/SIK5522-6125-VN6.1-EUA-Provider-Fact-Sheet-3.pdf    Fact sheet for patients: https://docs.[employer]/wp-content/uploads/UIE8337-1784-HK8.1-EUA-Patient-Fact-Sheet-1.pdf          IMAGING & OTHER STUDIES    Imaging Results (Last 72 Hours)     Procedure Component Value Units Date/Time    CT Head Without Contrast [[id number]] Collected: 10/31/20 1306     Updated: 10/31/20 1314    Narrative:         DATE OF EXAM:  10/31/2020 12:35 PM     PROCEDURE:   CT HEAD WO CONTRAST-     INDICATIONS:   Stroke, follow up; I63.9-Cerebral infarction, unspecified     COMPARISON:  CT head 10/30/2020, MRI 10/30/2020.     TECHNIQUE:   Routine transaxial cuts were obtained through the head without the  administration of contrast. Automated exposure control and iterative  reconstruction methods were used.      FINDINGS:  No midline shift. Basal cisterns appear patent.  Ventricles and sulci  appear within normal limits for patient age.     No findings of acute hemorrhage or extra-axial collection. No definite  mass or focal edema is identified.  No definite CT evidence of an acute  infarction is seen. The small infarcts in the left frontal lobe seen on  MRI are not clearly visible on this exam.     Probable left frontal mucus retention cyst, as before.  Mastoid air  cells appear clear.  No acute calvarial abnormality is identified.       Impression:      1.No CT findings of acute hemorrhage at this time.  2.Small left frontal infarcts seen on MRI are not clearly visible on  this exam.     Electronically Signed By-DR. Joesph Luciano MD On:10/31/2020 1:11 PM  This report was finalized on [id number] by DR. Joesph Luciano MD.    MRI Brain Without Contrast [[id number]] Collected: 10/30/20 1421     Updated: 10/30/20 1430    Narrative:      MRI BRAIN WO CONTRAST-     Date of Exam: 10/30/2020 2:00 PM     Indication: Stroke, follow up.     Comparison Exams: CT head from earlier today     Technique: MRI  brain without IV contrast     FINDINGS:  There are small foci of acute infarctions within the lateral left  frontal lobe. There is no hemorrhagic transformation. The ventricles are  stable in caliber, with no midline shift. The basal cisterns appear  patent. Subtle foci of periventricular and subcortical white matter  FLAIR hyperintensities are nonspecific, but likely the sequela of  minimal chronic small vessel ischemic disease.     The midline structures appear intact. The globes and orbits appear  intact. The intracranial vascular flow voids appear patent. There is a  moderate mucous retention cyst within the left frontal sinus.       Impression:      1.Small acute infarctions within lateral left frontal lobe. No  hemorrhagic transformation or significant mass effect.  2.Findings suggestive of minimal chronic small vessel ischemic disease.  3.Moderate mucous retention cyst within left frontal sinus.     Electronically Signed By-DR. Sharan Ball MD On:10/30/2020 2:28 PM  This report was finalized on [id number] by DR. Sharan Ball MD.    CT Angiogram Neck [[id number]] Collected: 10/30/20 1248     Updated: 10/30/20 1305    Narrative:         DATE OF EXAM:  10/30/2020 11:45 AM     PROCEDURE:  CT ANGIOGRAM NECK-, CT ANGIOGRAM HEAD-     INDICATIONS:   Stroke, follow up     COMPARISON:   CT head from earlier today     TECHNIQUE:  CTA of the head and CTA of the neck were performed after the intravenous  administration of 100 mL Isovue 370. Reconstructed coronal and sagittal  images were also obtained. In addition, a 3 D volume rendered image was  obtained after post processing. Automated exposure control and iterative  reconstruction methods were used.      FINDINGS:  VASCULAR FINDINGS: There is a three-vessel aortic arch. The right common  carotid artery is widely patent. The right carotid bifurcation is widely  patent. The right internal carotid artery is widely patent. The left  common carotid artery is  widely patent. The left carotid bifurcation is  widely patent. The left internal carotid artery is widely patent. The  bilateral vertebral artery origins are widely patent. The left vertebral  artery is dominant. The bilateral vertebral arteries are widely patent.     The bilateral middle cerebral, bilateral anterior cerebral, and anterior  communicating arteries are widely patent. Note is made of an absent A1  segment of the left anterior cerebral artery, with both anterior  cerebral arteries being fed via the right internal carotid artery,  normal variant. The basilar and bilateral posterior cerebral arteries  are widely patent. No definite posterior communicating artery is  identified on either side. No intracranial aneurysm is identified. The  visualized portions of the bilateral superior cerebellar, anterior  inferior cerebellar, and posterior inferior cerebellar arteries are  unremarkable.     NONVASCULAR FINDINGS: Within the visualized upper lungs is mild linear  atelectasis in the left upper lobe. There are a couple subcentimeter  left thyroid nodules, which are nonspecific. There is mild paranasal  sinus mucosal disease.          Impression:      Major arterial vasculature within head and neck appears widely patent,  with no hemodynamically significant stenosis, dissection, thrombus, or  aneurysm.     Electronically Signed By-DR. Sharan Ball MD On:10/30/2020 1:03 PM  This report was finalized on [id number] by DR. Sharan Ball MD.    CT Angiogram Head [[id number]] Collected: 10/30/20 1248     Updated: 10/30/20 1305    Narrative:         DATE OF EXAM:  10/30/2020 11:45 AM     PROCEDURE:  CT ANGIOGRAM NECK-, CT ANGIOGRAM HEAD-     INDICATIONS:   Stroke, follow up     COMPARISON:   CT head from earlier today     TECHNIQUE:  CTA of the head and CTA of the neck were performed after the intravenous  administration of 100 mL Isovue 370. Reconstructed coronal and sagittal  images were also obtained. In  addition, a 3 D volume rendered image was  obtained after post processing. Automated exposure control and iterative  reconstruction methods were used.      FINDINGS:  VASCULAR FINDINGS: There is a three-vessel aortic arch. The right common  carotid artery is widely patent. The right carotid bifurcation is widely  patent. The right internal carotid artery is widely patent. The left  common carotid artery is widely patent. The left carotid bifurcation is  widely patent. The left internal carotid artery is widely patent. The  bilateral vertebral artery origins are widely patent. The left vertebral  artery is dominant. The bilateral vertebral arteries are widely patent.     The bilateral middle cerebral, bilateral anterior cerebral, and anterior  communicating arteries are widely patent. Note is made of an absent A1  segment of the left anterior cerebral artery, with both anterior  cerebral arteries being fed via the right internal carotid artery,  normal variant. The basilar and bilateral posterior cerebral arteries  are widely patent. No definite posterior communicating artery is  identified on either side. No intracranial aneurysm is identified. The  visualized portions of the bilateral superior cerebellar, anterior  inferior cerebellar, and posterior inferior cerebellar arteries are  unremarkable.     NONVASCULAR FINDINGS: Within the visualized upper lungs is mild linear  atelectasis in the left upper lobe. There are a couple subcentimeter  left thyroid nodules, which are nonspecific. There is mild paranasal  sinus mucosal disease.          Impression:      Major arterial vasculature within head and neck appears widely patent,  with no hemodynamically significant stenosis, dissection, thrombus, or  aneurysm.     Electronically Signed By-DR. Sharan Ball MD On:10/30/2020 1:03 PM  This report was finalized on [id number] by DR. Sharan Ball MD.    XR Chest 1 View [[id number]] Collected: 10/30/20 1219      Updated: 10/30/20 1222    Narrative:      DATE OF EXAM:  10/30/2020 11:55 AM     PROCEDURE:  XR CHEST 1 VW-     INDICATIONS:  Acute Stroke Protocol (Onset < 12 hrs)     COMPARISON:  No Comparisons Available     TECHNIQUE:   Single radiographic AP view of the chest was obtained.     FINDINGS:  Cardiac size is normal. The patient has developed some patchy airspace  disease in the left base. The right lung appears clear.        Impression:      Patchy left lower lobe airspace disease suggestive of pneumonia.     Electronically Signed By-Anthony Durbin On:10/30/2020 12:20 PM  This report was finalized on [id number] by  Anthony Durbin, .    CT Cerebral Perfusion With & Without Contrast [[id number]] Collected: 10/30/20 1159     Updated: 10/30/20 1205    Narrative:         DATE OF EXAM:  10/30/2020 11:43 AM     PROCEDURE:   CT CEREBRAL PERFUSION W WO CONTRAST-     INDICATIONS:   Cerebral ischemia     COMPARISON:  No Comparisons Available     TECHNIQUE:   Routine transaxial cuts were obtained through the head without  administration of  contrast. Routine transaxial cuts were then obtained  through the head following the administration of 50 mL of Isovue 370  intravenously. Core blood volume, core blood flow, mean transit time,  and Tmax images were obtained utilizing the Rapid software protocol. A  limited CT angiogram of the head was also performed to measure the blood  vessel density.      FINDINGS:  The intracranial cerebral perfusion is normal.        Impression:      Normal cerebral perfusion.     Electronically Signed By-DR. Sharan Ball MD On:10/30/2020 12:03 PM  This report was finalized on [id number] by DR. Sharan Ball MD.    CT Head Without Contrast Stroke Protocol [[id number]] Collected: 10/30/20 1140     Updated: 10/30/20 1143    Narrative:      CT HEAD WO CONTRAST STROKE PROTOCOL-     Date of Exam: 10/30/2020 11:32 AM     Indication: Stroke, follow up.  Right arm weakness. Facial droop.     Comparison:  CT head without contrast 05/24/2017     Technique:  Without contrast, contiguous axial CT images of the head  were obtained from skull base to vertex.  Coronal and sagittal  reconstructions were performed.  Automated exposure control and  iterative reconstruction methods were used.     FINDINGS  No evidence of intracranial hemorrhage, mass, or midline shift.  Sulci  and ventricles are symmetric.  Brain volume is appropriate for patient's  age.  The gray white matter differentiation is intact. Mild ethmoid  sinus mucosal thickening. Mucous retention cyst or polyp in the left  frontal sinus. Mastoid air cells are clear. Globes and extraocular  muscles are normal.  No displaced or depressed skull fractures.  No  suspicious lytic or sclerotic osseous lesions.       Impression:      Paranasal sinus disease. No acute intracranial findings.     Electronically Signed By-Dr. January Patel MD On:10/30/2020 11:41 AM  This report was finalized on [id number] by Dr. January Patel MD.                ASSESSMENT/PLAN:     Acute CVA    -received tPA at 1151 on 10/30/20  -Neurology following, stroke work-up in progress.  Etiology is concerning  -tPA protocol, monitor in ICU for at least 12 hours, then [facility]  -NIH monitoring per TPA protocol  -CT head reviewed with no acute findings  -CTA head and neck reviewed: No obvious large vessel occlusion  -Normal cerebral perfusion  -MRI brain reviewed,  small acute infarctions within the lateral left frontal lobe   -Repeat head CT 10/31/2020 - for acute findings  -Echo results pending   -A1C pending  -B12  320  -Lipid panel reviewed  -TSH  1.55  -History of aspirin allergy.    -Started Plavix 75 mg  -Statin: Lipitor 40  -Cardiology consulted for NISHA to evaluate for etiology of possible embolic stroke     Tobacco abuse  -Current, everyday smoker     COPD, not in acute exacerbation  -continue Albuterol HFA and Symbicort     Hyperlipidemia  -Statin continued  -Lipid panel  reviewed    Hypertension, new diagnosis  -Add Norvasc  -Recommend outpatient follow-up with primary care for chronic management     GERD  -continue PPI     DVT ppy s/p tPA  PUD ppy PPI        See orders. The plan was discussed with the patient    Patient is stable for transfer out of the intensive care unit.  Will consult to the hospitalist for medical management    Attending physician statement:  Above note scribed by nurse practitioner for me and later reviewed for accuracy. I've examined the patient and reviewed all labs and images.   I have directly participated in the evaluation and management of this patient.  Jose Guadalupe Reyes MD  Pulmonary and CCM  [facility]        Electronically signed by Jose Guadalupe Reyes MD at 10/31/20 1427     Delmer Perry MD at 10/31/20 1304               LOS: 1 day     Chief Complaint:  Right sided weakness    Subjective   SUBJECTIVE:  History taken from: patient chart RN    Interval History: Ms. Louie is a 40-year-old female that presented to the emergency department around 9:30 am on 10/30 after onset of right facial droop and right arm weakness.  She denies any history of stroke or TIA type symptoms.  She also states that her speech is not normal.  Code stroke was initiated on arrival to [facility] and pt reported her right arm weakness felt worse. CT head completed without any obvious acute findings.  CTA reviewed and discussed with Dr. Ball, no obvious large vessel occlusion seen. Pt agreed to and received tPA on 10/30 at 11:51am.   - Portions of the above HPI were copied from previous encounters and edited as appropriate.      Patient Complaints: Right sided weakness and speech have improved.       Review of Systems   Constitutional: Negative for chills, diaphoresis and fatigue.   Eyes: Negative for photophobia and visual disturbance.   Neurological: Positive for facial asymmetry and weakness. Negative for dizziness, tremors, seizures, syncope, speech  difficulty, light-headedness, numbness and headaches.       Pertinent PMH:  has a past medical history of Asthma, COPD (chronic obstructive pulmonary disease) (CMS/[facility]), GERD (gastroesophageal reflux disease), and Hyperlipidemia.   ________________________________________________  Objective   OBJECTIVE:  Pt examined at .       Neurologic Exam    Patient is awake and alert and oriented.  The right hand is much better.  She may be at 5 - right now dexterity is improved speech has improved    Exam performed by Dr. Perry    ________________________________________________   RESULTS REVIEW    VITAL SIGNS:  Temp:  [97.4 °F (36.3 °C)-98.4 °F (36.9 °C)] 97.5 °F (36.4 °C)  Heart Rate:  [] 62  Resp:  [15-20] 16  BP: (121-178)/() 145/89    LABS:   Lab Results   Component Value Date    WBC 7.10 10/31/2020    HGB 11.5 (L) 10/31/2020    HCT 35.5 10/31/2020    MCV 89.8 10/31/2020     10/31/2020     Lab Results   Component Value Date    GLUCOSE 102 (H) 10/31/2020    BUN 5 (L) 10/31/2020    CREATININE 0.44 (L) 10/31/2020    EGFRIFNONA >150 10/31/2020    BCR 11.4 10/31/2020    K 4.1 10/31/2020    CO2 28.0 10/31/2020    CALCIUM 9.6 10/31/2020    ALBUMIN 3.40 (L) 10/31/2020    LABIL2 0.9 (L) 04/20/2017    AST 16 10/31/2020    ALT 21 10/31/2020       Lab Results   Component Value Date    TSH 1.550 10/31/2020     (H) 10/31/2020    [id number] 320 10/31/2020         IMAGING STUDIES:  Ct Angiogram Head    Result Date: 10/30/2020  Major arterial vasculature within head and neck appears widely patent, with no hemodynamically significant stenosis, dissection, thrombus, or aneurysm.  Electronically Signed By-DR. Sharan Ball MD On:10/30/2020 1:03 PM This report was finalized on [id number] by DR. Sharan Ball MD.    Ct Head Without Contrast    Result Date: 10/31/2020  1.No CT findings of acute hemorrhage at this time. 2.Small left frontal infarcts seen on MRI are not clearly visible on this exam.   Electronically Signed By-DR. Joesph Luciano MD On:10/31/2020 1:11 PM This report was finalized on [id number] by DR. Joesph Luciano MD.    Ct Angiogram Neck    Result Date: 10/30/2020  Major arterial vasculature within head and neck appears widely patent, with no hemodynamically significant stenosis, dissection, thrombus, or aneurysm.  Electronically Signed By-DR. Sharan Ball MD On:10/30/2020 1:03 PM This report was finalized on [id number] by DR. Sharan Ball MD.    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 No hemorrhagic transformation or significant mass effect. 2.Findings suggestive of minimal chronic small vessel ischemic disease. 3.Moderate mucous retention cyst within left frontal sinus.  Electronically Signed By-DR. Sharan Ball MD On:10/30/2020 2:28 PM This report was finalized on [id number] by DR. Sharan Ball MD.    Xr Chest 1 View    Result Date: 10/30/2020  Patchy left lower lobe airspace disease suggestive of pneumonia.  Electronically Signed By-Anthony Durbin On:10/30/2020 12:20 PM This report was finalized on [id number] by  Anthony Durbin, .    Ct Head Without Contrast Stroke Protocol    Result Date: 10/30/2020  Paranasal sinus disease. No acute intracranial findings.  Electronically Signed By-Dr. January Patel MD On:10/30/2020 11:41 AM This report was finalized on [id number] by Dr. January Patel MD.    Ct Cerebral Perfusion With & Without Contrast    Result Date: 10/30/2020  Normal cerebral perfusion.  Electronically Signed By-DR. Sharan Ball MD On:10/30/2020 12:03 PM This report was finalized on [id number] by DR. Sharan Ball MD.      I reviewed the patient's new clinical results.    ________________________________________________      PROBLEM LIST:    Cerebrovascular accident (CVA) due to embolism of left middle cerebral artery (CMS/HCC)    Essential hypertension    B12  deficiency        Assessment/Plan   ASSESSMENT/PLAN:  1.  Multiple acute small strokes within the left frontal lobe.  The strokes are within the left MCA territory and are concerning for embolic source. Patient received alteplase at 1151am on 10/30/20.  No obvious large vessel occlusion seen.    - CT head 24 hours post tPA: No CT findings of acute hemorrhage at this time. Small left frontal infarcts seen on MRI are not clearly visible on this exam.  - CTA head and neck: Major arterial vasculature within head and neck appears widely patent,  with no hemodynamically significant stenosis, dissection, thrombus, or aneurysm.  - MRI brain: Small acute infarctions within lateral left frontal lobe. No hemorrhagic transformation or significant mass effect. Findings suggestive of minimal chronic small vessel ischemic disease. Moderate mucous retention cyst within left frontal sinus.  - Echo: Report pending  - Labs: A1C: P, B12: 320, LDL: 105, TSH: 1.55  - Antithrombotics: Patient is post TPA at 11:51 AM on 10-.  Patient has allergy to aspirin, start Plavix 75 mg tomorrow after repeat CT head is negative.  - Statin: Lipitor 80  - Please call with any acute onset of headache, stat CT head at that time.  - Cardiology consulted to evaluate source of embolic stroke: will consider NISHA pending TTE   - Recommend Loop recorder at discharge  - Okay to transfer to [facility]    2. HTN  - Strict blood pressure control. Pt is now outside the window for permissive HTN. Recommend goal BP <130/80 with caution to avoid hypotension.    3. B12 Deficiency  - Replacement ordered  - Recommend cyanocobalamin 1000mcg PO daily or IM monthly    Cardiology on case and they are planning NISHA and possible loop recorder early next week  **Please refer to previous notes for further details and recommendations.     Will follow cardiac recommendations.     I discussed the patients findings and my recommendations with patient, nursing staff and consulting  provider    AYDEN Rivas  10/31/20  13:15 EDT        Electronically signed by Delmer Perry MD at 10/31/20 1450          Consult Notes (last 72 hours) (Notes from 10/30/20 0846 through 11/02/20 0846)      Federico Gracia MD at 10/31/20 1859      Consult Orders    1. Inpatient Hospitalist Consult [[id number]] ordered by Jeniffer Almendarez APRN at 10/31/20 0843                 Referring Provider: Griffin Lynn MD    Reason for Consultation:      Management of hypertension, stroke sequelae    2309/1  Patient Care Team:  Griffin Lynn MD as PCP - General  Griffin Lynn MD as PCP - Family Medicine    Chief complaint     Chief Complaint   Patient presents with   • Stroke         History of present illness:   40-year-old female presents with right-sided weakness. Patient states she felt fine this morning when she woke up. Around 9:30 AM this morning she started noticing some slurred speech and weakness in her right arm and hand. She states she could not hold anything in her right hand without dropping it. When EMS arrived they found the patient to have a right-sided facial droop as well. She denies any chest pain or shortness of breath. She has had no headache. No alleviating or exacerbating factors.     Patient had TPA and was transferred to ICU.  She has been seen by a pulmonology, cardiology, neurology.  She states she has no residual deficit now        Past Medical History:   Diagnosis Date   • Asthma    • COPD (chronic obstructive pulmonary disease) (CMS/[facility])    • GERD (gastroesophageal reflux disease)    • Hyperlipidemia      History reviewed. No pertinent surgical history.  History reviewed. No pertinent family history.  Social History     Tobacco Use   • Smoking status: Current Every Day Smoker   Substance Use Topics   • Alcohol use: Not on file   • Drug use: Not on file     Medications Prior to Admission   Medication Sig Dispense Refill Last Dose   • albuterol sulfate  (90 Base)  "MCG/ACT inhaler Inhale 2 puffs Every 4 (Four) Hours As Needed for Wheezing.      • budesonide-formoterol (SYMBICORT) 160-4.5 MCG/ACT inhaler Inhale 2 puffs 2 (Two) Times a Day.   10/30/2020 at Unknown time   • metoclopramide (REGLAN) 10 MG tablet Take 10 mg by mouth 2 (two) times a day.   10/30/2020 at Unknown time   • omeprazole (priLOSEC) 40 MG capsule Take 40 mg by mouth Daily.   10/30/2020 at Unknown time     Allergies:  Aspirin, Bee venom, Erythromycin, and Penicillin g      There is no immunization history on file for this patient.        REVIEW OF SYSTEMS:     ROS  Constitutional: Negative for fever.   HENT: Negative for congestion and sore throat.    Eyes: Negative for redness.   Respiratory: Negative for cough and shortness of breath.    Cardiovascular: Negative for chest pain.   Gastrointestinal: Negative for abdominal pain, diarrhea and vomiting.   Genitourinary: Negative for dysuria.   Musculoskeletal: Negative for back pain.   Skin: Negative for rash.   Neurological: Resolved speech difficulty and weakness. Negative for dizziness and headaches.   Psychiatric/Behavioral: Negative for confusion.         Vital Signs  Temp:  [97.4 °F (36.3 °C)-98.4 °F (36.9 °C)] 97.5 °F (36.4 °C)  Heart Rate:  [] 62  Resp:  [16-20] 16  BP: (121-174)/(72-99) 145/89    Flowsheet Rows      First Filed Value   Admission Height  155.6 cm (61.25\") Documented at 10/30/2020 1139   Admission Weight  74.3 kg (163 lb 12.8 oz) Documented at 10/30/2020 1139       Weight change:     Physical Exam:    Physical Exam  Vitals signs and nursing note reviewed.   Constitutional:       General: She is not in acute distress.     Appearance: Normal appearance. She is well-developed. She is not ill-appearing, toxic-appearing or diaphoretic.   HENT:      Head: Normocephalic and atraumatic.      Right Ear: Ear canal and external ear normal.      Left Ear: Ear canal and external ear normal.      Nose: Nose normal. No congestion or rhinorrhea.    "   Mouth/Throat:      Mouth: Mucous membranes are moist.      Pharynx: No oropharyngeal exudate.      Comments: Multiple broken teeth.  Highly suspicious for methamphetamine use in a 40-year-old.  Eyes:      General: No scleral icterus.        Right eye: No discharge.         Left eye: No discharge.      Extraocular Movements: Extraocular movements intact.      Conjunctiva/sclera: Conjunctivae normal.      Pupils: Pupils are equal, round, and reactive to light.   Neck:      Musculoskeletal: Normal range of motion and neck supple. No neck rigidity or muscular tenderness.      Thyroid: No thyromegaly.      Vascular: No carotid bruit or JVD.      Trachea: No tracheal deviation.   Cardiovascular:      Rate and Rhythm: Normal rate and regular rhythm.      Pulses: Normal pulses.      Heart sounds: Normal heart sounds. No murmur. No friction rub. No gallop.    Pulmonary:      Effort: Pulmonary effort is normal. No respiratory distress.      Breath sounds: Normal breath sounds. No stridor. No wheezing, rhonchi or rales.   Chest:      Chest wall: No tenderness.   Abdominal:      General: Bowel sounds are normal. There is no distension.      Palpations: Abdomen is soft. There is no mass.      Tenderness: There is no abdominal tenderness. There is no guarding or rebound.      Hernia: No hernia is present.   Musculoskeletal: Normal range of motion.         General: No swelling, tenderness, deformity or signs of injury.      Right lower leg: No edema.      Left lower leg: No edema.   Lymphadenopathy:      Cervical: No cervical adenopathy.   Skin:     General: Skin is warm and dry.      Coloration: Skin is not jaundiced or pale.      Findings: No bruising, erythema or rash.   Neurological:      General: No focal deficit present.      Mental Status: She is alert and oriented to person, place, and time. Mental status is at baseline.      Cranial Nerves: No cranial nerve deficit.      Sensory: No sensory deficit.      Motor: No  weakness or abnormal muscle tone.      Coordination: Coordination normal.   Psychiatric:         Mood and Affect: Mood normal.         Behavior: Behavior normal.         Thought Content: Thought content normal.         Judgment: Judgment normal.           Results Review:    I reviewed the patient's new clinical results.      Results from last 7 days   Lab Units 10/31/20  0425 10/30/20  1143   WBC 10*3/mm3 7.10 11.00*   HEMOGLOBIN g/dL 11.5* 14.1   HEMATOCRIT % 35.5 43.6   MCV fL 89.8 89.4   MCH pg 29.1 28.9   PLATELETS 10*3/mm3 243 315     Results from last 7 days   Lab Units 10/31/20  0425 10/30/20  1143   SODIUM mmol/L 144 145   POTASSIUM mmol/L 4.1 3.0*   CHLORIDE mmol/L 108* 106   CO2 mmol/L 28.0 27.0   BUN mg/dL 5* 3*   CREATININE mg/dL 0.44* 0.50*   CALCIUM mg/dL 9.6 10.1   MAGNESIUM mg/dL 2.5  --    BILIRUBIN mg/dL <0.2 0.2   ALK PHOS U/L 92 108   ALT (SGPT) U/L 21 28   AST (SGOT) U/L 16 26   GLUCOSE mg/dL 102* 94     Lab Results   Component Value Date    CALCIUM 9.6 10/31/2020    PHOS 3.6 10/31/2020     No results found for: HGBA1C      Results from last 7 days   Lab Units 10/30/20  1143   INR  <0.93*           Inflammatory Biomarkers        Invalid input(s): ESR, D-DIMER QUANTITATIVE,  PROCALCITONIN  COVID19   Date Value Ref Range Status   10/30/2020 Not Detected Not Detected - Ref. Range Final        Microbiology Results (last 10 days)     Procedure Component Value - Date/Time    COVID PRE-OP / PRE-PROCEDURE SCREENING ORDER (NO ISOLATION) - Swab, Nasopharynx [[id number]]  (Normal) Collected: 10/30/20 1435    Lab Status: Final result Specimen: Swab from Nasopharynx Updated: 10/30/20 1641    Narrative:      The following orders were created for panel order COVID PRE-OP / PRE-PROCEDURE SCREENING ORDER (NO ISOLATION) - Swab, Nasopharynx.  Procedure                               Abnormality         Status                     ---------                               -----------         ------                      Respiratory Panel PCR w/...[[id number]]  Normal              Final result                 Please view results for these tests on the individual orders.    Respiratory Panel PCR w/COVID-19(SARS-CoV-2) [facility] In-House, NP Swab in UTM/VTM, 3-4 HR TAT - Swab, Nasopharynx [[id number]]  (Normal) Collected: 10/30/20 1435    Lab Status: Final result Specimen: Swab from Nasopharynx Updated: 10/30/20 1641     ADENOVIRUS, PCR Not Detected     Coronavirus 229E Not Detected     Coronavirus HKU1 Not Detected     Coronavirus NL63 Not Detected     Coronavirus OC43 Not Detected     COVID19 Not Detected     Human Metapneumovirus Not Detected     Human Rhinovirus/Enterovirus Not Detected     Influenza A PCR Not Detected     Influenza A H1 Not Detected     Influenza A H1 2009 PCR Not Detected     Influenza A H3 Not Detected     Influenza B PCR Not Detected     Parainfluenza Virus 1 Not Detected     Parainfluenza Virus 2 Not Detected     Parainfluenza Virus 3 Not Detected     Parainfluenza Virus 4 Not Detected     RSV, PCR Not Detected     Bordetella pertussis pcr Not Detected     Bordetella parapertussis PCR Not Detected     Chlamydophila pneumoniae PCR Not Detected     Mycoplasma pneumo by PCR Not Detected    Narrative:      Fact sheet for providers: https://docs.[employer]/wp-content/uploads/HAR4224-9974-LZ9.1-EUA-Provider-Fact-Sheet-3.pdf    Fact sheet for patients: https://docs.[employer]/wp-content/uploads/PJF1435-7847-YM9.1-EUA-Patient-Fact-Sheet-1.pdf          ECG/EMG Results (most recent)     Procedure Component Value Units Date/Time    ECG 12 Lead [[id number]] Collected: 10/30/20 1158     Updated: 10/30/20 1201     QT Interval 445 ms     Narrative:      HEART RATE= 77  bpm  RR Interval= 776  ms  MN Interval= 286  ms  P Horizontal Axis= 8  deg  P Front Axis= 91  deg  QRSD Interval= 158  ms  QT Interval= 445  ms  QRS Axis= -49  deg  T Wave Axis= 128  deg  - ABNORMAL ECG -  Sinus rhythm  Prolonged MN  interval  When compared with ECG of 23-Jan-2018 13:44:25,  New conduction abnormality  Electronically Signed By:   Date and Time of Study: 2020-10-30 11:58:51    Adult Transthoracic Echo Complete W/ Cont if Necessary Per Protocol [[id number]] Collected: 10/30/20 1745     Updated: 10/30/20 1854     BSA 1.7 m^2      RVIDd 1.8 cm      IVSd 1.4 cm      LVIDd 3.7 cm      LVIDs 2.5 cm      LVPWd 1.0 cm      IVS/LVPW 1.3     FS 32.9 %      EDV(Teich) 57.2 ml      ESV(Teich) 21.6 ml      EF(Teich) 62.2 %      EDV(cubed) 49.7 ml      ESV(cubed) 15.0 ml      EF(cubed) 69.7 %      LV mass(C)d 148.0 grams      LV mass(C)dI 88.3 grams/m^2      SV(Teich) 35.6 ml      SI(Teich) 21.2 ml/m^2      SV(cubed) 34.6 ml      SI(cubed) 20.7 ml/m^2      Ao root diam 2.5 cm      Ao root area 4.9 cm^2      ACS 1.7 cm      asc Aorta Diam 2.4 cm      LVOT diam 1.9 cm      LVOT area 2.8 cm^2      EDV(MOD-sp4) 58.5 ml      ESV(MOD-sp4) 20.8 ml      EF(MOD-sp4) 64.5 %      SV(MOD-sp4) 37.7 ml      SI(MOD-sp4) 22.5 ml/m^2      Ao root area (BSA corrected) 1.5     LV Dukes Vol (BSA corrected) 34.9 ml/m^2      LV Sys Vol (BSA corrected) 12.4 ml/m^2      Aortic R-R 0.92 sec      Aortic HR 65.1 BPM      MV E max nicolás 72.8 cm/sec      MV A max nicolás 54.0 cm/sec      MV E/A 1.3     MV V2 max 103.5 cm/sec      MV max PG 4.3 mmHg      MV V2 mean 62.3 cm/sec      MV mean PG 1.8 mmHg      MV V2 VTI 23.0 cm      MVA(VTI) 2.2 cm^2      MV dec slope 430.6 cm/sec^2      MV dec time 0.17 sec      Ao pk nicolás 136.5 cm/sec      Ao max PG 7.5 mmHg      Ao max PG (full) 4.1 mmHg      Ao V2 mean 88.6 cm/sec      Ao mean PG 3.8 mmHg      Ao mean PG (full) 2.3 mmHg      Ao V2 VTI 26.0 cm      CHASITY(I,A) 1.9 cm^2      CHASITY(I,D) 1.9 cm^2      CHASITY(V,A) 1.9 cm^2      CHASITY(V,D) 1.9 cm^2      LV V1 max PG 3.4 mmHg      LV V1 mean PG 1.5 mmHg      LV V1 max 91.8 cm/sec      LV V1 mean 55.7 cm/sec      LV V1 VTI 17.6 cm      CO(Ao) 8.3 l/min      CI(Ao) 4.9 l/min/m^2      SV(Ao) 127.0 ml       SI(Ao) 75.8 ml/m^2      CO(LVOT) 3.2 l/min      CI(LVOT) 1.9 l/min/m^2      SV(LVOT) 49.5 ml      SI(LVOT) 29.5 ml/m^2      PA V2 max 111.3 cm/sec      PA max PG 5.0 mmHg      PA max PG (full) 1.6 mmHg      PA V2 mean 72.7 cm/sec      PA mean PG 2.6 mmHg      PA mean PG (full) 1.1 mmHg      PA V2 VTI 24.1 cm      PA acc time 0.12 sec      RV V1 max PG 3.4 mmHg      RV V1 mean PG 1.5 mmHg      RV V1 max 92.0 cm/sec      RV V1 mean 56.2 cm/sec      RV V1 VTI 20.3 cm      TR max ladarius 191.5 cm/sec      RVSP(TR) 17.7 mmHg      RAP systole 3.0 mmHg      PA pr(Accel) 25.9 mmHg      Pulm Sys Ladarius 52.1 cm/sec      Pulm Dukes Ladarius 38.2 cm/sec      Pulm S/D 1.4     Pulm A Revs Dur 0.12 sec      Pulm A Revs Ladarius 31.4 cm/sec       CV ECHO SHAKIRA - BZI_BMI 28.5 kilograms/m^2       CV ECHO SHAKIRA - BSA(HAYCOCK) 1.7 m^2       CV ECHO SHAKIRA - BZI_METRIC_WEIGHT 68.5 kg       CV ECHO SHAKIRA - BZI_METRIC_HEIGHT 154.9 cm      EF(MOD-bp) 65.0 %      LA dimension(2D) 3.2 cm           Ct Angiogram Head    Result Date: 10/30/2020  Major arterial vasculature within head and neck appears widely patent, with no hemodynamically significant stenosis, dissection, thrombus, or aneurysm.  Electronically Signed By-DR. Sharan Ball MD On:10/30/2020 1:03 PM This report was finalized on [id number] by DR. Sharan Ball MD.    Ct Head Without Contrast    Result Date: 10/31/2020  1.No CT findings of acute hemorrhage at this time. 2.Small left frontal infarcts seen on MRI are not clearly visible on this exam.  Electronically Signed By-DR. Joesph Luciano MD On:10/31/2020 1:11 PM This report was finalized on [id number] by DR. Joesph Luciano MD.    Ct Angiogram Neck    Result Date: 10/30/2020  Major arterial vasculature within head and neck appears widely patent, with no hemodynamically significant stenosis, dissection, thrombus, or aneurysm.  Electronically Signed By-DR. Sharan Ball MD On:10/30/2020 1:03 PM This report was finalized on  [id number] by DR. Sharan Ball MD.    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 No hemorrhagic transformation or significant mass effect. 2.Findings suggestive of minimal chronic small vessel ischemic disease. 3.Moderate mucous retention cyst within left frontal sinus.  Electronically Signed By-DR. Sharan Ball MD On:10/30/2020 2:28 PM This report was finalized on [id number] by DR. Sharan Ball MD.    Xr Chest 1 View    Result Date: 10/30/2020  Patchy left lower lobe airspace disease suggestive of pneumonia.  Electronically Signed By-Anthony Durbin On:10/30/2020 12:20 PM This report was finalized on [id number] by  Anthony Durbin, .    Ct Head Without Contrast Stroke Protocol    Result Date: 10/30/2020  Paranasal sinus disease. No acute intracranial findings.  Electronically Signed By-Dr. January Patel MD On:10/30/2020 11:41 AM This report was finalized on [id number] by Dr. January Patel MD.    Ct Cerebral Perfusion With & Without Contrast    Result Date: 10/30/2020  Normal cerebral perfusion.  Electronically Signed By-DR. Sharan Ball MD On:10/30/2020 12:03 PM This report was finalized on [id number] by DR. Sharan Ball MD.      I personally reviewed patient's x-ray films and my findings are: 0    I personally reviewed patient's EKG strip and my findings are: 0    Scheduled Meds  amLODIPine, 5 mg, Oral, Q24H  atorvastatin, 80 mg, Oral, Nightly  budesonide-formoterol, 2 puff, Inhalation, BID - RT  clopidogrel, 75 mg, Oral, Daily  metoclopramide, 10 mg, Oral, BID  pantoprazole, 40 mg, Oral, QAM  sodium chloride, 10 mL, Intravenous, Q12H  sodium chloride, 10 mL, Intravenous, Q12H  vitamin B-12, 1,000 mcg, Oral, Daily        Meds Infusions       DVT prophylaxis  Mechanical Order History:      Ordered        10/30/20 1237  Place Sequential Compression Device  Once         10/30/20 1237  Maintain Sequential  Compression Device  Continuous                 Pharmalogical Order History:     None          Meds PRN  •  acetaminophen **OR** acetaminophen  •  albuterol  •  aluminum-magnesium hydroxide-simethicone  •  bisacodyl  •  ipratropium-albuterol  •  magnesium hydroxide  •  magnesium sulfate **OR** magnesium sulfate in D5W 1g/100mL (PREMIX)  •  nitroglycerin  •  ondansetron **OR** ondansetron  •  potassium chloride **OR** potassium chloride **OR** potassium chloride  •  sodium chloride  •  sodium chloride  •  sodium chloride    Procedures:        Assessment/Plan     Active Hospital Problems    Diagnosis  POA   • Essential hypertension [I10]  Yes   • B12 deficiency [E53.8]  Yes   • Cerebrovascular accident (CVA) due to embolism of left middle cerebral artery (CMS/HCC) [I63.412]  Yes      Resolved Hospital Problems   No resolved problems to display.        MEDICAL DECISION MAKING COMPLEXITY BY PROBLEM:       Hypertension:  Continue home medications   Options include-  beta-blockers  Calcium channel blockers  ACE inhibitor  Vasodilators  Low-dose diuretics  PRN medications have not been shown to affect outcomes-to be avoided      B12 deficiency:  Supplementation oral    Embolic CVA  Status post TPA  Await NISHA    Thank you for the consult.  We shall follow closely.  Disposition        I discussed the patients findings and my recommendations with patient and nursing.     Federico Gracia MD  10/31/20  19:00 EDT      MDM                Electronically signed by Federico Gracia MD at 10/31/20 7558     SANTOSH Bedolla MD at 10/30/20 5513          CARDIOLOGY CONSULT NOTE      Referring Provider: Neurology consultant    Reason for Consultation: Acute CVA; possible cardioembolic events    Attending: Jose Guadalupe Reyes MD    Chief complaint    Presented with right face and arm weakness/numbness and loss of speech    Subjective .     History of present illness:  Sunitha Louie is a 40 y.o. female who presents with sudden onset of right  facial droop and right arm weakness in addition to dysarthria that came on around 930 this morning while sitting on the couch watching television.  She presented to ED where she underwent CT evaluation per protocol and subsequently treated with alteplase, as she presented within the window for thrombolytic therapy with focal deficits and no history of recent surgery or active bleeding or known bleeding disorders.    Subsequent MRI evaluation showed multiple embolic events in left frontal region suggesting possible cardioembolic etiology.  Consequently, cardiology was asked to see patient in consultation for possible cardiac embolic assessment.    At time of my exam, she is much improved with only modest right facial and right arm weakness and much improved dysarthralgia.  The patient denies any prior history of organic heart disease aside from possible essential hypertension though she is currently on no specific therapy for treatment of elevated blood pressure readings.  She has a longstanding tobacco user with COPD and claims she does have dyslipidemia, but was not on a statin therapy.  The patient specifically denies any prior history of atrial fibrillation's or known cardiac rhythm issues she is overall not terribly knowledgeable about her past medical issues.    .     Review of Systems   General: denies fever, chills, anorexia, weight loss  Eyes: denies blurring, diplopia  Ear/Nose/Throat: denies ear pain, nosebleeds, hoarseness  Cardiovascular: See HPI  Respiratory: denies excessive sputum, hemoptysis, but does have occasional wheezing  Gastrointestinal: denies nausea, vomiting, change in bowel habits, abdominal pain  Genitourinary: denies dysuria and hematuria  Musculoskeletal: denies back pain, joint pain, joint swelling, muscle cramps, weakness  Skin: denies rashes, itching, suspicious lesions  Neurologic: Right-sided facial right arm and speech deficits-see HPI  Psychiatric: denies depression,  anxiety  Endocrine: denies cold intolerance, heat intolerance  Hematologic/Lymphatic: denies abnormal bruising, bleeding  Allergic/Immunologic: denies urticaria or persistent infections      History  Past Medical History:   Diagnosis Date   • Asthma    • COPD (chronic obstructive pulmonary disease) (CMS/[facility])    • GERD (gastroesophageal reflux disease)    • Hyperlipidemia        History reviewed. No pertinent surgical history.    History reviewed. No pertinent family history.    Social History     Tobacco Use   • Smoking status: Current Every Day Smoker   Substance Use Topics   • Alcohol use: Not on file   • Drug use: Not on file        Medications Prior to Admission   Medication Sig Dispense Refill Last Dose   • albuterol sulfate  (90 Base) MCG/ACT inhaler Inhale 2 puffs Every 4 (Four) Hours As Needed for Wheezing.      • budesonide-formoterol (SYMBICORT) 160-4.5 MCG/ACT inhaler Inhale 2 puffs 2 (Two) Times a Day.   10/30/2020 at Unknown time   • metoclopramide (REGLAN) 10 MG tablet Take 10 mg by mouth 2 (two) times a day.   10/30/2020 at Unknown time   • omeprazole (priLOSEC) 40 MG capsule Take 40 mg by mouth Daily.   10/30/2020 at Unknown time         Aspirin, Bee venom, Erythromycin, and Penicillin g    Scheduled Meds:atorvastatin, 80 mg, Oral, Nightly  budesonide-formoterol, 2 puff, Inhalation, BID - RT  clopidogrel, 75 mg, Oral, Daily  metoclopramide, 10 mg, Oral, BID  pantoprazole, 40 mg, Oral, QAM  sodium chloride, 10 mL, Intravenous, Q12H  sodium chloride, 10 mL, Intravenous, Q12H      Continuous Infusions:   PRN Meds:.•  acetaminophen **OR** acetaminophen  •  albuterol  •  aluminum-magnesium hydroxide-simethicone  •  bisacodyl  •  ipratropium-albuterol  •  magnesium hydroxide  •  magnesium sulfate **OR** magnesium sulfate in D5W 1g/100mL (PREMIX)  •  nitroglycerin  •  ondansetron **OR** ondansetron  •  potassium chloride **OR** potassium chloride **OR** potassium chloride  •  sodium chloride  •   "sodium chloride  •  sodium chloride    Objective     VITAL SIGNS  Vitals:    10/30/20 2039 10/30/20 2051 10/30/20 2151 10/30/20 2251   BP:  167/92 163/72 152/96   BP Location:       Patient Position:       Pulse: 70 64 65 65   Resp: 18 20 20 18   Temp:       TempSrc:       SpO2: 99% 94% 95% 97%   Weight:       Height:           Flowsheet Rows      First Filed Value   Admission Height  155.6 cm (61.25\") Documented at 10/30/2020 1139   Admission Weight  74.3 kg (163 lb 12.8 oz) Documented at 10/30/2020 1139         TELEMETRY: Sinus rhythm with heart rate 70s    Physical Exam:  General Appearance:    Alert, cooperative, in no acute distress   Head:    Normocephalic, without obvious abnormality, atraumatic   Eyes:            Lids and lashes normal, conjunctivae and sclerae normal, no   icterus, no pallor, corneas clear, PERRLA   Ears:    Ears appear intact with no abnormalities noted   Oropharynx:   No oral lesions, no thrush, oral mucosa moist; poor dentition   Neck:   No adenopathy, supple, trachea midline, no thyromegaly, no   carotid bruit, no JVD   Lungs:     Clear to auscultation,respirations regular, even and                  unlabored    Heart:    Regular rhythm and normal rate, normal S1 and S2, no            murmur, no gallop, no rub, no click   Chest Wall/Thorax:    No abnormalities observed   Abdomen:     Normal bowel sounds, no masses, no organomegaly, soft        non-tender, non-distended, no guarding, no rebound                tenderness   Rectal:     Deferred   Extremities:   Moves all extremities well, no edema, no cyanosis, no             redness   Pulses:   Pulses palpable and equal bilaterally   Skin:   No bleeding, bruising or rash   Lymph nodes:   No palpable adenopathy   Neurologic:   Cranial nerves 2 - 12 grossly intact, sensation intact, DTR       present and equal bilaterally; exhibits mild dysarthria        Results Review:    CBC    Results from last 7 days   Lab Units 10/30/20  1143   WBC " 10*3/mm3 11.00*   HEMOGLOBIN g/dL 14.1   PLATELETS 10*3/mm3 315     BMP   Results from last 7 days   Lab Units 10/30/20  1143   SODIUM mmol/L 145   POTASSIUM mmol/L 3.0*   CHLORIDE mmol/L 106   CO2 mmol/L 27.0   BUN mg/dL 3*   CREATININE mg/dL 0.50*   GLUCOSE mg/dL 94     Coag   Results from last 7 days   Lab Units 10/30/20  1143   INR  <0.93*   APTT seconds 23.4*     HbA1C No results found for: HGBA1C  CMP   Results from last 7 days   Lab Units 10/30/20  1143   SODIUM mmol/L 145   POTASSIUM mmol/L 3.0*   CHLORIDE mmol/L 106   CO2 mmol/L 27.0   BUN mg/dL 3*   CREATININE mg/dL 0.50*   GLUCOSE mg/dL 94   ALBUMIN g/dL 4.20   BILIRUBIN mg/dL 0.2   ALK PHOS U/L 108   AST (SGOT) U/L 26   ALT (SGPT) U/L 28     Radiology(recent) Ct Angiogram Head    Result Date: 10/30/2020  Major arterial vasculature within head and neck appears widely patent, with no hemodynamically significant stenosis, dissection, thrombus, or aneurysm.  Electronically Signed By-DR. Sharan Ball MD On:10/30/2020 1:03 PM This report was finalized on [id number] by DR. Sharan Blal MD.    Ct Angiogram Neck    Result Date: 10/30/2020  Major arterial vasculature within head and neck appears widely patent, with no hemodynamically significant stenosis, dissection, thrombus, or aneurysm.  Electronically Signed By-DR. Sharan Ball MD On:10/30/2020 1:03 PM This report was finalized on [id number] by DR. Sharan Ball MD.    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 No hemorrhagic transformation or significant mass effect. 2.Findings suggestive of minimal chronic small vessel ischemic disease. 3.Moderate mucous retention cyst within left frontal sinus.  Electronically Signed By-DR. Sharan Ball MD On:10/30/2020 2:28 PM This report was finalized on [id number] by DR. Sharan Ball MD.    Xr Chest 1 View    Result Date: 10/30/2020  Patchy left lower lobe airspace  disease suggestive of pneumonia.  Electronically Signed By-Anthony Durbin On:10/30/2020 12:20 PM This report was finalized on [id number] by  Anthony Durbin, .    Ct Head Without Contrast Stroke Protocol    Result Date: 10/30/2020  Paranasal sinus disease. No acute intracranial findings.  Electronically Signed By-Dr. January Patel MD On:10/30/2020 11:41 AM This report was finalized on [id number] by Dr. January Patel MD.    Ct Cerebral Perfusion With & Without Contrast    Result Date: 10/30/2020  Normal cerebral perfusion.  Electronically Signed By-DR. Sharan Ball MD On:10/30/2020 12:03 PM This report was finalized on [id number] by DR. Sharan Ball MD.      EKG 1: Abnormal EKG shows sinus rhythm with prominent first-degree AV block and left bundle branch block pattern      ECHOCARDIOGRAM: Pending      STRESS MYOVIEW: No previous    CARDIAC CATHETERIZATION: No previous    OTHER:         Assessment/Plan     39 yo WF presents with acute CVA with right hemiparesis and dysarthria  --MRI shows multiple left frontal lacunar infarcts suggesting embolic etiology  --Status post alteplase therapy with good clinical response  --Initiate aspirin and Plavix therapy provided follow-up scan shows no evidence of hemorrhage  --Lipitor 40 mg p.o. daily  --We will review transthoracic echocardiogram from earlier today; pending these results we will consider transesophageal echocardiogram    Accelerated hypertension  --Careful titration of antihypertensive therapy with initial goal systolic 140 to 180 mmHg    Longstanding tobacco use with COPD  -Continue maintenance nebulized therapy and inhaled steroid treatment  --Strong emphasis on tobacco cessation      We will follow hemodynamics closely with further recommendations ending patient's clinical course    I discussed the patient's findings and my recommendations with patient and bedside nurse for coordination of care    SANTOSH Bedolla MD  10/31/20  00:04 EDT      This  report was generated using the Dragon voice recognition system.          Electronically signed by SANTOSH Bedolla MD at 10/31/20 0030     Jewels SextonAYDEN at 10/30/20 1237     Attestation signed by Delmer Perry MD at 10/31/20 2046    I have reviewed this documentation and agree.                        Primary Care Provider: Griffin Lynn MD     Consult requested by: Dr. Mills    Reason for Consultation: Neurological evaluation, Code stroke     History taken from: patient chart RN    Chief complaint: Right arm & face weakness, dysarthria     Subjective   SUBJECTIVE:    History of present illness: Ms. Louie is a 40-year-old female that presented to the emergency department after onset around 930 this morning of right facial droop and right arm weakness.  She denies any history of stroke or TIA type symptoms.  She also states that her speech is not normal.  Code stroke was initiated on arrival to [facility], patient was examined in CT suite.  Patient states she still feels like her right arm is weak and has not improving if anything it is worse.  She denies any recent history of head trauma, no recent surgery or active bleeding, no history of bleeding disorders.  Discussed alteplase with patient for stroke symptoms.  Patient is within window and has focal deficit.  Discussed risk of bleeding with patient, patient agreed to accept alteplase.  CT head completed without any obvious acute findings.  CTA reviewed and discussed with Dr. Ball, no obvious large vessel occlusion seen.    Review of Systems   Eyes: Negative for visual disturbance.   Cardiovascular: Negative.    Neurological: Positive for facial asymmetry, speech difficulty, weakness, numbness and headaches. Negative for dizziness, tremors, seizures, syncope and light-headedness.          PATIENT HISTORY:  No past medical history on file., No past surgical history on file., No family history on file.,   Social History      Tobacco Use   • Smoking status: Not on file   Substance Use Topics   • Alcohol use: Not on file   • Drug use: Not on file   , (Not in a hospital admission)  , Scheduled Meds:  alteplase, 0.81 mg/kg, Intravenous, Once  atorvastatin, 80 mg, Oral, Nightly  [START ON 10/31/2020] clopidogrel, 75 mg, Oral, Daily  sodium chloride, 10 mL, Intravenous, Q12H  sodium chloride, 100 mL, Intravenous, Once    , Continuous Infusions:   , PRN Meds:  sodium chloride  •  sodium chloride, Allergies:  Aspirin, Bee venom, Erythromycin, and Penicillin g    ________________________________________________________     Objective   OBJECTIVE:    PHYSICAL EXAM:    Constitutional: The patient is awake and alert. There is no shortness of breath.     PSYCHIATRIC: Appropriate    HEENT:  Normocephalic, atraumatic.     Chest: Breathing unlabored    Cardiac: Regular rate and rhythm.     Extremities:  No clubbing, cyanosis or edema.    NEUROLOGICAL:    Cognition:   Oriented x 3  Fund of knowledge good.  Mild dysarthria   Concentration and attention normal.   Language normal with normal comprehension, fluent speech, intact repetition and naming.   Short and long term memory appears intact    Cranial nerves;    II - pupils bilaterally equal reacting to light,  No new Visual field deficits;  Fundoscopic exam- Not able to be done, non-dilated exam  III,IV,VI: EOMI with no diplopia  V: Decreased right facial sensations  VII: Questionable very mild right facial asymmetry,  VIII: No New hearing abnormality  IX, X, XI: normal gag and shoulder shrug;  XII: tongue is in the midline.    Sensory:  Intact to light touch in all extremities.     Motor: Strength right upper extremity 3/5, drift to bed.  Right lower extremity 5/5. No involuntary movements present. Normal tone and bulk.    Cerebellar: Unable check nose to finger on right upper extremity due to weakness.    Gait and balance: Deferred.     Physical exam performed by Jewels Sexton  ARNP.    NIHSS:    Level Of Consciousness 0  0=Alert; keenly responsive 1=Not alert, but arousable by minor stimulation 2=Not alert, requires repeated stimulation 3=Responds only with reflex movements    LOC Questions to Month and age 0  0=Answers both questions correctly 1=Answers one question correctly 2=Answers neither question correctly    LOC Commands      -Open/Close eyes 0    -Open/close  0   0=Performs both tasks correctly 1=Performs one task correctly 2=Performs neighter task correctly     Best Gaze 0  0=Normal 1=Partial gaze palsy 2=Forced deviation, or total gaze paresis    Visual 0  0=No visual loss 1=Partial hemianopia 2=Complete hemianopia 3=Bilateral hemianopia (blind including cortical blindness)    Facial Palsy 1  0=Normal symmetrical movement 1=Minor paralysis (asymmetry) 2=Partial paralysis (lower facde) 3=Complete paralysis (upper and lower face)    Motor: Left Arm-0  Left leg-0; Right Arm-2 Right Leg-0  0=No drift, limb holds posture for full 10 seconds 1=Drift, limb holds posture, no drift to bed 2=Some antigravity effort, cannot maintain posture, drifts to bed 3=No effort against gravity, limb falls 4=No movement    Limb Ataxia 0  0=Absent 1=Present in one limb 2=Present in two limbs    Sensory 1  0=Normal 1=Mild to moderate sensory loss 2=Severe to total sensory loss    Best Language 0   0=No aphasia, normal 1=Mild to moderate aphasia 2=Severe Aphasia (very few words correct or understood)3=Multe, global aphasia    Dysarthria 1  0=Normal 1=Mild to moderate 2=Severe, unintelligible or mute/anarthric    Extinction/Neglect 0   0=No abnormality 1=Extinction to bilateral simultaneous stimulation 2=Profound neglect    Total: 5  Blood pressure: 158/60, blood glucose: 108  Alteplase bolus started at 11:51 AM  Risk versus benefit discussed with patient who agreed, exclusion criteria reviewed.   ________________________________________________________   RESULTS REVIEW:    VITAL SIGNS:   Temp:   [97.8 °F (36.6 °C)] 97.8 °F (36.6 °C)  Heart Rate:  [68-79] 68  Resp:  [16-21] 16  BP: (138-156)/(78-90) 138/78     LABS:  WBC   Date Value Ref Range Status   10/30/2020 11.00 (H) 3.40 - 10.80 10*3/mm3 Final     RBC   Date Value Ref Range Status   10/30/2020 4.88 3.77 - 5.28 10*6/mm3 Final     Hemoglobin   Date Value Ref Range Status   10/30/2020 14.1 12.0 - 15.9 g/dL Final     Hematocrit   Date Value Ref Range Status   10/30/2020 43.6 34.0 - 46.6 % Final     MCV   Date Value Ref Range Status   10/30/2020 89.4 79.0 - 97.0 fL Final     MCH   Date Value Ref Range Status   10/30/2020 28.9 26.6 - 33.0 pg Final     MCHC   Date Value Ref Range Status   10/30/2020 32.3 31.5 - 35.7 g/dL Final     RDW   Date Value Ref Range Status   10/30/2020 17.2 (H) 12.3 - 15.4 % Final     RDW-SD   Date Value Ref Range Status   10/30/2020 53.8 37.0 - 54.0 fl Final     MPV   Date Value Ref Range Status   10/30/2020 8.4 6.0 - 12.0 fL Final     Platelets   Date Value Ref Range Status   10/30/2020 315 140 - 450 10*3/mm3 Final     Neutrophil %   Date Value Ref Range Status   10/30/2020 64.2 42.7 - 76.0 % Final     Lymphocyte %   Date Value Ref Range Status   10/30/2020 26.2 19.6 - 45.3 % Final     Monocyte %   Date Value Ref Range Status   10/30/2020 8.2 5.0 - 12.0 % Final     Eosinophil %   Date Value Ref Range Status   10/30/2020 0.7 0.3 - 6.2 % Final     Basophil %   Date Value Ref Range Status   10/30/2020 0.7 0.0 - 1.5 % Final     Neutrophils, Absolute   Date Value Ref Range Status   10/30/2020 7.10 (H) 1.70 - 7.00 10*3/mm3 Final     Lymphocytes, Absolute   Date Value Ref Range Status   10/30/2020 2.90 0.70 - 3.10 10*3/mm3 Final     Monocytes, Absolute   Date Value Ref Range Status   10/30/2020 0.90 0.10 - 0.90 10*3/mm3 Final     Eosinophils, Absolute   Date Value Ref Range Status   10/30/2020 0.10 0.00 - 0.40 10*3/mm3 Final     Basophils, Absolute   Date Value Ref Range Status   10/30/2020 0.10 0.00 - 0.20 10*3/mm3 Final     nRBC   Date  Value Ref Range Status   10/30/2020 0.1 0.0 - 0.2 /100 WBC Final     Glucose   Date Value Ref Range Status   10/30/2020 94 65 - 99 mg/dL Final     BUN   Date Value Ref Range Status   10/30/2020 3 (L) 6 - 20 mg/dL Final     Creatinine   Date Value Ref Range Status   10/30/2020 0.50 (L) 0.57 - 1.00 mg/dL Final     Sodium   Date Value Ref Range Status   10/30/2020 145 136 - 145 mmol/L Final     Potassium   Date Value Ref Range Status   10/30/2020 3.0 (L) 3.5 - 5.2 mmol/L Final     Comment:     Slight hemolysis detected by analyzer. Results may be affected.     Chloride   Date Value Ref Range Status   10/30/2020 106 98 - 107 mmol/L Final     CO2   Date Value Ref Range Status   10/30/2020 27.0 22.0 - 29.0 mmol/L Final     Calcium   Date Value Ref Range Status   10/30/2020 10.1 8.6 - 10.5 mg/dL Final     Total Protein   Date Value Ref Range Status   10/30/2020 6.9 6.0 - 8.5 g/dL Final     Albumin   Date Value Ref Range Status   10/30/2020 4.20 3.50 - 5.20 g/dL Final     ALT (SGPT)   Date Value Ref Range Status   10/30/2020 28 1 - 33 U/L Final     AST (SGOT)   Date Value Ref Range Status   10/30/2020 26 1 - 32 U/L Final     Alkaline Phosphatase   Date Value Ref Range Status   10/30/2020 108 39 - 117 U/L Final     Total Bilirubin   Date Value Ref Range Status   10/30/2020 0.2 0.0 - 1.2 mg/dL Final     eGFR Non  Amer   Date Value Ref Range Status   10/30/2020 135 >60 mL/min/1.73 Final     BUN/Creatinine Ratio   Date Value Ref Range Status   10/30/2020 6.0 (L) 7.0 - 25.0 Final     Anion Gap   Date Value Ref Range Status   10/30/2020 12.0 5.0 - 15.0 mmol/L Final       Lab Results   Component Value Date     (H) 01/30/2018         IMAGING STUDIES:  Xr Chest 1 View    Result Date: 10/30/2020  Patchy left lower lobe airspace disease suggestive of pneumonia.  Electronically Signed By-Anthony Durbin On:10/30/2020 12:20 PM This report was finalized on [id number] by  Anthony Durbin, .    Ct Head Without Contrast Stroke  Protocol    Result Date: 10/30/2020  Paranasal sinus disease. No acute intracranial findings.  Electronically Signed By-Dr. January Patel MD On:10/30/2020 11:41 AM This report was finalized on [id number] by Dr. January Patel MD.    Ct Cerebral Perfusion With & Without Contrast    Result Date: 10/30/2020  Normal cerebral perfusion.  Electronically Signed By-DR. Sharan Ball MD On:10/30/2020 12:03 PM This report was finalized on [id number] by DR. Sharan Ball MD.      I reviewed the patient's new clinical results.      ________________________________________________________     PROBLEM LIST:    * No active hospital problems. *          Assessment/Plan   ASSESSMENT/PLAN:  1.  Multiple acute small strokes within the left frontal lobe.  The strokes are within the left MCA territory and are concerning for etiology source. Patient received alteplase at 1151 on 10/30/20.  No obvious large vessel occlusion seen.  Stroke work-up pending.   - CT head reviewed with no acute findings.   - CTA head and neck: No obvious large vessel occlusion, awaiting official read.   - Normal cerebral perfusion  - MRI brain pending   - Echo pending   - EKG: Sinus rhythm rate 77, NY interval 286 prolonged  - Labs: A1C: P, B12: P, LDL:  P, TSH: P  - Antithrombotics: Patient is post TPA at 11:51 AM on 10-.  Patient has allergy to aspirin, start Plavix 75 mg tomorrow after repeat CT head is negative.  - Statin: Lipitor 40  Patient is post tPA, please see order set   - Please admit to ICU for at least 12 hours observation  - Keep systolic blood pressure less than 180/105, Cardene drip as needed, avoid hypotension, vital signs per protocol.  - NPO until bedside swallow test completed and passed and/or cleared by speech therapy.   - Closely monitor neurological status, NIH every shift and with changes in neurological status  - Please call with any acute onset of headache, stat CT head at that time.  - Please monitor and call  ICU team with any active bleeding.  - PT/OT/ST as appropriate, Neuro checks per protocol, DVT prophylaxis, Stroke education  - Cardiology consult for embolic strokes.    2. History of hyperlipidemia  - Statin & dietary modifications    3. Hypertension in ED  - 156/89 on admission  - No known history of hypertension, closely monitor     4. Modification of stroke risk factors:   - Blood pressure should be less than 130/80 outpatient, HbA1c less than 6.5, LDL less than 70; b12>500 and smoking cessation if applicable. We would be grateful if the primary team / primary care physician would keep a close watch on the above targets.  - Stroke education  - Follow up with neurologist of choice    We will follow.  Please call with any questions or concerns or neurological changes.  See above TPA orders.       Jewels Liban Sexton, APRN  10/30/20  12:37 EDT          Electronically signed by Delmer Perry MD at 10/31/20 1451          Discharge Summary      Federico Gracia MD at 11/01/20 2115            Date of Admission: 10/30/2020  258/1    Date of Discharge:  11/1/2020    Length of stay:  LOS: 2 days     Patient was examined with relevant and adequate PPE keeping in mind the current coronavirus pandemic.      Presenting Problem/History of Present Illness   Present on Admission:  • Cerebrovascular accident (CVA) due to embolism of left middle cerebral artery (CMS/HCC)  • Essential hypertension  • B12 deficiency  • Hyperlipidemia        Hospital Course  Chief complaint:  Chief Complaint   Patient presents with   • Stroke       Sunitha Louie 40 y.o. female.      40-year-old female presents with right-sided weakness. Patient states she felt fine this morning when she woke up. Around 9:30 AM this morning she started noticing some slurred speech and weakness in her right arm and hand. She states she could not hold anything in her right hand without dropping it. When EMS arrived they found the patient to have a right-sided facial  droop as well. She denies any chest pain or shortness of breath. She has had no headache. No alleviating or exacerbating factors.     Patient had TPA and was transferred to ICU.  She has been seen by a pulmonology, cardiology, neurology.  She states she has no residual deficit now      Patient admitted with findings of acute stroke. She received tpa and was tansferred to icu and followed by card, neuro, intensivist and had pt, ot, slp evaluation.. She has no residual deficit and is being released to home w 30 d event monitor.. echo revealed no source o embolism. She claims allergy to asa hence only plavix. .    ROS  Constitutional: Negative for fever.   HENT: Negative for congestion and sore throat.    Eyes: Negative for redness.   Respiratory: Negative for cough and shortness of breath.    Cardiovascular: Negative for chest pain.   Gastrointestinal: Negative for abdominal pain, diarrhea and vomiting.   Genitourinary: Negative for dysuria.   Musculoskeletal: Negative for back pain.   Skin: Negative for rash.   Neurologic resolved for speech difficulty and weakness. Negative for dizziness and headaches.   Psychiatric/Behavioral: Negative for confusion.         Family History   Family history unknown: Yes        Past Medical History:   Diagnosis Date   • Asthma    • COPD (chronic obstructive pulmonary disease) (CMS/[facility])    • GERD (gastroesophageal reflux disease)    • Hyperlipidemia        History reviewed. No pertinent surgical history.    Social History     Socioeconomic History   • Marital status:      Spouse name: Not on file   • Number of children: Not on file   • Years of education: Not on file   • Highest education level: Not on file   Tobacco Use   • Smoking status: Current Every Day Smoker   Substance and Sexual Activity   • Alcohol use: Not Currently   • Drug use: Not Currently     Comment: Denies any methamphetamine use       Vital Signs  Temp:  [97.6 °F (36.4 °C)-98.1 °F (36.7 °C)] 97.8 °F (36.6  °C)  Heart Rate:  [59-74] 68  Resp:  [16] 16  BP: (140-162)/(72-93) 147/79  Weight change:     Physical Exam:  Physical Exam  Vitals signs and nursing note reviewed.   Constitutional:       General: She is not in acute distress.     Appearance: Normal appearance. She is well-developed. She is not ill-appearing, toxic-appearing or diaphoretic.   HENT:      Head: Normocephalic and atraumatic.      Right Ear: Ear canal and external ear normal.      Left Ear: Ear canal and external ear normal.      Nose: Nose normal. No congestion or rhinorrhea.      Mouth/Throat:      Mouth: Mucous membranes are moist.      Pharynx: No oropharyngeal exudate.      Comments: Multiple broken teeth.  Highly suspicious for methamphetamine use in a 40-year-old.  Eyes:      General: No scleral icterus.        Right eye: No discharge.         Left eye: No discharge.      Extraocular Movements: Extraocular movements intact.      Conjunctiva/sclera: Conjunctivae normal.      Pupils: Pupils are equal, round, and reactive to light.   Neck:      Musculoskeletal: Normal range of motion and neck supple. No neck rigidity or muscular tenderness.      Thyroid: No thyromegaly.      Vascular: No carotid bruit or JVD.      Trachea: No tracheal deviation.   Cardiovascular:      Rate and Rhythm: Normal rate and regular rhythm.      Pulses: Normal pulses.      Heart sounds: Normal heart sounds. No murmur. No friction rub. No gallop.    Pulmonary:      Effort: Pulmonary effort is normal. No respiratory distress.      Breath sounds: Normal breath sounds. No stridor. No wheezing, rhonchi or rales.   Chest:      Chest wall: No tenderness.   Abdominal:      General: Bowel sounds are normal. There is no distension.      Palpations: Abdomen is soft. There is no mass.      Tenderness: There is no abdominal tenderness. There is no guarding or rebound.      Hernia: No hernia is present.   Musculoskeletal: Normal range of motion.         General: No swelling,  tenderness, deformity or signs of injury.      Right lower leg: No edema.      Left lower leg: No edema.   Lymphadenopathy:      Cervical: No cervical adenopathy.   Skin:     General: Skin is warm and dry.      Coloration: Skin is not jaundiced or pale.      Findings: No bruising, erythema or rash.   Neurological:      General: No focal deficit present.      Mental Status: She is alert and oriented to person, place, and time. Mental status is at baseline.      Cranial Nerves: No cranial nerve deficit.      Sensory: No sensory deficit.      Motor: No weakness or abnormal muscle tone.      Coordination: Coordination normal.   Psychiatric:         Mood and Affect: Mood normal.         Behavior: Behavior normal.         Thought Content: Thought content normal.         Judgment: Judgment normal  Vitals signs and nursing note reviewed.   Constitutional:       General: She is not in acute distress.     Appearance: Normal appearance. She is well-developed. She is not ill-appearing, toxic-appearing or diaphoretic.   HENT:      Head: Normocephalic and atraumatic.      Right Ear: Ear canal and external ear normal.      Left Ear: Ear canal and external ear normal.      Nose: Nose normal. No congestion or rhinorrhea.      Mouth/Throat:      Mouth: Mucous membranes are moist.      Pharynx: No oropharyngeal exudate.      Comments: Multiple broken teeth.  Highly suspicious for methamphetamine use in a 40-year-old.  Eyes:      General: No scleral icterus.        Right eye: No discharge.         Left eye: No discharge.      Extraocular Movements: Extraocular movements intact.      Conjunctiva/sclera: Conjunctivae normal.      Pupils: Pupils are equal, round, and reactive to light.   Neck:      Musculoskeletal: Normal range of motion and neck supple. No neck rigidity or muscular tenderness.      Thyroid: No thyromegaly.      Vascular: No carotid bruit or JVD.      Trachea: No tracheal deviation.   Cardiovascular:      Rate and Rhythm:  Normal rate and regular rhythm.      Pulses: Normal pulses.      Heart sounds: Normal heart sounds. No murmur. No friction rub. No gallop.    Pulmonary:      Effort: Pulmonary effort is normal. No respiratory distress.      Breath sounds: Normal breath sounds. No stridor. No wheezing, rhonchi or rales.   Chest:      Chest wall: No tenderness.   Abdominal:      General: Bowel sounds are normal. There is no distension.      Palpations: Abdomen is soft. There is no mass.      Tenderness: There is no abdominal tenderness. There is no guarding or rebound.      Hernia: No hernia is present.   Musculoskeletal: Normal range of motion.         General: No swelling, tenderness, deformity or signs of injury.      Right lower leg: No edema.      Left lower leg: No edema.   Lymphadenopathy:      Cervical: No cervical adenopathy.   Skin:     General: Skin is warm and dry.      Coloration: Skin is not jaundiced or pale.      Findings: No bruising, erythema or rash.   Neurological:      General: No focal deficit present.      Mental Status: She is alert and oriented to person, place, and time. Mental status is at baseline.      Cranial Nerves: No cranial nerve deficit.      Sensory: No sensory deficit.      Motor: No weakness or abnormal muscle tone.      Coordination: Coordination normal.   Psychiatric:         Mood and Affect: Mood normal.         Behavior: Behavior normal.         Thought Content: Thought content normal.         Judgment: Judgment normal            Discharge Diagnosis:     Active Hospital Problems    Diagnosis  POA   • Essential hypertension [I10]  Yes   • B12 deficiency [E53.8]  Yes   • Hyperlipidemia [E78.5]  Yes   • Cerebrovascular accident (CVA) due to embolism of left middle cerebral artery (CMS/HCC) [I63.412]  Yes      Resolved Hospital Problems   No resolved problems to display.       Estimated Creatinine Clearance: 135.2 mL/min (A) (by C-G formula based on SCr of 0.48 mg/dL (L)).    Discharge  Disposition    Continued Care and Services - Discharged on 11/1/2020 Admission date: 10/30/2020 - Discharge disposition: Home or Self Care   Coordination has not been started for this encounter.             PT Recommendation and Plan          Home or Self Care           Discharge Medications      New Medications      Instructions Start Date   amLODIPine 5 MG tablet  Commonly known as: NORVASC   5 mg, Oral, Every 24 Hours Scheduled   Start Date: November 2, 2020     atorvastatin 80 MG tablet  Commonly known as: LIPITOR   80 mg, Oral, Nightly      clopidogrel 75 MG tablet  Commonly known as: PLAVIX   75 mg, Oral, Daily   Start Date: November 2, 2020     cyanocobalamin 1000 MCG tablet  Commonly known as: VITAMIN B-12   1,000 mcg, Oral, Daily   Start Date: November 2, 2020     lisinopril 20 MG tablet  Commonly known as: PRINIVIL,ZESTRIL   20 mg, Oral, Every 24 Hours Scheduled   Start Date: November 2, 2020        Continue These Medications      Instructions Start Date   albuterol sulfate  (90 Base) MCG/ACT inhaler  Commonly known as: PROVENTIL HFA;VENTOLIN HFA;PROAIR HFA   2 puffs, Inhalation, Every 4 Hours PRN      budesonide-formoterol 160-4.5 MCG/ACT inhaler  Commonly known as: SYMBICORT   2 puffs, Inhalation, 2 Times Daily - RT      metoclopramide 10 MG tablet  Commonly known as: REGLAN   10 mg, Oral, 2 times daily      omeprazole 40 MG capsule  Commonly known as: priLOSEC   40 mg, Oral, Daily           Discharge medications personally reviewed by me and med rec done by me personally.  11/01/20, 10:09 PM EST        Consults:   Consults     Date and Time Order Name Status Description    10/31/2020 0843 Inpatient Hospitalist Consult Completed     10/30/2020 1502 Inpatient Cardiology Consult      10/30/2020 1128 Inpatient Neurology Consult Stroke Completed     10/30/2020 1128 Inpatient Neurology Consult Stroke Completed           Procedures Performed:         Pertinent Test Results:   Results from last 7 days    Lab Units 11/01/20  0410 10/31/20  0425 10/30/20  1143   WBC 10*3/mm3 8.30 7.10 11.00*   HEMOGLOBIN g/dL 12.1 11.5* 14.1   HEMATOCRIT % 38.4 35.5 43.6   MCV fL 90.6 89.8 89.4   MCH pg 28.6 29.1 28.9   PLATELETS 10*3/mm3 269 243 315     Results from last 7 days   Lab Units 11/01/20  0410 10/31/20  0425 10/30/20  1143   SODIUM mmol/L 142 144 145   POTASSIUM mmol/L 4.0 4.1 3.0*   CHLORIDE mmol/L 106 108* 106   CO2 mmol/L 27.0 28.0 27.0   BUN mg/dL 7 5* 3*   CREATININE mg/dL 0.48* 0.44* 0.50*   CALCIUM mg/dL 9.7 9.6 10.1   MAGNESIUM mg/dL 2.4 2.5  --    BILIRUBIN mg/dL 0.2 <0.2 0.2   ALK PHOS U/L 97 92 108   ALT (SGPT) U/L 24 21 28   AST (SGOT) U/L 18 16 26   GLUCOSE mg/dL 98 102* 94     Lab Results   Component Value Date    CALCIUM 9.7 11/01/2020    PHOS 3.4 11/01/2020     No results found for: HGBA1C  Lab Results   Component Value Date    CHOL 175 10/31/2020    TRIG 189 (H) 10/31/2020    HDL 37 (L) 10/31/2020     (H) 10/31/2020     No results found for: LIPASE        No results found for: INTRAOP, PREDX, FINALDX, COMDX  Inflammatory Biomarkers        Invalid input(s): ESR, D-DIMER QUANTITATIVE,  PROCALCITONIN  COVID19   Date Value Ref Range Status   10/30/2020 Not Detected Not Detected - Ref. Range Final        Microbiology Results (last 10 days)     Procedure Component Value - Date/Time    COVID PRE-OP / PRE-PROCEDURE SCREENING ORDER (NO ISOLATION) - Swab, Nasopharynx [[id number]]  (Normal) Collected: 10/30/20 1435    Lab Status: Final result Specimen: Swab from Nasopharynx Updated: 10/30/20 1641    Narrative:      The following orders were created for panel order COVID PRE-OP / PRE-PROCEDURE SCREENING ORDER (NO ISOLATION) - Swab, Nasopharynx.  Procedure                               Abnormality         Status                     ---------                               -----------         ------                     Respiratory Panel PCR w/...[[id number]]  Normal              Final result                 Please  view results for these tests on the individual orders.    Respiratory Panel PCR w/COVID-19(SARS-CoV-2) [facility] In-House, NP Swab in UTM/VTM, 3-4 HR TAT - Swab, Nasopharynx [[id number]]  (Normal) Collected: 10/30/20 1435    Lab Status: Final result Specimen: Swab from Nasopharynx Updated: 10/30/20 1641     ADENOVIRUS, PCR Not Detected     Coronavirus 229E Not Detected     Coronavirus HKU1 Not Detected     Coronavirus NL63 Not Detected     Coronavirus OC43 Not Detected     COVID19 Not Detected     Human Metapneumovirus Not Detected     Human Rhinovirus/Enterovirus Not Detected     Influenza A PCR Not Detected     Influenza A H1 Not Detected     Influenza A H1 2009 PCR Not Detected     Influenza A H3 Not Detected     Influenza B PCR Not Detected     Parainfluenza Virus 1 Not Detected     Parainfluenza Virus 2 Not Detected     Parainfluenza Virus 3 Not Detected     Parainfluenza Virus 4 Not Detected     RSV, PCR Not Detected     Bordetella pertussis pcr Not Detected     Bordetella parapertussis PCR Not Detected     Chlamydophila pneumoniae PCR Not Detected     Mycoplasma pneumo by PCR Not Detected    Narrative:      Fact sheet for providers: https://docs.[employer]/wp-content/uploads/EOI5934-9022-VQ3.1-EUA-Provider-Fact-Sheet-3.pdf    Fact sheet for patients: https://docs.[employer]/wp-content/uploads/MDL6510-5725-XV0.1-EUA-Patient-Fact-Sheet-1.pdf          ECG/EMG Results (most recent)     Procedure Component Value Units Date/Time    Adult Transthoracic Echo Complete W/ Cont if Necessary Per Protocol [[id number]] Collected: 10/30/20 1745     Updated: 11/01/20 1007     BSA 1.7 m^2      RVIDd 1.8 cm      IVSd 1.4 cm      LVIDd 3.7 cm      LVIDs 2.5 cm      LVPWd 1.0 cm      IVS/LVPW 1.3     FS 32.9 %      EDV(Teich) 57.2 ml      ESV(Teich) 21.6 ml      EF(Teich) 62.2 %      EDV(cubed) 49.7 ml      ESV(cubed) 15.0 ml      EF(cubed) 69.7 %      LV mass(C)d 148.0 grams      LV mass(C)dI 88.3  grams/m^2      SV(Teich) 35.6 ml      SI(Teich) 21.2 ml/m^2      SV(cubed) 34.6 ml      SI(cubed) 20.7 ml/m^2      Ao root diam 2.5 cm      Ao root area 4.9 cm^2      ACS 1.7 cm      asc Aorta Diam 2.4 cm      LVOT diam 1.9 cm      LVOT area 2.8 cm^2      EDV(MOD-sp4) 58.5 ml      ESV(MOD-sp4) 20.8 ml      EF(MOD-sp4) 64.5 %      SV(MOD-sp4) 37.7 ml      SI(MOD-sp4) 22.5 ml/m^2      Ao root area (BSA corrected) 1.5     LV Dukes Vol (BSA corrected) 34.9 ml/m^2      LV Sys Vol (BSA corrected) 12.4 ml/m^2      Aortic R-R 0.92 sec      Aortic HR 65.1 BPM      MV E max ladarius 72.8 cm/sec      MV A max ladarius 54.0 cm/sec      MV E/A 1.3     MV V2 max 103.5 cm/sec      MV max PG 4.3 mmHg      MV V2 mean 62.3 cm/sec      MV mean PG 1.8 mmHg      MV V2 VTI 23.0 cm      MVA(VTI) 2.2 cm^2      MV dec slope 430.6 cm/sec^2      MV dec time 0.17 sec      Ao pk ladarius 136.5 cm/sec      Ao max PG 7.5 mmHg      Ao max PG (full) 4.1 mmHg      Ao V2 mean 88.6 cm/sec      Ao mean PG 3.8 mmHg      Ao mean PG (full) 2.3 mmHg      Ao V2 VTI 26.0 cm      CHASITY(I,A) 1.9 cm^2      CHASITY(I,D) 1.9 cm^2      CHASITY(V,A) 1.9 cm^2      CHASITY(V,D) 1.9 cm^2      LV V1 max PG 3.4 mmHg      LV V1 mean PG 1.5 mmHg      LV V1 max 91.8 cm/sec      LV V1 mean 55.7 cm/sec      LV V1 VTI 17.6 cm      CO(Ao) 8.3 l/min      CI(Ao) 4.9 l/min/m^2      SV(Ao) 127.0 ml      SI(Ao) 75.8 ml/m^2      CO(LVOT) 3.2 l/min      CI(LVOT) 1.9 l/min/m^2      SV(LVOT) 49.5 ml      SI(LVOT) 29.5 ml/m^2      PA V2 max 111.3 cm/sec      PA max PG 5.0 mmHg      PA max PG (full) 1.6 mmHg      PA V2 mean 72.7 cm/sec      PA mean PG 2.6 mmHg      PA mean PG (full) 1.1 mmHg      PA V2 VTI 24.1 cm      PA acc time 0.12 sec      RV V1 max PG 3.4 mmHg      RV V1 mean PG 1.5 mmHg      RV V1 max 92.0 cm/sec      RV V1 mean 56.2 cm/sec      RV V1 VTI 20.3 cm      TR max ladarius 191.5 cm/sec      RVSP(TR) 17.7 mmHg      RAP systole 3.0 mmHg      PA pr(Accel) 25.9 mmHg      Pulm Sys Ladarius 52.1 cm/sec      Pulm  Dukes Ladarius 38.2 cm/sec      Pulm S/D 1.4     Pulm A Revs Dur 0.12 sec      Pulm A Revs Ladarius 31.4 cm/sec       CV ECHO SHAKIRA - BZI_BMI 28.5 kilograms/m^2       CV ECHO SHAKIRA - BSA(HAYCOCK) 1.7 m^2       CV ECHO SHAKIRA - BZI_METRIC_WEIGHT 68.5 kg       CV ECHO SHAKIRA - BZI_METRIC_HEIGHT 154.9 cm      EF(MOD-bp) 65.0 %      LA dimension(2D) 3.2 cm     Narrative:      · Estimated left ventricular EF was in agreement with the calculated left   ventricular EF. Left ventricular ejection fraction appears to be 61 - 65%.   Left ventricular systolic function is normal.  · Trace to mild mitral valve regurgitation is present.  · Negative agitated saline contrast bubble study for ASD/VSD shunting  · No cardioembolic etiology identified by echo exam       ECG 12 Lead [[id number]] Collected: 10/30/20 1158     Updated: 11/01/20 1056     QT Interval 445 ms     Narrative:      HEART RATE= 77  bpm  RR Interval= 776  ms  CO Interval= 286  ms  P Horizontal Axis= 8  deg  P Front Axis= 91  deg  QRSD Interval= 158  ms  QT Interval= 445  ms  QRS Axis= -49  deg  T Wave Axis= 128  deg  - ABNORMAL ECG -  Sinus rhythm  Prolonged CO interval  When compared with ECG of 23-Jan-2018 13:44:25,  New conduction abnormality  Electronically Signed By: Haja Mills (Calin) 01-Nov-2020 10:55:26  Date and Time of Study: 2020-10-30 11:58:51               Results for orders placed during the hospital encounter of 10/30/20   Adult Transthoracic Echo Complete W/ Cont if Necessary Per Protocol    Narrative · Estimated left ventricular EF was in agreement with the calculated left   ventricular EF. Left ventricular ejection fraction appears to be 61 - 65%.   Left ventricular systolic function is normal.  · Trace to mild mitral valve regurgitation is present.  · Negative agitated saline contrast bubble study for ASD/VSD shunting  · No cardioembolic etiology identified by echo exam          Ct Angiogram Head    Result Date: 10/30/2020  Major arterial vasculature within  head and neck appears widely patent, with no hemodynamically significant stenosis, dissection, thrombus, or aneurysm.  Electronically Signed By-DR. Sharan Ball MD On:10/30/2020 1:03 PM This report was finalized on [id number] by DR. Sharan Ball MD.    Ct Head Without Contrast    Result Date: 10/31/2020  1.No CT findings of acute hemorrhage at this time. 2.Small left frontal infarcts seen on MRI are not clearly visible on this exam.  Electronically Signed By-DR. Joesph Luciano MD On:10/31/2020 1:11 PM This report was finalized on [id number] by DR. Joesph Luciano MD.    Ct Angiogram Neck    Result Date: 10/30/2020  Major arterial vasculature within head and neck appears widely patent, with no hemodynamically significant stenosis, dissection, thrombus, or aneurysm.  Electronically Signed By-DR. Sharan Ball MD On:10/30/2020 1:03 PM This report was finalized on [id number] by DR. Sharan Ball MD.    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 No hemorrhagic transformation or significant mass effect. 2.Findings suggestive of minimal chronic small vessel ischemic disease. 3.Moderate mucous retention cyst within left frontal sinus.  Electronically Signed By-DR. Sharan Ball MD On:10/30/2020 2:28 PM This report was finalized on [id number] by DR. Sharan Ball MD.    Xr Chest 1 View    Result Date: 10/30/2020  Patchy left lower lobe airspace disease suggestive of pneumonia.  Electronically Signed By-Anthony Durbin On:10/30/2020 12:20 PM This report was finalized on [id number] by  Anthony Durbin, .    Ct Head Without Contrast Stroke Protocol    Result Date: 10/30/2020  Paranasal sinus disease. No acute intracranial findings.  Electronically Signed By-Dr. January Patel MD On:10/30/2020 11:41 AM This report was finalized on [id number] by Dr. January Patel MD.    Ct Cerebral Perfusion With & Without Contrast    Result  Date: 10/30/2020  Normal cerebral perfusion.  Electronically Signed By-DR. Sharan Ball MD On:10/30/2020 12:03 PM This report was finalized on [id number] by DR. Sharan Ball MD.      Xrays, labs reviewed personally by me.  11/01/20  10:09 PM EST      Condition on Discharge:    Stable    Discharge Diet:   Dietary Orders (From admission, onward)     Start     Ordered    10/30/20 1514  Diet Cardiac; Healthy Heart  Diet Effective Now     Question Answer Comment   Diet / Texture / Consistency Cardiac    Select Type: Healthy Heart        10/30/20 1513                Activity at Discharge:   Activity Instructions     Activity as Tolerated            Follow-up Appointments    No future appointments.    Additional Instructions for the Follow-ups that You Need to Schedule     Discharge Follow-up with PCP   As directed       Currently Documented PCP:    Griffin Lynn MD    PCP Phone Number:    [phone]     Follow Up Details: If no PCP, call MD finder at [phone]           Follow-up Information     SANTOSH Bedolla MD Follow up in 1 month(s).    Specialty: Cardiology  Contact information:  [address] [facility]  [place] [address] [phone]             Seipel, Joseph F, MD Follow up in 1 month(s).    Specialties: Sleep Medicine, Neurology  Contact information:  [address]  [place] [address] [phone]             Griffin Lynn MD .    Specialty: Family Medicine  Why: If no PCP, call MD finder at [phone]  Contact information:  [address] [facility] [place] [address] [phone]                    Test Results Pending at Discharge  Pending Labs     Order Current Status    Hemoglobin A1c In process           Risk for Readmission (LACE) Score: 6 (11/1/2020  6:00 AM)        Time: I spent  more than  40  minutes on this discharge activity which included: face-to-face encounter with the patient, reviewing the data in the system, coordination of the care with the  nursing staff as well as consultants, documentation, and entering orders.   Care coordination with nursing regarding dc planning.         Federico Gracia MD  11/01/20  22:09 EST            Electronically signed by Federico Gracia MD at 11/01/20 [id number]

## 2020-11-02 NOTE — NURSING NOTE
Dr. Gracia discharging this pt to home. Pt is to receive a event monitor prior to dc. RT called demi while Dr. Gracia still here to verify that they can do tonight. Jacqueline RT stated she can do and will be up shortly (approx 715 pm). Pt has dc instruction and is calling her boyfriend to come and get her.

## 2020-11-02 NOTE — PROGRESS NOTES
Case Management Discharge Note                Selected Continued Care - Discharged on 11/1/2020 Admission date: 10/30/2020 - Discharge disposition: Home or Self Care                 Final Discharge Disposition Code: 01 - home or self-care

## 2020-11-02 NOTE — DISCHARGE SUMMARY
Date of Admission: 10/30/2020  258/1    Date of Discharge:  11/1/2020    Length of stay:  LOS: 2 days     Patient was examined with relevant and adequate PPE keeping in mind the current coronavirus pandemic.      Presenting Problem/History of Present Illness   Present on Admission:  • Cerebrovascular accident (CVA) due to embolism of left middle cerebral artery (CMS/HCC)  • Essential hypertension  • B12 deficiency  • Hyperlipidemia        Hospital Course  Chief complaint:  Chief Complaint   Patient presents with   • Stroke       Sunitha A Louie 40 y.o. female.      40-year-old female presents with right-sided weakness. Patient states she felt fine this morning when she woke up. Around 9:30 AM this morning she started noticing some slurred speech and weakness in her right arm and hand. She states she could not hold anything in her right hand without dropping it. When EMS arrived they found the patient to have a right-sided facial droop as well. She denies any chest pain or shortness of breath. She has had no headache. No alleviating or exacerbating factors.     Patient had TPA and was transferred to ICU.  She has been seen by a pulmonology, cardiology, neurology.  She states she has no residual deficit now      Patient admitted with findings of acute stroke. She received tpa and was tansferred to icu and followed by card, neuro, intensivist and had pt, ot, slp evaluation.. She has no residual deficit and is being released to home w 30 d event monitor.. echo revealed no source o embolism. She claims allergy to asa hence only plavix. .    ROS  Constitutional: Negative for fever.   HENT: Negative for congestion and sore throat.    Eyes: Negative for redness.   Respiratory: Negative for cough and shortness of breath.    Cardiovascular: Negative for chest pain.   Gastrointestinal: Negative for abdominal pain, diarrhea and vomiting.   Genitourinary: Negative for dysuria.   Musculoskeletal: Negative for back pain.   Skin:  Negative for rash.   Neurologic resolved for speech difficulty and weakness. Negative for dizziness and headaches.   Psychiatric/Behavioral: Negative for confusion.         Family History   Family history unknown: Yes        Past Medical History:   Diagnosis Date   • Asthma    • COPD (chronic obstructive pulmonary disease) (CMS/[facility])    • GERD (gastroesophageal reflux disease)    • Hyperlipidemia        History reviewed. No pertinent surgical history.    Social History     Socioeconomic History   • Marital status:      Spouse name: Not on file   • Number of children: Not on file   • Years of education: Not on file   • Highest education level: Not on file   Tobacco Use   • Smoking status: Current Every Day Smoker   Substance and Sexual Activity   • Alcohol use: Not Currently   • Drug use: Not Currently     Comment: Denies any methamphetamine use       Vital Signs  Temp:  [97.6 °F (36.4 °C)-98.1 °F (36.7 °C)] 97.8 °F (36.6 °C)  Heart Rate:  [59-74] 68  Resp:  [16] 16  BP: (140-162)/(72-93) 147/79  Weight change:     Physical Exam:  Physical Exam  Vitals signs and nursing note reviewed.   Constitutional:       General: She is not in acute distress.     Appearance: Normal appearance. She is well-developed. She is not ill-appearing, toxic-appearing or diaphoretic.   HENT:      Head: Normocephalic and atraumatic.      Right Ear: Ear canal and external ear normal.      Left Ear: Ear canal and external ear normal.      Nose: Nose normal. No congestion or rhinorrhea.      Mouth/Throat:      Mouth: Mucous membranes are moist.      Pharynx: No oropharyngeal exudate.      Comments: Multiple broken teeth.  Highly suspicious for methamphetamine use in a 40-year-old.  Eyes:      General: No scleral icterus.        Right eye: No discharge.         Left eye: No discharge.      Extraocular Movements: Extraocular movements intact.      Conjunctiva/sclera: Conjunctivae normal.      Pupils: Pupils are equal, round, and reactive  to light.   Neck:      Musculoskeletal: Normal range of motion and neck supple. No neck rigidity or muscular tenderness.      Thyroid: No thyromegaly.      Vascular: No carotid bruit or JVD.      Trachea: No tracheal deviation.   Cardiovascular:      Rate and Rhythm: Normal rate and regular rhythm.      Pulses: Normal pulses.      Heart sounds: Normal heart sounds. No murmur. No friction rub. No gallop.    Pulmonary:      Effort: Pulmonary effort is normal. No respiratory distress.      Breath sounds: Normal breath sounds. No stridor. No wheezing, rhonchi or rales.   Chest:      Chest wall: No tenderness.   Abdominal:      General: Bowel sounds are normal. There is no distension.      Palpations: Abdomen is soft. There is no mass.      Tenderness: There is no abdominal tenderness. There is no guarding or rebound.      Hernia: No hernia is present.   Musculoskeletal: Normal range of motion.         General: No swelling, tenderness, deformity or signs of injury.      Right lower leg: No edema.      Left lower leg: No edema.   Lymphadenopathy:      Cervical: No cervical adenopathy.   Skin:     General: Skin is warm and dry.      Coloration: Skin is not jaundiced or pale.      Findings: No bruising, erythema or rash.   Neurological:      General: No focal deficit present.      Mental Status: She is alert and oriented to person, place, and time. Mental status is at baseline.      Cranial Nerves: No cranial nerve deficit.      Sensory: No sensory deficit.      Motor: No weakness or abnormal muscle tone.      Coordination: Coordination normal.   Psychiatric:         Mood and Affect: Mood normal.         Behavior: Behavior normal.         Thought Content: Thought content normal.         Judgment: Judgment normal  Vitals signs and nursing note reviewed.   Constitutional:       General: She is not in acute distress.     Appearance: Normal appearance. She is well-developed. She is not ill-appearing, toxic-appearing or  diaphoretic.   HENT:      Head: Normocephalic and atraumatic.      Right Ear: Ear canal and external ear normal.      Left Ear: Ear canal and external ear normal.      Nose: Nose normal. No congestion or rhinorrhea.      Mouth/Throat:      Mouth: Mucous membranes are moist.      Pharynx: No oropharyngeal exudate.      Comments: Multiple broken teeth.  Highly suspicious for methamphetamine use in a 40-year-old.  Eyes:      General: No scleral icterus.        Right eye: No discharge.         Left eye: No discharge.      Extraocular Movements: Extraocular movements intact.      Conjunctiva/sclera: Conjunctivae normal.      Pupils: Pupils are equal, round, and reactive to light.   Neck:      Musculoskeletal: Normal range of motion and neck supple. No neck rigidity or muscular tenderness.      Thyroid: No thyromegaly.      Vascular: No carotid bruit or JVD.      Trachea: No tracheal deviation.   Cardiovascular:      Rate and Rhythm: Normal rate and regular rhythm.      Pulses: Normal pulses.      Heart sounds: Normal heart sounds. No murmur. No friction rub. No gallop.    Pulmonary:      Effort: Pulmonary effort is normal. No respiratory distress.      Breath sounds: Normal breath sounds. No stridor. No wheezing, rhonchi or rales.   Chest:      Chest wall: No tenderness.   Abdominal:      General: Bowel sounds are normal. There is no distension.      Palpations: Abdomen is soft. There is no mass.      Tenderness: There is no abdominal tenderness. There is no guarding or rebound.      Hernia: No hernia is present.   Musculoskeletal: Normal range of motion.         General: No swelling, tenderness, deformity or signs of injury.      Right lower leg: No edema.      Left lower leg: No edema.   Lymphadenopathy:      Cervical: No cervical adenopathy.   Skin:     General: Skin is warm and dry.      Coloration: Skin is not jaundiced or pale.      Findings: No bruising, erythema or rash.   Neurological:      General: No focal  deficit present.      Mental Status: She is alert and oriented to person, place, and time. Mental status is at baseline.      Cranial Nerves: No cranial nerve deficit.      Sensory: No sensory deficit.      Motor: No weakness or abnormal muscle tone.      Coordination: Coordination normal.   Psychiatric:         Mood and Affect: Mood normal.         Behavior: Behavior normal.         Thought Content: Thought content normal.         Judgment: Judgment normal            Discharge Diagnosis:     Active Hospital Problems    Diagnosis  POA   • Essential hypertension [I10]  Yes   • B12 deficiency [E53.8]  Yes   • Hyperlipidemia [E78.5]  Yes   • Cerebrovascular accident (CVA) due to embolism of left middle cerebral artery (CMS/HCC) [I63.412]  Yes      Resolved Hospital Problems   No resolved problems to display.       Estimated Creatinine Clearance: 135.2 mL/min (A) (by C-G formula based on SCr of 0.48 mg/dL (L)).    Discharge Disposition    Continued Care and Services - Discharged on 11/1/2020 Admission date: 10/30/2020 - Discharge disposition: Home or Self Care   Coordination has not been started for this encounter.             PT Recommendation and Plan          Home or Self Care           Discharge Medications      New Medications      Instructions Start Date   amLODIPine 5 MG tablet  Commonly known as: NORVASC   5 mg, Oral, Every 24 Hours Scheduled   Start Date: November 2, 2020     atorvastatin 80 MG tablet  Commonly known as: LIPITOR   80 mg, Oral, Nightly      clopidogrel 75 MG tablet  Commonly known as: PLAVIX   75 mg, Oral, Daily   Start Date: November 2, 2020     cyanocobalamin 1000 MCG tablet  Commonly known as: VITAMIN B-12   1,000 mcg, Oral, Daily   Start Date: November 2, 2020     lisinopril 20 MG tablet  Commonly known as: PRINIVIL,ZESTRIL   20 mg, Oral, Every 24 Hours Scheduled   Start Date: November 2, 2020        Continue These Medications      Instructions Start Date   albuterol sulfate  (90  Base) MCG/ACT inhaler  Commonly known as: PROVENTIL HFA;VENTOLIN HFA;PROAIR HFA   2 puffs, Inhalation, Every 4 Hours PRN      budesonide-formoterol 160-4.5 MCG/ACT inhaler  Commonly known as: SYMBICORT   2 puffs, Inhalation, 2 Times Daily - RT      metoclopramide 10 MG tablet  Commonly known as: REGLAN   10 mg, Oral, 2 times daily      omeprazole 40 MG capsule  Commonly known as: priLOSEC   40 mg, Oral, Daily           Discharge medications personally reviewed by me and med rec done by me personally.  11/01/20, 10:09 PM EST        Consults:   Consults     Date and Time Order Name Status Description    10/31/2020 0843 Inpatient Hospitalist Consult Completed     10/30/2020 1502 Inpatient Cardiology Consult      10/30/2020 1128 Inpatient Neurology Consult Stroke Completed     10/30/2020 1128 Inpatient Neurology Consult Stroke Completed           Procedures Performed:         Pertinent Test Results:   Results from last 7 days   Lab Units 11/01/20  0410 10/31/20  0425 10/30/20  1143   WBC 10*3/mm3 8.30 7.10 11.00*   HEMOGLOBIN g/dL 12.1 11.5* 14.1   HEMATOCRIT % 38.4 35.5 43.6   MCV fL 90.6 89.8 89.4   MCH pg 28.6 29.1 28.9   PLATELETS 10*3/mm3 269 243 315     Results from last 7 days   Lab Units 11/01/20  0410 10/31/20  0425 10/30/20  1143   SODIUM mmol/L 142 144 145   POTASSIUM mmol/L 4.0 4.1 3.0*   CHLORIDE mmol/L 106 108* 106   CO2 mmol/L 27.0 28.0 27.0   BUN mg/dL 7 5* 3*   CREATININE mg/dL 0.48* 0.44* 0.50*   CALCIUM mg/dL 9.7 9.6 10.1   MAGNESIUM mg/dL 2.4 2.5  --    BILIRUBIN mg/dL 0.2 <0.2 0.2   ALK PHOS U/L 97 92 108   ALT (SGPT) U/L 24 21 28   AST (SGOT) U/L 18 16 26   GLUCOSE mg/dL 98 102* 94     Lab Results   Component Value Date    CALCIUM 9.7 11/01/2020    PHOS 3.4 11/01/2020     No results found for: HGBA1C  Lab Results   Component Value Date    CHOL 175 10/31/2020    TRIG 189 (H) 10/31/2020    HDL 37 (L) 10/31/2020     (H) 10/31/2020     No results found for: LIPASE        No results found for:  INTRAOP, PREDX, FINALDX, COMDX  Inflammatory Biomarkers        Invalid input(s): ESR, D-DIMER QUANTITATIVE,  PROCALCITONIN  COVID19   Date Value Ref Range Status   10/30/2020 Not Detected Not Detected - Ref. Range Final        Microbiology Results (last 10 days)     Procedure Component Value - Date/Time    COVID PRE-OP / PRE-PROCEDURE SCREENING ORDER (NO ISOLATION) - Swab, Nasopharynx [[id number]]  (Normal) Collected: 10/30/20 1435    Lab Status: Final result Specimen: Swab from Nasopharynx Updated: 10/30/20 1641    Narrative:      The following orders were created for panel order COVID PRE-OP / PRE-PROCEDURE SCREENING ORDER (NO ISOLATION) - Swab, Nasopharynx.  Procedure                               Abnormality         Status                     ---------                               -----------         ------                     Respiratory Panel PCR w/...[[id number]]  Normal              Final result                 Please view results for these tests on the individual orders.    Respiratory Panel PCR w/COVID-19(SARS-CoV-2) DANNY/[facility]IMUS/COLETTE/PAD/COR/MAD/SHARRI In-House, NP Swab in UTM/VTM, 3-4 HR TAT - Swab, Nasopharynx [[id number]]  (Normal) Collected: 10/30/20 1435    Lab Status: Final result Specimen: Swab from Nasopharynx Updated: 10/30/20 1641     ADENOVIRUS, PCR Not Detected     Coronavirus 229E Not Detected     Coronavirus HKU1 Not Detected     Coronavirus NL63 Not Detected     Coronavirus OC43 Not Detected     COVID19 Not Detected     Human Metapneumovirus Not Detected     Human Rhinovirus/Enterovirus Not Detected     Influenza A PCR Not Detected     Influenza A H1 Not Detected     Influenza A H1 2009 PCR Not Detected     Influenza A H3 Not Detected     Influenza B PCR Not Detected     Parainfluenza Virus 1 Not Detected     Parainfluenza Virus 2 Not Detected     Parainfluenza Virus 3 Not Detected     Parainfluenza Virus 4 Not Detected     RSV, PCR Not Detected     Bordetella pertussis pcr Not Detected     Bordetella  parapertussis PCR Not Detected     Chlamydophila pneumoniae PCR Not Detected     Mycoplasma pneumo by PCR Not Detected    Narrative:      Fact sheet for providers: https://docs.[employer]/wp-content/uploads/KLL2129-7247-LB1.1-EUA-Provider-Fact-Sheet-3.pdf    Fact sheet for patients: https://docs.[employer]/wp-content/uploads/QXU9755-6824-TG7.1-EUA-Patient-Fact-Sheet-1.pdf          ECG/EMG Results (most recent)     Procedure Component Value Units Date/Time    Adult Transthoracic Echo Complete W/ Cont if Necessary Per Protocol [[id number]] Collected: 10/30/20 1745     Updated: 11/01/20 1007     BSA 1.7 m^2      RVIDd 1.8 cm      IVSd 1.4 cm      LVIDd 3.7 cm      LVIDs 2.5 cm      LVPWd 1.0 cm      IVS/LVPW 1.3     FS 32.9 %      EDV(Teich) 57.2 ml      ESV(Teich) 21.6 ml      EF(Teich) 62.2 %      EDV(cubed) 49.7 ml      ESV(cubed) 15.0 ml      EF(cubed) 69.7 %      LV mass(C)d 148.0 grams      LV mass(C)dI 88.3 grams/m^2      SV(Teich) 35.6 ml      SI(Teich) 21.2 ml/m^2      SV(cubed) 34.6 ml      SI(cubed) 20.7 ml/m^2      Ao root diam 2.5 cm      Ao root area 4.9 cm^2      ACS 1.7 cm      asc Aorta Diam 2.4 cm      LVOT diam 1.9 cm      LVOT area 2.8 cm^2      EDV(MOD-sp4) 58.5 ml      ESV(MOD-sp4) 20.8 ml      EF(MOD-sp4) 64.5 %      SV(MOD-sp4) 37.7 ml      SI(MOD-sp4) 22.5 ml/m^2      Ao root area (BSA corrected) 1.5     LV Dukes Vol (BSA corrected) 34.9 ml/m^2      LV Sys Vol (BSA corrected) 12.4 ml/m^2      Aortic R-R 0.92 sec      Aortic HR 65.1 BPM      MV E max nicolás 72.8 cm/sec      MV A max nicolás 54.0 cm/sec      MV E/A 1.3     MV V2 max 103.5 cm/sec      MV max PG 4.3 mmHg      MV V2 mean 62.3 cm/sec      MV mean PG 1.8 mmHg      MV V2 VTI 23.0 cm      MVA(VTI) 2.2 cm^2      MV dec slope 430.6 cm/sec^2      MV dec time 0.17 sec      Ao pk nicolás 136.5 cm/sec      Ao max PG 7.5 mmHg      Ao max PG (full) 4.1 mmHg      Ao V2 mean 88.6 cm/sec      Ao mean PG 3.8 mmHg      Ao mean PG (full) 2.3 mmHg      Ao V2  VTI 26.0 cm      CHASITY(I,A) 1.9 cm^2      CHASITY(I,D) 1.9 cm^2      CHASITY(V,A) 1.9 cm^2      CHASITY(V,D) 1.9 cm^2      LV V1 max PG 3.4 mmHg      LV V1 mean PG 1.5 mmHg      LV V1 max 91.8 cm/sec      LV V1 mean 55.7 cm/sec      LV V1 VTI 17.6 cm      CO(Ao) 8.3 l/min      CI(Ao) 4.9 l/min/m^2      SV(Ao) 127.0 ml      SI(Ao) 75.8 ml/m^2      CO(LVOT) 3.2 l/min      CI(LVOT) 1.9 l/min/m^2      SV(LVOT) 49.5 ml      SI(LVOT) 29.5 ml/m^2      PA V2 max 111.3 cm/sec      PA max PG 5.0 mmHg      PA max PG (full) 1.6 mmHg      PA V2 mean 72.7 cm/sec      PA mean PG 2.6 mmHg      PA mean PG (full) 1.1 mmHg      PA V2 VTI 24.1 cm      PA acc time 0.12 sec      RV V1 max PG 3.4 mmHg      RV V1 mean PG 1.5 mmHg      RV V1 max 92.0 cm/sec      RV V1 mean 56.2 cm/sec      RV V1 VTI 20.3 cm      TR max ladarius 191.5 cm/sec      RVSP(TR) 17.7 mmHg      RAP systole 3.0 mmHg      PA pr(Accel) 25.9 mmHg      Pulm Sys Ladarius 52.1 cm/sec      Pulm Dukes Ladarius 38.2 cm/sec      Pulm S/D 1.4     Pulm A Revs Dur 0.12 sec      Pulm A Revs Ladarius 31.4 cm/sec       CV ECHO SHAKIRA - BZI_BMI 28.5 kilograms/m^2       CV ECHO SHAKIRA - BSA(HAYCOCK) 1.7 m^2       CV ECHO SHAKIAR - BZI_METRIC_WEIGHT 68.5 kg       CV ECHO SHAKIRA - BZI_METRIC_HEIGHT 154.9 cm      EF(MOD-bp) 65.0 %      LA dimension(2D) 3.2 cm     Narrative:      · Estimated left ventricular EF was in agreement with the calculated left   ventricular EF. Left ventricular ejection fraction appears to be 61 - 65%.   Left ventricular systolic function is normal.  · Trace to mild mitral valve regurgitation is present.  · Negative agitated saline contrast bubble study for ASD/VSD shunting  · No cardioembolic etiology identified by echo exam       ECG 12 Lead [[id number]] Collected: 10/30/20 1158     Updated: 11/01/20 1056     QT Interval 445 ms     Narrative:      HEART RATE= 77  bpm  RR Interval= 776  ms  RI Interval= 286  ms  P Horizontal Axis= 8  deg  P Front Axis= 91  deg  QRSD Interval= 158  ms  QT Interval=  445  ms  QRS Axis= -49  deg  T Wave Axis= 128  deg  - ABNORMAL ECG -  Sinus rhythm  Prolonged MN interval  When compared with ECG of 23-Jan-2018 13:44:25,  New conduction abnormality  Electronically Signed By: Haja Mills (Calin) 01-Nov-2020 10:55:26  Date and Time of Study: 2020-10-30 11:58:51               Results for orders placed during the hospital encounter of 10/30/20   Adult Transthoracic Echo Complete W/ Cont if Necessary Per Protocol    Narrative · Estimated left ventricular EF was in agreement with the calculated left   ventricular EF. Left ventricular ejection fraction appears to be 61 - 65%.   Left ventricular systolic function is normal.  · Trace to mild mitral valve regurgitation is present.  · Negative agitated saline contrast bubble study for ASD/VSD shunting  · No cardioembolic etiology identified by echo exam          Ct Angiogram Head    Result Date: 10/30/2020  Major arterial vasculature within head and neck appears widely patent, with no hemodynamically significant stenosis, dissection, thrombus, or aneurysm.  Electronically Signed By-DR. Sharan Ball MD On:10/30/2020 1:03 PM This report was finalized on [id number] by DR. Sharan Ball MD.    Ct Head Without Contrast    Result Date: 10/31/2020  1.No CT findings of acute hemorrhage at this time. 2.Small left frontal infarcts seen on MRI are not clearly visible on this exam.  Electronically Signed By-DR. Joesph Luciano MD On:10/31/2020 1:11 PM This report was finalized on [id number] by DR. Joesph Luciano MD.    Ct Angiogram Neck    Result Date: 10/30/2020  Major arterial vasculature within head and neck appears widely patent, with no hemodynamically significant stenosis, dissection, thrombus, or aneurysm.  Electronically Signed By-DR. Sharan aBll MD On:10/30/2020 1:03 PM This report was finalized on [id number] by DR. Sharan Ball MD.    Mri Brain Without Contrast    Result Date: 10/30/2020  1.Small acute infarctions  within lateral left frontal lobe. No hemorrhagic transformation or significant mass effect. 2.Findings suggestive of minimal chronic small vessel ischemic disease. 3.Moderate mucous retention cyst within left frontal sinus.  Electronically Signed By-DR. Sharan Ball MD On:10/30/2020 2:28 PM This report was finalized on [id number] by DR. Sharan Ball MD.    Xr Chest 1 View    Result Date: 10/30/2020  Patchy left lower lobe airspace disease suggestive of pneumonia.  Electronically Signed By-Anthony Durbin On:10/30/2020 12:20 PM This report was finalized on [id number] by  Anthony Durbin, .    Ct Head Without Contrast Stroke Protocol    Result Date: 10/30/2020  Paranasal sinus disease. No acute intracranial findings.  Electronically Signed By-Dr. January Patel MD On:10/30/2020 11:41 AM This report was finalized on [id number] by Dr. January Patel MD.    Ct Cerebral Perfusion With & Without Contrast    Result Date: 10/30/2020  Normal cerebral perfusion.  Electronically Signed By-DR. Sharan Ball MD On:10/30/2020 12:03 PM This report was finalized on [id number] by DR. Sharan Ball MD.      Xrays, labs reviewed personally by me.  11/01/20  10:09 PM EST      Condition on Discharge:    Stable    Discharge Diet:   Dietary Orders (From admission, onward)     Start     Ordered    10/30/20 1514  Diet Cardiac; Healthy Heart  Diet Effective Now     Question Answer Comment   Diet / Texture / Consistency Cardiac    Select Type: Healthy Heart        10/30/20 1513                Activity at Discharge:   Activity Instructions     Activity as Tolerated            Follow-up Appointments    No future appointments.    Additional Instructions for the Follow-ups that You Need to Schedule     Discharge Follow-up with PCP   As directed       Currently Documented PCP:    Griffin Lynn MD    PCP Phone Number:    [phone]     Follow Up Details: If no PCP, call MD finder at [phone]           Follow-up  Information     SANTOSH Bedolla MD Follow up in 1 month(s).    Specialty: Cardiology  Contact information:  [address] [facility]  [address]  [place] [id number] [phone]             Seipel, Joseph F, MD Follow up in 1 month(s).    Specialties: Sleep Medicine, Neurology  Contact information:  [address] [facility]  [address]  [place] [address]  [phone]             Griffin Lynn MD .    Specialty: Family Medicine  Why: If no PCP, call MD finder at [phone]  Contact information:  [address] [facility] [place] [address]  [phone]                    Test Results Pending at Discharge  Pending Labs     Order Current Status    Hemoglobin A1c In process           Risk for Readmission (LACE) Score: 6 (11/1/2020  6:00 AM)        Time: I spent  more than  40  minutes on this discharge activity which included: face-to-face encounter with the patient, reviewing the data in the system, coordination of the care with the nursing staff as well as consultants, documentation, and entering orders.   Care coordination with nursing regarding dc planning.         Federico Gracia MD  11/01/20  22:09 EST

## 2020-11-05 NOTE — PAYOR COMM NOTE
"This is discharge notice for Beth Yi, auth# [id number]  Pt discharged routine to home on 11/1/20.    OTIS LI RN  UTILIZATION REVIEW  [facility]  PH: [phone]  FX: [phone]      Beth Yi (40 y.o. Female)     Date of Birth Social Security Number Address Home Phone MRN    [id number]  [id number] [facility] [place] [facility] [phone] [id number]    Church Marital Status          Non-Sikhism        Admission Date Admission Type Admitting Provider Attending Provider Department, Room/Bed    10/30/20 Emergency Jose Guadalupe Reyes MD  [facility] NEURO HEART, 258/1    Discharge Date Discharge Disposition Discharge Destination        11/1/2020 Home or Self Care              Attending Provider: (none)   Allergies: Aspirin, Bee Venom, Erythromycin, Penicillin G    Isolation: None   Infection: None   Code Status: Prior    Ht: 154.9 cm (61\")   Wt: 68.5 kg (151 lb)    Admission Cmt: None   Principal Problem: None                Active Insurance as of 10/30/2020     Primary Coverage     Payor Plan Insurance Group Employer/Plan Group    [facility] [id number]      Payor Plan Address Payor Plan Phone Number Payor Plan Fax Number Effective Dates    [address]   3/18/2020 - None Entered    [facility] [phone]       Subscriber Name Subscriber Birth Date Member ID       BETH YI 1980 [id number]                 Emergency Contacts      (Rel.) Home Phone Work Phone Mobile Phone    Armando Lopez (Significant Other) [phone] -- --               Discharge Summary      Federico Gracia MD at 11/01/20 2115            Date of Admission: 10/30/2020  258/1    Date of Discharge:  11/1/2020    Length of stay:  LOS: 2 days     Patient was examined with relevant and adequate PPE keeping in mind the current coronavirus pandemic.      Presenting Problem/History of Present Illness   Present on Admission:  • " Cerebrovascular accident (CVA) due to embolism of left middle cerebral artery (CMS/HCC)  • Essential hypertension  • B12 deficiency  • Hyperlipidemia        Hospital Course  Chief complaint:  Chief Complaint   Patient presents with   • Stroke       Sunitha A Louie 40 y.o. female.      40-year-old female presents with right-sided weakness. Patient states she felt fine this morning when she woke up. Around 9:30 AM this morning she started noticing some slurred speech and weakness in her right arm and hand. She states she could not hold anything in her right hand without dropping it. When EMS arrived they found the patient to have a right-sided facial droop as well. She denies any chest pain or shortness of breath. She has had no headache. No alleviating or exacerbating factors.     Patient had TPA and was transferred to ICU.  She has been seen by a pulmonology, cardiology, neurology.  She states she has no residual deficit now      Patient admitted with findings of acute stroke. She received tpa and was tansferred to icu and followed by card, neuro, intensivist and had pt, ot, slp evaluation.. She has no residual deficit and is being released to home w 30 d event monitor.. echo revealed no source o embolism. She claims allergy to asa hence only plavix. .    ROS  Constitutional: Negative for fever.   HENT: Negative for congestion and sore throat.    Eyes: Negative for redness.   Respiratory: Negative for cough and shortness of breath.    Cardiovascular: Negative for chest pain.   Gastrointestinal: Negative for abdominal pain, diarrhea and vomiting.   Genitourinary: Negative for dysuria.   Musculoskeletal: Negative for back pain.   Skin: Negative for rash.   Neurologic resolved for speech difficulty and weakness. Negative for dizziness and headaches.   Psychiatric/Behavioral: Negative for confusion.         Family History   Family history unknown: Yes        Past Medical History:   Diagnosis Date   • Asthma    • COPD  (chronic obstructive pulmonary disease) (CMS/HCC)    • GERD (gastroesophageal reflux disease)    • Hyperlipidemia        History reviewed. No pertinent surgical history.    Social History     Socioeconomic History   • Marital status:      Spouse name: Not on file   • Number of children: Not on file   • Years of education: Not on file   • Highest education level: Not on file   Tobacco Use   • Smoking status: Current Every Day Smoker   Substance and Sexual Activity   • Alcohol use: Not Currently   • Drug use: Not Currently     Comment: Denies any methamphetamine use       Vital Signs  Temp:  [97.6 °F (36.4 °C)-98.1 °F (36.7 °C)] 97.8 °F (36.6 °C)  Heart Rate:  [59-74] 68  Resp:  [16] 16  BP: (140-162)/(72-93) 147/79  Weight change:     Physical Exam:  Physical Exam  Vitals signs and nursing note reviewed.   Constitutional:       General: She is not in acute distress.     Appearance: Normal appearance. She is well-developed. She is not ill-appearing, toxic-appearing or diaphoretic.   HENT:      Head: Normocephalic and atraumatic.      Right Ear: Ear canal and external ear normal.      Left Ear: Ear canal and external ear normal.      Nose: Nose normal. No congestion or rhinorrhea.      Mouth/Throat:      Mouth: Mucous membranes are moist.      Pharynx: No oropharyngeal exudate.      Comments: Multiple broken teeth.  Highly suspicious for methamphetamine use in a 40-year-old.  Eyes:      General: No scleral icterus.        Right eye: No discharge.         Left eye: No discharge.      Extraocular Movements: Extraocular movements intact.      Conjunctiva/sclera: Conjunctivae normal.      Pupils: Pupils are equal, round, and reactive to light.   Neck:      Musculoskeletal: Normal range of motion and neck supple. No neck rigidity or muscular tenderness.      Thyroid: No thyromegaly.      Vascular: No carotid bruit or JVD.      Trachea: No tracheal deviation.   Cardiovascular:      Rate and Rhythm: Normal rate and  regular rhythm.      Pulses: Normal pulses.      Heart sounds: Normal heart sounds. No murmur. No friction rub. No gallop.    Pulmonary:      Effort: Pulmonary effort is normal. No respiratory distress.      Breath sounds: Normal breath sounds. No stridor. No wheezing, rhonchi or rales.   Chest:      Chest wall: No tenderness.   Abdominal:      General: Bowel sounds are normal. There is no distension.      Palpations: Abdomen is soft. There is no mass.      Tenderness: There is no abdominal tenderness. There is no guarding or rebound.      Hernia: No hernia is present.   Musculoskeletal: Normal range of motion.         General: No swelling, tenderness, deformity or signs of injury.      Right lower leg: No edema.      Left lower leg: No edema.   Lymphadenopathy:      Cervical: No cervical adenopathy.   Skin:     General: Skin is warm and dry.      Coloration: Skin is not jaundiced or pale.      Findings: No bruising, erythema or rash.   Neurological:      General: No focal deficit present.      Mental Status: She is alert and oriented to person, place, and time. Mental status is at baseline.      Cranial Nerves: No cranial nerve deficit.      Sensory: No sensory deficit.      Motor: No weakness or abnormal muscle tone.      Coordination: Coordination normal.   Psychiatric:         Mood and Affect: Mood normal.         Behavior: Behavior normal.         Thought Content: Thought content normal.         Judgment: Judgment normal  Vitals signs and nursing note reviewed.   Constitutional:       General: She is not in acute distress.     Appearance: Normal appearance. She is well-developed. She is not ill-appearing, toxic-appearing or diaphoretic.   HENT:      Head: Normocephalic and atraumatic.      Right Ear: Ear canal and external ear normal.      Left Ear: Ear canal and external ear normal.      Nose: Nose normal. No congestion or rhinorrhea.      Mouth/Throat:      Mouth: Mucous membranes are moist.      Pharynx: No  oropharyngeal exudate.      Comments: Multiple broken teeth.  Highly suspicious for methamphetamine use in a 40-year-old.  Eyes:      General: No scleral icterus.        Right eye: No discharge.         Left eye: No discharge.      Extraocular Movements: Extraocular movements intact.      Conjunctiva/sclera: Conjunctivae normal.      Pupils: Pupils are equal, round, and reactive to light.   Neck:      Musculoskeletal: Normal range of motion and neck supple. No neck rigidity or muscular tenderness.      Thyroid: No thyromegaly.      Vascular: No carotid bruit or JVD.      Trachea: No tracheal deviation.   Cardiovascular:      Rate and Rhythm: Normal rate and regular rhythm.      Pulses: Normal pulses.      Heart sounds: Normal heart sounds. No murmur. No friction rub. No gallop.    Pulmonary:      Effort: Pulmonary effort is normal. No respiratory distress.      Breath sounds: Normal breath sounds. No stridor. No wheezing, rhonchi or rales.   Chest:      Chest wall: No tenderness.   Abdominal:      General: Bowel sounds are normal. There is no distension.      Palpations: Abdomen is soft. There is no mass.      Tenderness: There is no abdominal tenderness. There is no guarding or rebound.      Hernia: No hernia is present.   Musculoskeletal: Normal range of motion.         General: No swelling, tenderness, deformity or signs of injury.      Right lower leg: No edema.      Left lower leg: No edema.   Lymphadenopathy:      Cervical: No cervical adenopathy.   Skin:     General: Skin is warm and dry.      Coloration: Skin is not jaundiced or pale.      Findings: No bruising, erythema or rash.   Neurological:      General: No focal deficit present.      Mental Status: She is alert and oriented to person, place, and time. Mental status is at baseline.      Cranial Nerves: No cranial nerve deficit.      Sensory: No sensory deficit.      Motor: No weakness or abnormal muscle tone.      Coordination: Coordination normal.    Psychiatric:         Mood and Affect: Mood normal.         Behavior: Behavior normal.         Thought Content: Thought content normal.         Judgment: Judgment normal            Discharge Diagnosis:     Active Hospital Problems    Diagnosis  POA   • Essential hypertension [I10]  Yes   • B12 deficiency [E53.8]  Yes   • Hyperlipidemia [E78.5]  Yes   • Cerebrovascular accident (CVA) due to embolism of left middle cerebral artery (CMS/HCC) [I63.412]  Yes      Resolved Hospital Problems   No resolved problems to display.       Estimated Creatinine Clearance: 135.2 mL/min (A) (by C-G formula based on SCr of 0.48 mg/dL (L)).    Discharge Disposition    Continued Care and Services - Discharged on 11/1/2020 Admission date: 10/30/2020 - Discharge disposition: Home or Self Care   Coordination has not been started for this encounter.             PT Recommendation and Plan          Home or Self Care           Discharge Medications      New Medications      Instructions Start Date   amLODIPine 5 MG tablet  Commonly known as: NORVASC   5 mg, Oral, Every 24 Hours Scheduled   Start Date: November 2, 2020     atorvastatin 80 MG tablet  Commonly known as: LIPITOR   80 mg, Oral, Nightly      clopidogrel 75 MG tablet  Commonly known as: PLAVIX   75 mg, Oral, Daily   Start Date: November 2, 2020     cyanocobalamin 1000 MCG tablet  Commonly known as: VITAMIN B-12   1,000 mcg, Oral, Daily   Start Date: November 2, 2020     lisinopril 20 MG tablet  Commonly known as: PRINIVIL,ZESTRIL   20 mg, Oral, Every 24 Hours Scheduled   Start Date: November 2, 2020        Continue These Medications      Instructions Start Date   albuterol sulfate  (90 Base) MCG/ACT inhaler  Commonly known as: PROVENTIL HFA;VENTOLIN HFA;PROAIR HFA   2 puffs, Inhalation, Every 4 Hours PRN      budesonide-formoterol 160-4.5 MCG/ACT inhaler  Commonly known as: SYMBICORT   2 puffs, Inhalation, 2 Times Daily - RT      metoclopramide 10 MG tablet  Commonly known  as: REGLAN   10 mg, Oral, 2 times daily      omeprazole 40 MG capsule  Commonly known as: priLOSEC   40 mg, Oral, Daily           Discharge medications personally reviewed by me and med rec done by me personally.  11/01/20, 10:09 PM EST        Consults:   Consults     Date and Time Order Name Status Description    10/31/2020 0843 Inpatient Hospitalist Consult Completed     10/30/2020 1502 Inpatient Cardiology Consult      10/30/2020 1128 Inpatient Neurology Consult Stroke Completed     10/30/2020 1128 Inpatient Neurology Consult Stroke Completed           Procedures Performed:         Pertinent Test Results:   Results from last 7 days   Lab Units 11/01/20  0410 10/31/20  0425 10/30/20  1143   WBC 10*3/mm3 8.30 7.10 11.00*   HEMOGLOBIN g/dL 12.1 11.5* 14.1   HEMATOCRIT % 38.4 35.5 43.6   MCV fL 90.6 89.8 89.4   MCH pg 28.6 29.1 28.9   PLATELETS 10*3/mm3 269 243 315     Results from last 7 days   Lab Units 11/01/20 0410 10/31/20  0425 10/30/20  1143   SODIUM mmol/L 142 144 145   POTASSIUM mmol/L 4.0 4.1 3.0*   CHLORIDE mmol/L 106 108* 106   CO2 mmol/L 27.0 28.0 27.0   BUN mg/dL 7 5* 3*   CREATININE mg/dL 0.48* 0.44* 0.50*   CALCIUM mg/dL 9.7 9.6 10.1   MAGNESIUM mg/dL 2.4 2.5  --    BILIRUBIN mg/dL 0.2 <0.2 0.2   ALK PHOS U/L 97 92 108   ALT (SGPT) U/L 24 21 28   AST (SGOT) U/L 18 16 26   GLUCOSE mg/dL 98 102* 94     Lab Results   Component Value Date    CALCIUM 9.7 11/01/2020    PHOS 3.4 11/01/2020     No results found for: HGBA1C  Lab Results   Component Value Date    CHOL 175 10/31/2020    TRIG 189 (H) 10/31/2020    HDL 37 (L) 10/31/2020     (H) 10/31/2020     No results found for: LIPASE        No results found for: INTRAOP, PREDX, FINALDX, COMDX  Inflammatory Biomarkers        Invalid input(s): ESR, D-DIMER QUANTITATIVE,  PROCALCITONIN  COVID19   Date Value Ref Range Status   10/30/2020 Not Detected Not Detected - Ref. Range Final        Microbiology Results (last 10 days)     Procedure Component Value -  Date/Time    COVID PRE-OP / PRE-PROCEDURE SCREENING ORDER (NO ISOLATION) - Swab, Nasopharynx [[id number]]  (Normal) Collected: 10/30/20 1435    Lab Status: Final result Specimen: Swab from Nasopharynx Updated: 10/30/20 1641    Narrative:      The following orders were created for panel order COVID PRE-OP / PRE-PROCEDURE SCREENING ORDER (NO ISOLATION) - Swab, Nasopharynx.  Procedure                               Abnormality         Status                     ---------                               -----------         ------                     Respiratory Panel PCR w/...[[id number]]  Normal              Final result                 Please view results for these tests on the individual orders.    Respiratory Panel PCR w/COVID-19(SARS-CoV-2) DANNY/MAXIMUS/COLETTE/PAD/COR/MAD/SHARRI In-House, NP Swab in UTM/VTM, 3-4 HR TAT - Swab, Nasopharynx [[id number]]  (Normal) Collected: 10/30/20 1435    Lab Status: Final result Specimen: Swab from Nasopharynx Updated: 10/30/20 1641     ADENOVIRUS, PCR Not Detected     Coronavirus 229E Not Detected     Coronavirus HKU1 Not Detected     Coronavirus NL63 Not Detected     Coronavirus OC43 Not Detected     COVID19 Not Detected     Human Metapneumovirus Not Detected     Human Rhinovirus/Enterovirus Not Detected     Influenza A PCR Not Detected     Influenza A H1 Not Detected     Influenza A H1 2009 PCR Not Detected     Influenza A H3 Not Detected     Influenza B PCR Not Detected     Parainfluenza Virus 1 Not Detected     Parainfluenza Virus 2 Not Detected     Parainfluenza Virus 3 Not Detected     Parainfluenza Virus 4 Not Detected     RSV, PCR Not Detected     Bordetella pertussis pcr Not Detected     Bordetella parapertussis PCR Not Detected     Chlamydophila pneumoniae PCR Not Detected     Mycoplasma pneumo by PCR Not Detected    Narrative:      Fact sheet for providers: https://docs.[employer]/wp-content/uploads/GBJ0529-9738-XX7.1-EUA-Provider-Fact-Sheet-3.pdf    Fact sheet for patients:  https://docs.[employer]/wp-content/uploads/SID0664-7933-YZ1.1-EUA-Patient-Fact-Sheet-1.pdf          ECG/EMG Results (most recent)     Procedure Component Value Units Date/Time    Adult Transthoracic Echo Complete W/ Cont if Necessary Per Protocol [[id number]] Collected: 10/30/20 1745     Updated: 11/01/20 1007     BSA 1.7 m^2      RVIDd 1.8 cm      IVSd 1.4 cm      LVIDd 3.7 cm      LVIDs 2.5 cm      LVPWd 1.0 cm      IVS/LVPW 1.3     FS 32.9 %      EDV(Teich) 57.2 ml      ESV(Teich) 21.6 ml      EF(Teich) 62.2 %      EDV(cubed) 49.7 ml      ESV(cubed) 15.0 ml      EF(cubed) 69.7 %      LV mass(C)d 148.0 grams      LV mass(C)dI 88.3 grams/m^2      SV(Teich) 35.6 ml      SI(Teich) 21.2 ml/m^2      SV(cubed) 34.6 ml      SI(cubed) 20.7 ml/m^2      Ao root diam 2.5 cm      Ao root area 4.9 cm^2      ACS 1.7 cm      asc Aorta Diam 2.4 cm      LVOT diam 1.9 cm      LVOT area 2.8 cm^2      EDV(MOD-sp4) 58.5 ml      ESV(MOD-sp4) 20.8 ml      EF(MOD-sp4) 64.5 %      SV(MOD-sp4) 37.7 ml      SI(MOD-sp4) 22.5 ml/m^2      Ao root area (BSA corrected) 1.5     LV Dukes Vol (BSA corrected) 34.9 ml/m^2      LV Sys Vol (BSA corrected) 12.4 ml/m^2      Aortic R-R 0.92 sec      Aortic HR 65.1 BPM      MV E max nicolás 72.8 cm/sec      MV A max nicolás 54.0 cm/sec      MV E/A 1.3     MV V2 max 103.5 cm/sec      MV max PG 4.3 mmHg      MV V2 mean 62.3 cm/sec      MV mean PG 1.8 mmHg      MV V2 VTI 23.0 cm      MVA(VTI) 2.2 cm^2      MV dec slope 430.6 cm/sec^2      MV dec time 0.17 sec      Ao pk nicolás 136.5 cm/sec      Ao max PG 7.5 mmHg      Ao max PG (full) 4.1 mmHg      Ao V2 mean 88.6 cm/sec      Ao mean PG 3.8 mmHg      Ao mean PG (full) 2.3 mmHg      Ao V2 VTI 26.0 cm      CHASITY(I,A) 1.9 cm^2      CHASITY(I,D) 1.9 cm^2      CHASITY(V,A) 1.9 cm^2      CHASITY(V,D) 1.9 cm^2      LV V1 max PG 3.4 mmHg      LV V1 mean PG 1.5 mmHg      LV V1 max 91.8 cm/sec      LV V1 mean 55.7 cm/sec      LV V1 VTI 17.6 cm      CO(Ao) 8.3 l/min      CI(Ao) 4.9 l/min/m^2       SV(Ao) 127.0 ml      SI(Ao) 75.8 ml/m^2      CO(LVOT) 3.2 l/min      CI(LVOT) 1.9 l/min/m^2      SV(LVOT) 49.5 ml      SI(LVOT) 29.5 ml/m^2      PA V2 max 111.3 cm/sec      PA max PG 5.0 mmHg      PA max PG (full) 1.6 mmHg      PA V2 mean 72.7 cm/sec      PA mean PG 2.6 mmHg      PA mean PG (full) 1.1 mmHg      PA V2 VTI 24.1 cm      PA acc time 0.12 sec      RV V1 max PG 3.4 mmHg      RV V1 mean PG 1.5 mmHg      RV V1 max 92.0 cm/sec      RV V1 mean 56.2 cm/sec      RV V1 VTI 20.3 cm      TR max ladarius 191.5 cm/sec      RVSP(TR) 17.7 mmHg      RAP systole 3.0 mmHg      PA pr(Accel) 25.9 mmHg      Pulm Sys Ladarius 52.1 cm/sec      Pulm Dukes Ladarius 38.2 cm/sec      Pulm S/D 1.4     Pulm A Revs Dur 0.12 sec      Pulm A Revs Ladarius 31.4 cm/sec       CV ECHO SHAKIRA - BZI_BMI 28.5 kilograms/m^2       CV ECHO SHAKIRA - BSA(HAYCOCK) 1.7 m^2       CV ECHO SHAKIRA - BZI_METRIC_WEIGHT 68.5 kg       CV ECHO SHAKIRA - BZI_METRIC_HEIGHT 154.9 cm      EF(MOD-bp) 65.0 %      LA dimension(2D) 3.2 cm     Narrative:      · Estimated left ventricular EF was in agreement with the calculated left   ventricular EF. Left ventricular ejection fraction appears to be 61 - 65%.   Left ventricular systolic function is normal.  · Trace to mild mitral valve regurgitation is present.  · Negative agitated saline contrast bubble study for ASD/VSD shunting  · No cardioembolic etiology identified by echo exam       ECG 12 Lead [[id number]] Collected: 10/30/20 1158     Updated: 11/01/20 1056     QT Interval 445 ms     Narrative:      HEART RATE= 77  bpm  RR Interval= 776  ms  MO Interval= 286  ms  P Horizontal Axis= 8  deg  P Front Axis= 91  deg  QRSD Interval= 158  ms  QT Interval= 445  ms  QRS Axis= -49  deg  T Wave Axis= 128  deg  - ABNORMAL ECG -  Sinus rhythm  Prolonged MO interval  When compared with ECG of 23-Jan-2018 13:44:25,  New conduction abnormality  Electronically Signed By: Haja Mills (Calin) 01-Nov-2020 10:55:26  Date and Time of Study: 2020-10-30  11:58:51               Results for orders placed during the hospital encounter of 10/30/20   Adult Transthoracic Echo Complete W/ Cont if Necessary Per Protocol    Narrative · Estimated left ventricular EF was in agreement with the calculated left   ventricular EF. Left ventricular ejection fraction appears to be 61 - 65%.   Left ventricular systolic function is normal.  · Trace to mild mitral valve regurgitation is present.  · Negative agitated saline contrast bubble study for ASD/VSD shunting  · No cardioembolic etiology identified by echo exam          Ct Angiogram Head    Result Date: 10/30/2020  Major arterial vasculature within head and neck appears widely patent, with no hemodynamically significant stenosis, dissection, thrombus, or aneurysm.  Electronically Signed By-DR. Sharan Ball MD On:10/30/2020 1:03 PM This report was finalized on [id number] by DR. Sharan Ball MD.    Ct Head Without Contrast    Result Date: 10/31/2020  1.No CT findings of acute hemorrhage at this time. 2.Small left frontal infarcts seen on MRI are not clearly visible on this exam.  Electronically Signed By-DR. Joesph Luciano MD On:10/31/2020 1:11 PM This report was finalized on [id number] by DR. Joesph Luciano MD.    Ct Angiogram Neck    Result Date: 10/30/2020  Major arterial vasculature within head and neck appears widely patent, with no hemodynamically significant stenosis, dissection, thrombus, or aneurysm.  Electronically Signed By-DR. Sharan Ball MD On:10/30/2020 1:03 PM This report was finalized on [id number] by DR. Sharan Ball MD.    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 No hemorrhagic transformation or significant mass effect. 2.Findings suggestive of minimal chronic small vessel ischemic disease. 3.Moderate mucous retention cyst within left frontal sinus.  Electronically Signed By-DR. Sharan Ball MD On:10/30/2020  2:28 PM This report was finalized on [id number] by DR. Sharan Ball MD.    Xr Chest 1 View    Result Date: 10/30/2020  Patchy left lower lobe airspace disease suggestive of pneumonia.  Electronically Signed By-Anthony Durbin On:10/30/2020 12:20 PM This report was finalized on [id number] by  Anthony Durbin, .    Ct Head Without Contrast Stroke Protocol    Result Date: 10/30/2020  Paranasal sinus disease. No acute intracranial findings.  Electronically Signed By-Dr. January Patel MD On:10/30/2020 11:41 AM This report was finalized on [id number] by Dr. January Patel MD.    Ct Cerebral Perfusion With & Without Contrast    Result Date: 10/30/2020  Normal cerebral perfusion.  Electronically Signed By-DR. Sharan Ball MD On:10/30/2020 12:03 PM This report was finalized on [id number] by DR. Sharan Ball MD.      Xrays, labs reviewed personally by me.  11/01/20  10:09 PM EST      Condition on Discharge:    Stable    Discharge Diet:   Dietary Orders (From admission, onward)     Start     Ordered    10/30/20 1514  Diet Cardiac; Healthy Heart  Diet Effective Now     Question Answer Comment   Diet / Texture / Consistency Cardiac    Select Type: Healthy Heart        10/30/20 1513                Activity at Discharge:   Activity Instructions     Activity as Tolerated            Follow-up Appointments    No future appointments.    Additional Instructions for the Follow-ups that You Need to Schedule     Discharge Follow-up with PCP   As directed       Currently Documented PCP:    Griffin Lynn MD    PCP Phone Number:    [phone]     Follow Up Details: If no PCP, call MD finder at [phone]           Follow-up Information     SANTOSH Bedolla MD Follow up in 1 month(s).    Specialty: Cardiology  Contact information:  [address] [place] [address] [phone]             Seipel, Joseph F, MD Follow up in 1 month(s).    Specialties: Sleep Medicine, Neurology  Contact information:  [address]  [facility] [address]  [place] [address]  [phone]             Griffin Lynn MD .    Specialty: Family Medicine  Why: If no PCP, call MD finder at [phone]  Contact information:  [address] [facility] [place] [address] [phone]                    Test Results Pending at Discharge  Pending Labs     Order Current Status    Hemoglobin A1c In process           Risk for Readmission (LACE) Score: 6 (11/1/2020  6:00 AM)        Time: I spent  more than  40  minutes on this discharge activity which included: face-to-face encounter with the patient, reviewing the data in the system, coordination of the care with the nursing staff as well as consultants, documentation, and entering orders.   Care coordination with nursing regarding dc planning.         Federico Gracia MD  11/01/20  22:09 EST            Electronically signed by Federico Gracia MD at 11/01/20 [id number]

## 2020-11-30 LAB — TOAL ENROLLMENT DAYS: 21

## 2020-11-30 PROCEDURE — 93272 ECG/REVIEW INTERPRET ONLY: CPT | Performed by: INTERNAL MEDICINE

## 2022-08-01 ENCOUNTER — HOSPITAL ENCOUNTER (OUTPATIENT)
Facility: HOSPITAL | Age: 42
Setting detail: OBSERVATION
Discharge: HOME | End: 2022-08-02
Attending: EMERGENCY MEDICINE | Admitting: INTERNAL MEDICINE

## 2022-08-01 ENCOUNTER — APPOINTMENT (OUTPATIENT)
Dept: CT IMAGING | Facility: HOSPITAL | Age: 42
End: 2022-08-01

## 2022-08-01 DIAGNOSIS — R11.2 NAUSEA AND VOMITING, UNSPECIFIED VOMITING TYPE: ICD-10-CM

## 2022-08-01 DIAGNOSIS — U07.1 COVID: Primary | ICD-10-CM

## 2022-08-01 DIAGNOSIS — E83.41 HYPERMAGNESEMIA: ICD-10-CM

## 2022-08-01 DIAGNOSIS — R10.9 ABDOMINAL PAIN, UNSPECIFIED ABDOMINAL LOCATION: ICD-10-CM

## 2022-08-01 DIAGNOSIS — E87.6 HYPOKALEMIA: ICD-10-CM

## 2022-08-01 LAB
ALBUMIN SERPL-MCNC: 4.2 G/DL (ref 3.5–5.2)
ALBUMIN/GLOB SERPL: 1.4 G/DL
ALP SERPL-CCNC: 92 U/L (ref 39–117)
ALT SERPL W P-5'-P-CCNC: 35 U/L (ref 1–33)
AMPHET+METHAMPHET UR QL: NEGATIVE
ANION GAP SERPL CALCULATED.3IONS-SCNC: 14 MMOL/L (ref 5–15)
AST SERPL-CCNC: 47 U/L (ref 1–32)
BACTERIA UR QL AUTO: ABNORMAL /HPF
BACTERIA UR QL AUTO: ABNORMAL /HPF
BARBITURATES UR QL SCN: NEGATIVE
BASOPHILS # BLD AUTO: 0.1 10*3/MM3 (ref 0–0.2)
BASOPHILS NFR BLD AUTO: 1.5 % (ref 0–1.5)
BENZODIAZ UR QL SCN: NEGATIVE
BILIRUB SERPL-MCNC: 0.5 MG/DL (ref 0–1.2)
BILIRUB UR QL STRIP: ABNORMAL
BILIRUB UR QL STRIP: NEGATIVE
BUN SERPL-MCNC: 8 MG/DL (ref 6–20)
BUN/CREAT SERPL: 15.1 (ref 7–25)
CALCIUM SPEC-SCNC: 10.2 MG/DL (ref 8.6–10.5)
CANNABINOIDS SERPL QL: POSITIVE
CHLORIDE SERPL-SCNC: 103 MMOL/L (ref 98–107)
CLARITY UR: CLEAR
CLARITY UR: CLEAR
CO2 SERPL-SCNC: 28 MMOL/L (ref 22–29)
COCAINE UR QL: NEGATIVE
COLOR UR: ABNORMAL
COLOR UR: YELLOW
CREAT SERPL-MCNC: 0.53 MG/DL (ref 0.57–1)
DEPRECATED RDW RBC AUTO: 43.8 FL (ref 37–54)
EGFRCR SERPLBLD CKD-EPI 2021: 117.9 ML/MIN/1.73
EOSINOPHIL # BLD AUTO: 0 10*3/MM3 (ref 0–0.4)
EOSINOPHIL NFR BLD AUTO: 0.3 % (ref 0.3–6.2)
ERYTHROCYTE [DISTWIDTH] IN BLOOD BY AUTOMATED COUNT: 14.9 % (ref 12.3–15.4)
FLUAV SUBTYP SPEC NAA+PROBE: NOT DETECTED
FLUBV RNA ISLT QL NAA+PROBE: NOT DETECTED
GLOBULIN UR ELPH-MCNC: 2.9 GM/DL
GLUCOSE SERPL-MCNC: 91 MG/DL (ref 65–99)
GLUCOSE UR STRIP-MCNC: NEGATIVE MG/DL
GLUCOSE UR STRIP-MCNC: NEGATIVE MG/DL
HCT VFR BLD AUTO: 38.9 % (ref 34–46.6)
HGB BLD-MCNC: 12.9 G/DL (ref 12–15.9)
HGB UR QL STRIP.AUTO: NEGATIVE
HGB UR QL STRIP.AUTO: NEGATIVE
HYALINE CASTS UR QL AUTO: ABNORMAL /LPF
HYALINE CASTS UR QL AUTO: ABNORMAL /LPF
KETONES UR QL STRIP: ABNORMAL
KETONES UR QL STRIP: ABNORMAL
LEUKOCYTE ESTERASE UR QL STRIP.AUTO: ABNORMAL
LEUKOCYTE ESTERASE UR QL STRIP.AUTO: ABNORMAL
LIPASE SERPL-CCNC: 22 U/L (ref 13–60)
LYMPHOCYTES # BLD AUTO: 1.8 10*3/MM3 (ref 0.7–3.1)
LYMPHOCYTES NFR BLD AUTO: 29.5 % (ref 19.6–45.3)
MAGNESIUM SERPL-MCNC: 2.8 MG/DL (ref 1.6–2.6)
MCH RBC QN AUTO: 28.1 PG (ref 26.6–33)
MCHC RBC AUTO-ENTMCNC: 33.1 G/DL (ref 31.5–35.7)
MCV RBC AUTO: 85 FL (ref 79–97)
METHADONE UR QL SCN: NEGATIVE
MONOCYTES # BLD AUTO: 0.8 10*3/MM3 (ref 0.1–0.9)
MONOCYTES NFR BLD AUTO: 12.8 % (ref 5–12)
MUCOUS THREADS URNS QL MICRO: ABNORMAL /HPF
NEUTROPHILS NFR BLD AUTO: 3.5 10*3/MM3 (ref 1.7–7)
NEUTROPHILS NFR BLD AUTO: 55.9 % (ref 42.7–76)
NITRITE UR QL STRIP: NEGATIVE
NITRITE UR QL STRIP: NEGATIVE
NRBC BLD AUTO-RTO: 0 /100 WBC (ref 0–0.2)
OPIATES UR QL: NEGATIVE
OXYCODONE UR QL SCN: NEGATIVE
PH UR STRIP.AUTO: 6.5 [PH] (ref 5–8)
PH UR STRIP.AUTO: 7 [PH] (ref 5–8)
PLATELET # BLD AUTO: 202 10*3/MM3 (ref 140–450)
PMV BLD AUTO: 9.1 FL (ref 6–12)
POTASSIUM SERPL-SCNC: 2.6 MMOL/L (ref 3.5–5.2)
PROT SERPL-MCNC: 7.1 G/DL (ref 6–8.5)
PROT UR QL STRIP: ABNORMAL
PROT UR QL STRIP: ABNORMAL
RBC # BLD AUTO: 4.58 10*6/MM3 (ref 3.77–5.28)
RBC # UR STRIP: ABNORMAL /HPF
RBC # UR STRIP: ABNORMAL /HPF
REF LAB TEST METHOD: ABNORMAL
REF LAB TEST METHOD: ABNORMAL
SARS-COV-2 RNA PNL SPEC NAA+PROBE: DETECTED
SODIUM SERPL-SCNC: 145 MMOL/L (ref 136–145)
SP GR UR STRIP: 1.02 (ref 1–1.03)
SP GR UR STRIP: 1.02 (ref 1–1.03)
SQUAMOUS #/AREA URNS HPF: ABNORMAL /HPF
SQUAMOUS #/AREA URNS HPF: ABNORMAL /HPF
UROBILINOGEN UR QL STRIP: ABNORMAL
UROBILINOGEN UR QL STRIP: ABNORMAL
WBC # UR STRIP: ABNORMAL /HPF
WBC # UR STRIP: ABNORMAL /HPF
WBC NRBC COR # BLD: 6.2 10*3/MM3 (ref 3.4–10.8)

## 2022-08-01 PROCEDURE — 0 IOPAMIDOL PER 1 ML: Performed by: EMERGENCY MEDICINE

## 2022-08-01 PROCEDURE — P9612 CATHETERIZE FOR URINE SPEC: HCPCS

## 2022-08-01 PROCEDURE — 83735 ASSAY OF MAGNESIUM: CPT | Performed by: PHYSICIAN ASSISTANT

## 2022-08-01 PROCEDURE — 25010000002 DIPHENHYDRAMINE PER 50 MG: Performed by: PHYSICIAN ASSISTANT

## 2022-08-01 PROCEDURE — 80307 DRUG TEST PRSMV CHEM ANLYZR: CPT | Performed by: PHYSICIAN ASSISTANT

## 2022-08-01 PROCEDURE — 96365 THER/PROPH/DIAG IV INF INIT: CPT

## 2022-08-01 PROCEDURE — 87635 SARS-COV-2 COVID-19 AMP PRB: CPT | Performed by: PHYSICIAN ASSISTANT

## 2022-08-01 PROCEDURE — 81001 URINALYSIS AUTO W/SCOPE: CPT | Performed by: EMERGENCY MEDICINE

## 2022-08-01 PROCEDURE — C9803 HOPD COVID-19 SPEC COLLECT: HCPCS

## 2022-08-01 PROCEDURE — G0378 HOSPITAL OBSERVATION PER HR: HCPCS

## 2022-08-01 PROCEDURE — 87502 INFLUENZA DNA AMP PROBE: CPT | Performed by: PHYSICIAN ASSISTANT

## 2022-08-01 PROCEDURE — 74177 CT ABD & PELVIS W/CONTRAST: CPT

## 2022-08-01 PROCEDURE — 81001 URINALYSIS AUTO W/SCOPE: CPT | Performed by: PHYSICIAN ASSISTANT

## 2022-08-01 PROCEDURE — 96366 THER/PROPH/DIAG IV INF ADDON: CPT

## 2022-08-01 PROCEDURE — 99284 EMERGENCY DEPT VISIT MOD MDM: CPT

## 2022-08-01 PROCEDURE — 96375 TX/PRO/DX INJ NEW DRUG ADDON: CPT

## 2022-08-01 PROCEDURE — 25010000002 PROCHLORPERAZINE 10 MG/2ML SOLUTION: Performed by: PHYSICIAN ASSISTANT

## 2022-08-01 PROCEDURE — 25010000002 SODIUM CHLORIDE 0.9 % WITH KCL 20 MEQ 20-0.9 MEQ/L-% SOLUTION: Performed by: NURSE PRACTITIONER

## 2022-08-01 PROCEDURE — 83690 ASSAY OF LIPASE: CPT | Performed by: PHYSICIAN ASSISTANT

## 2022-08-01 PROCEDURE — 25010000002 ENOXAPARIN PER 10 MG: Performed by: NURSE PRACTITIONER

## 2022-08-01 PROCEDURE — 99220 PR INITIAL OBSERVATION CARE/DAY 70 MINUTES: CPT | Performed by: NURSE PRACTITIONER

## 2022-08-01 PROCEDURE — 85025 COMPLETE CBC W/AUTO DIFF WBC: CPT | Performed by: PHYSICIAN ASSISTANT

## 2022-08-01 PROCEDURE — 0 POTASSIUM CHLORIDE 10 MEQ/100ML SOLUTION: Performed by: PHYSICIAN ASSISTANT

## 2022-08-01 PROCEDURE — 80053 COMPREHEN METABOLIC PANEL: CPT | Performed by: PHYSICIAN ASSISTANT

## 2022-08-01 RX ORDER — POTASSIUM CHLORIDE 1.5 G/1.77G
40 POWDER, FOR SOLUTION ORAL AS NEEDED
Status: DISCONTINUED | OUTPATIENT
Start: 2022-08-01 | End: 2022-08-02 | Stop reason: HOSPADM

## 2022-08-01 RX ORDER — SODIUM CHLORIDE 0.9 % (FLUSH) 0.9 %
10 SYRINGE (ML) INJECTION EVERY 12 HOURS SCHEDULED
Status: DISCONTINUED | OUTPATIENT
Start: 2022-08-01 | End: 2022-08-02 | Stop reason: HOSPADM

## 2022-08-01 RX ORDER — CLOPIDOGREL BISULFATE 75 MG/1
75 TABLET ORAL DAILY
Status: DISCONTINUED | OUTPATIENT
Start: 2022-08-02 | End: 2022-08-02 | Stop reason: HOSPADM

## 2022-08-01 RX ORDER — ENOXAPARIN SODIUM 100 MG/ML
40 INJECTION SUBCUTANEOUS DAILY
Status: DISCONTINUED | OUTPATIENT
Start: 2022-08-01 | End: 2022-08-02 | Stop reason: HOSPADM

## 2022-08-01 RX ORDER — ACETAMINOPHEN 325 MG/1
650 TABLET ORAL EVERY 4 HOURS PRN
Status: DISCONTINUED | OUTPATIENT
Start: 2022-08-01 | End: 2022-08-02 | Stop reason: HOSPADM

## 2022-08-01 RX ORDER — ACETAMINOPHEN 650 MG/1
650 SUPPOSITORY RECTAL EVERY 4 HOURS PRN
Status: DISCONTINUED | OUTPATIENT
Start: 2022-08-01 | End: 2022-08-02 | Stop reason: HOSPADM

## 2022-08-01 RX ORDER — DIPHENHYDRAMINE HYDROCHLORIDE 50 MG/ML
25 INJECTION INTRAMUSCULAR; INTRAVENOUS ONCE
Status: COMPLETED | OUTPATIENT
Start: 2022-08-01 | End: 2022-08-01

## 2022-08-01 RX ORDER — PROCHLORPERAZINE EDISYLATE 5 MG/ML
10 INJECTION INTRAMUSCULAR; INTRAVENOUS ONCE
Status: COMPLETED | OUTPATIENT
Start: 2022-08-01 | End: 2022-08-01

## 2022-08-01 RX ORDER — POTASSIUM CHLORIDE 20 MEQ/1
40 TABLET, EXTENDED RELEASE ORAL AS NEEDED
Status: DISCONTINUED | OUTPATIENT
Start: 2022-08-01 | End: 2022-08-02 | Stop reason: HOSPADM

## 2022-08-01 RX ORDER — AMLODIPINE BESYLATE 5 MG/1
5 TABLET ORAL
Status: DISCONTINUED | OUTPATIENT
Start: 2022-08-02 | End: 2022-08-02 | Stop reason: HOSPADM

## 2022-08-01 RX ORDER — ONDANSETRON 4 MG/1
4 TABLET, FILM COATED ORAL EVERY 6 HOURS PRN
Status: DISCONTINUED | OUTPATIENT
Start: 2022-08-01 | End: 2022-08-02 | Stop reason: HOSPADM

## 2022-08-01 RX ORDER — PANTOPRAZOLE SODIUM 40 MG/1
40 TABLET, DELAYED RELEASE ORAL EVERY MORNING
Status: DISCONTINUED | OUTPATIENT
Start: 2022-08-02 | End: 2022-08-02 | Stop reason: HOSPADM

## 2022-08-01 RX ORDER — ACETAMINOPHEN 160 MG/5ML
650 SOLUTION ORAL EVERY 4 HOURS PRN
Status: DISCONTINUED | OUTPATIENT
Start: 2022-08-01 | End: 2022-08-02 | Stop reason: HOSPADM

## 2022-08-01 RX ORDER — SODIUM CHLORIDE AND POTASSIUM CHLORIDE 150; 900 MG/100ML; MG/100ML
250 INJECTION, SOLUTION INTRAVENOUS CONTINUOUS
Status: DISPENSED | OUTPATIENT
Start: 2022-08-01 | End: 2022-08-02

## 2022-08-01 RX ORDER — SODIUM CHLORIDE 9 MG/ML
100 INJECTION, SOLUTION INTRAVENOUS CONTINUOUS
Status: DISCONTINUED | OUTPATIENT
Start: 2022-08-01 | End: 2022-08-02 | Stop reason: HOSPADM

## 2022-08-01 RX ORDER — POTASSIUM CHLORIDE 7.45 MG/ML
10 INJECTION INTRAVENOUS ONCE
Status: COMPLETED | OUTPATIENT
Start: 2022-08-01 | End: 2022-08-01

## 2022-08-01 RX ORDER — ONDANSETRON 2 MG/ML
4 INJECTION INTRAMUSCULAR; INTRAVENOUS EVERY 6 HOURS PRN
Status: DISCONTINUED | OUTPATIENT
Start: 2022-08-01 | End: 2022-08-02 | Stop reason: HOSPADM

## 2022-08-01 RX ORDER — SODIUM CHLORIDE 0.9 % (FLUSH) 0.9 %
10 SYRINGE (ML) INJECTION AS NEEDED
Status: DISCONTINUED | OUTPATIENT
Start: 2022-08-01 | End: 2022-08-02 | Stop reason: HOSPADM

## 2022-08-01 RX ADMIN — SODIUM CHLORIDE, POTASSIUM CHLORIDE, SODIUM LACTATE AND CALCIUM CHLORIDE 1000 ML: 600; 310; 30; 20 INJECTION, SOLUTION INTRAVENOUS at 12:55

## 2022-08-01 RX ADMIN — POTASSIUM CHLORIDE AND SODIUM CHLORIDE 250 ML/HR: 900; 150 INJECTION, SOLUTION INTRAVENOUS at 18:25

## 2022-08-01 RX ADMIN — POTASSIUM CHLORIDE AND SODIUM CHLORIDE 250 ML/HR: 900; 150 INJECTION, SOLUTION INTRAVENOUS at 21:49

## 2022-08-01 RX ADMIN — POTASSIUM CHLORIDE 10 MEQ: 7.46 INJECTION, SOLUTION INTRAVENOUS at 15:02

## 2022-08-01 RX ADMIN — ENOXAPARIN SODIUM 40 MG: 100 INJECTION SUBCUTANEOUS at 18:54

## 2022-08-01 RX ADMIN — PROCHLORPERAZINE EDISYLATE 10 MG: 5 INJECTION INTRAMUSCULAR; INTRAVENOUS at 12:55

## 2022-08-01 RX ADMIN — DIPHENHYDRAMINE HYDROCHLORIDE 25 MG: 50 INJECTION, SOLUTION INTRAMUSCULAR; INTRAVENOUS at 12:55

## 2022-08-01 RX ADMIN — Medication 10 ML: at 21:49

## 2022-08-01 RX ADMIN — IOPAMIDOL 100 ML: 755 INJECTION, SOLUTION INTRAVENOUS at 15:00

## 2022-08-01 RX ADMIN — POTASSIUM CHLORIDE 40 MEQ: 1500 TABLET, EXTENDED RELEASE ORAL at 18:54

## 2022-08-01 NOTE — ED PROVIDER NOTES
Subjective   Chief Complaint: Abdominal pain, nausea vomiting    Patient is a 41-year-old .  History of asthma, COPD, GERD presents to the ER complaints of abdominal pain, nausea vomiting for 5 days.  Patient states that she has not been able to eat or drink anything for the last 5 days due to vomiting.  She reports pain in her lower abdomen, most on the left side that she describes as an ache and rates a 9/10.  She reports constant nausea, multiple episodes of vomiting, four episodes today.  No diarrhea.  She denies any hematemesis, hematochezia or melena.  No dysuria.  No chest pain shortness of breath headache or fever.  She has subjective chills.  Abdominal surgical history significant for partial hysterectomy.    Location: Abdomen    Quality: Aching    Duration: 5 days    Timing: Constant    Severity: Moderate    Associated Symptoms: Nausea vomiting chills    PCP: Griffin Lynn      History provided by:  Patient      Review of Systems   Constitutional: Positive for chills. Negative for fever.   HENT: Negative for sore throat and trouble swallowing.    Eyes: Negative.    Respiratory: Negative for shortness of breath and wheezing.    Cardiovascular: Negative for chest pain.   Gastrointestinal: Positive for abdominal pain, nausea and vomiting.   Endocrine: Negative.    Genitourinary: Negative for dysuria.   Musculoskeletal: Negative for myalgias.   Skin: Negative for rash.   Allergic/Immunologic: Negative.    Neurological: Negative for weakness and headaches.   Psychiatric/Behavioral: Negative for behavioral problems.   All other systems reviewed and are negative.      Past Medical History:   Diagnosis Date   • Asthma    • COPD (chronic obstructive pulmonary disease) (HCC)    • GERD (gastroesophageal reflux disease)    • Hyperlipidemia        Allergies   Allergen Reactions   • Aspirin Hives   • Bee Venom Swelling   • Erythromycin Hives   • Penicillin G Hives       History reviewed. No pertinent  "surgical history.    Family History   Family history unknown: Yes       Social History     Socioeconomic History   • Marital status:    Tobacco Use   • Smoking status: Current Every Day Smoker   Substance and Sexual Activity   • Alcohol use: Not Currently   • Drug use: Not Currently     Comment: Denies any methamphetamine use           Objective   Physical Exam  Vitals and nursing note reviewed.   Constitutional:       Appearance: Normal appearance. She is well-developed and normal weight. She is not ill-appearing or toxic-appearing.   HENT:      Head: Normocephalic and atraumatic.   Eyes:      Pupils: Pupils are equal, round, and reactive to light.   Cardiovascular:      Rate and Rhythm: Normal rate and regular rhythm.      Pulses: Normal pulses.      Heart sounds: Normal heart sounds. No murmur heard.  Pulmonary:      Effort: Pulmonary effort is normal. No respiratory distress.      Breath sounds: Normal breath sounds. No wheezing.   Abdominal:      General: Bowel sounds are normal. There is no distension.      Palpations: Abdomen is soft.      Tenderness: There is abdominal tenderness. There is no right CVA tenderness or left CVA tenderness.   Musculoskeletal:      Cervical back: Normal range of motion.   Skin:     General: Skin is warm and dry.      Capillary Refill: Capillary refill takes less than 2 seconds.      Findings: No rash.   Neurological:      Mental Status: She is alert and oriented to person, place, and time.   Psychiatric:         Mood and Affect: Mood normal.         Behavior: Behavior normal.         Procedures           ED Course  ED Course as of 08/01/22 1646   Mon Aug 01, 2022   1627 I spoke with BLANCA Christopher who agreed accept patient for Dr. Calhoun. [MC]      ED Course User Index  [MC] Virgie Lr PA    /80   Pulse 62   Temp 98 °F (36.7 °C) (Oral)   Resp 17   Ht 154.9 cm (61\")   Wt 56.2 kg (124 lb)   SpO2 94%   BMI 23.43 kg/m²   Labs Reviewed "   COVID-19,CEPHEID/TAMIA,COR/COLETTE/PAD/TYSHAWN IN-HOUSE,NP SWAB IN TRANSPORT MEDIA 3-4 HR TAT, RT-PCR - Abnormal; Notable for the following components:       Result Value    COVID19 Detected (*)     All other components within normal limits    Narrative:     Fact sheet for providers: https://www.fda.gov/media/609674/download     Fact sheet for patients: https://www.fda.gov/media/199707/download  Fact sheet for providers: https://www.fda.gov/media/631804/download    Fact sheet for patients: https://www.fda.gov/media/759508/download    Test performed by PCR.   COMPREHENSIVE METABOLIC PANEL - Abnormal; Notable for the following components:    Creatinine 0.53 (*)     Potassium 2.6 (*)     ALT (SGPT) 35 (*)     AST (SGOT) 47 (*)     All other components within normal limits    Narrative:     GFR Normal >60  Chronic Kidney Disease <60  Kidney Failure <15     URINALYSIS W/ MICROSCOPIC IF INDICATED (NO CULTURE) - Abnormal; Notable for the following components:    Ketones, UA 15 mg/dL (1+) (*)     Bilirubin, UA Moderate (2+) (*)     Protein, UA 30 mg/dL (1+) (*)     Leuk Esterase, UA Trace (*)     All other components within normal limits   CBC WITH AUTO DIFFERENTIAL - Abnormal; Notable for the following components:    Monocyte % 12.8 (*)     All other components within normal limits   URINALYSIS, MICROSCOPIC ONLY - Abnormal; Notable for the following components:    RBC, UA 0-2 (*)     WBC, UA 6-12 (*)     Bacteria, UA 1+ (*)     Squamous Epithelial Cells, UA 13-20 (*)     Mucus, UA Small/1+ (*)     All other components within normal limits   URINALYSIS W/ CULTURE IF INDICATED - Abnormal; Notable for the following components:    Color, UA Dark Yellow (*)     Ketones, UA 15 mg/dL (1+) (*)     Protein, UA 30 mg/dL (1+) (*)     Leuk Esterase, UA Trace (*)     Urobilinogen, UA 2.0 E.U./dL (*)     All other components within normal limits    Narrative:     In absence of clinical symptoms, the presence of pyuria, bacteria, and/or  nitrites on the urinalysis result does not correlate with infection.   MAGNESIUM - Abnormal; Notable for the following components:    Magnesium 2.8 (*)     All other components within normal limits   URINALYSIS, MICROSCOPIC ONLY - Abnormal; Notable for the following components:    RBC, UA 0-2 (*)     WBC, UA 0-2 (*)     Bacteria, UA Trace (*)     All other components within normal limits   URINE DRUG SCREEN - Abnormal; Notable for the following components:    THC, Screen, Urine Positive (*)     All other components within normal limits    Narrative:     Negative Thresholds Per Drugs Screened:    Amphetamines                 500 ng/ml  Barbiturates                 200 ng/ml  Benzodiazepines              100 ng/ml  Cocaine                      300 ng/ml  Methadone                    300 ng/ml  Opiates                      300 ng/ml  Oxycodone                    100 ng/ml  THC                           50 ng/ml    The Normal Value for all drugs tested is negative. This report includes final unconfirmed screening results to be used for medical treatment purposes only. Unconfirmed results must not be used for non-medical purposes such as employment or legal testing. Clinical consideration should be applied to any drug of abuse test, particularly when unconfirmed results are used.          All urine drugs of abuse requests without chain of custody are for medical screening purposes only.  False positives are possible.     INFLUENZA ANTIGEN, RAPID - Normal   LIPASE - Normal   CBC AND DIFFERENTIAL    Narrative:     The following orders were created for panel order CBC & Differential.  Procedure                               Abnormality         Status                     ---------                               -----------         ------                     CBC Auto Differential[[id number]]        Abnormal            Final result                 Please view results for these tests on the individual orders.     Medications    sodium chloride 0.9 % flush 10 mL (has no administration in time range)   sodium chloride 0.9 % infusion (has no administration in time range)   lactated ringers bolus 1,000 mL (0 mL Intravenous Stopped 8/1/22 1502)   prochlorperazine (COMPAZINE) injection 10 mg (10 mg Intravenous Given 8/1/22 1255)   diphenhydrAMINE (BENADRYL) injection 25 mg (25 mg Intravenous Given 8/1/22 1255)   potassium chloride 10 mEq in 100 mL IVPB (10 mEq Intravenous New Bag 8/1/22 1502)   iopamidol (ISOVUE-370) 76 % injection 100 mL (100 mL Intravenous Given 8/1/22 1500)     CT Abdomen Pelvis With Contrast    Result Date: 8/1/2022  Nonspecific diarrheal disease, with no colonic wall thickening to indicate colitis  Electronically Signed By-Brando Salas On:8/1/2022 3:16 PM This report was finalized on [id number] by  Brando Salas, .                                           MDM  Number of Diagnoses or Management Options  Abdominal pain, unspecified abdominal location  COVID  Hypermagnesemia  Hypokalemia  Nausea and vomiting, unspecified vomiting type  Diagnosis management comments: MEDICAL DECISION  Epic Chart Review: No recent admissions.  Comorbidities: Asthma, COPD, GERD, hyperlipidemia  Differentials: Diverticulitis, UTI, pyelonephritis, COVID, gastroenteritis; this list is not all inclusive and does not constitute the entirety of considered causes  Radiology interpretation:  Images reviewed by me and interpreted by radiologist, as above  Lab interpretation:  Labs viewed by me significant for, as above    While in the ED IV was placed and labs were obtained appropriate PPE was worn during exam and throughout all encounters with the patient.  Patient had the above evaluation.  IV established, lab work obtained.  Patient given 1 L IV fluids, Compazine and Benadryl for abdominal pain and nausea.  Patient reports improvement in her pain.  Urinalysis dark, 1+ ketones, trace leukocytes and bacteria.  No UTI.  Urine drug screen  positive for THC.  CMP creatinine 0.53, potassium 2.6, patient given IV potassium while in the ER due to vomiting.  Lipase normal.  Magnesium elevated 2.8.  COVID-positive.  CT abdomen pelvis shows nonspecific diarrheal disease, no colonic wall thickening to indicate colitis.  Findings discussed with patient at bedside.  Patient will be admitted for IV hydration, electrolyte replacement and reevaluation tomorrow.  Patient is agreeable.  Patient able to drink small sips of liquid without vomiting at this time and reports that she is feeling better.  I spoke with BLANCA Christopher who agreed to accept patient for admission for Dr. Ruiz.  Patient stable on admission.         Amount and/or Complexity of Data Reviewed  Clinical lab tests: reviewed and ordered  Tests in the radiology section of CPT®: reviewed and ordered    Patient Progress  Patient progress: stable      Final diagnoses:   COVID   Abdominal pain, unspecified abdominal location   Nausea and vomiting, unspecified vomiting type   Hypokalemia   Hypermagnesemia       ED Disposition  ED Disposition     ED Disposition   Decision to Admit    Condition   --    Comment   Level of Care: Telemetry [5]   Admitting Physician: HUI RUIZ [[id number]]   Attending Physician: HUI RUIZ [[id number]]               No follow-up provider specified.       Medication List      No changes were made to your prescriptions during this visit.          Virgie Lr PA  08/01/22 [id number]

## 2022-08-01 NOTE — NURSING NOTE
Per MD Calhoun; PRN oral K+ given. Pt stating continuous IV NS 20 MEQ K fluids burn her hand. Nightshift nurse and MD aware.

## 2022-08-01 NOTE — H&P
"    [facility] Medicine Services      Patient Name: Sunitha Louie  : 1980  MRN: [id number]  Primary Care Physician:  Griffin Lynn MD  Date of admission: 2022      Subjective      Chief Complaint: Persistent vomiting, malaise, subjective fever and chills.    History of Present Illness: Sunitha Louie is a 41 y.o. female who presented to [facility] on 2022 complaining of persistent nausea and vomiting with malaise and subjective fever and chills.  The patient denies increased cough or shortness of breath.  She has had no known ill contacts.  In the emergency room the patient was diagnosed with COVID-19.  The patient was also noted to have significantly lowered potassium at 2.6.  The patient reports that she understands the need for potassium replacement and is willing further treatment.  However, she would like to be discharged as soon as possible.  At time of interview the patient was eating and tolerating regular diet.    Review of records with summary: The patient was admitted for CVA 10/30/2020.  The patient had evidence of left MCA occlusion with right-sided weakness and difficulty speaking.  The patient was given tPA and admitted to the ICU.  Echocardiogram with saline study did not show abnormalities.  The patient was discharged on Plavix secondary to aspirin allergy.  30-day event monitor was relatively benign.  The patient is no longer taking Plavix because \"I only had 1 stroke.\"    Review of Systems   Constitutional: Positive for chills, decreased appetite, fever and malaise/fatigue.   Respiratory: Negative for cough and shortness of breath.    Gastrointestinal: Positive for nausea and vomiting. Negative for abdominal pain, diarrhea and hematemesis.   Genitourinary: Negative for dysuria.   All other systems reviewed and are negative.       Personal History     Past Medical History:   Diagnosis Date   • Asthma    • COPD (chronic obstructive pulmonary " disease) (HCC)    • GERD (gastroesophageal reflux disease)    • Hyperlipidemia        History reviewed. No pertinent surgical history.    Family History: Family history is unknown by patient. Otherwise pertinent FHx was reviewed and not pertinent to current issue.    Social History:  reports that she has been smoking. She does not have any smokeless tobacco history on file. She reports previous alcohol use. She reports previous drug use.    Home Medications:  Prior to Admission Medications     Prescriptions Last Dose Informant Patient Reported? Taking?    albuterol sulfate  (90 Base) MCG/ACT inhaler   Yes No    Inhale 2 puffs Every 4 (Four) Hours As Needed for Wheezing.    amLODIPine (NORVASC) 5 MG tablet   No No    Take 1 tablet by mouth Daily.    budesonide-formoterol (SYMBICORT) 160-4.5 MCG/ACT inhaler   Yes No    Inhale 2 puffs 2 (Two) Times a Day.    clopidogrel (PLAVIX) 75 MG tablet   No No    Take 1 tablet by mouth Daily.    lisinopril (PRINIVIL,ZESTRIL) 20 MG tablet   No No    Take 1 tablet by mouth Daily.    metoclopramide (REGLAN) 10 MG tablet   Yes No    Take 10 mg by mouth 2 (two) times a day.    omeprazole (priLOSEC) 40 MG capsule  Self Yes No    Take 40 mg by mouth Daily.            Allergies:  Allergies   Allergen Reactions   • Aspirin Hives   • Bee Venom Swelling   • Erythromycin Hives   • Penicillin G Hives       Objective      Vitals:   Temp:  [98 °F (36.7 °C)] 98 °F (36.7 °C)  Heart Rate:  [58-82] 62  Resp:  [12-17] 17  BP: (136-171)/() 136/80    Physical Exam  Vitals and nursing note reviewed.   Constitutional:       Appearance: Normal appearance.   HENT:      Head: Normocephalic and atraumatic.      Right Ear: External ear normal.      Left Ear: External ear normal.      Nose: No congestion or rhinorrhea.      Mouth/Throat:      Mouth: Mucous membranes are moist.      Comments: The patient has poor dentition  Eyes:      General: No scleral icterus.        Right eye: No discharge.          Left eye: No discharge.      Extraocular Movements: Extraocular movements intact.      Conjunctiva/sclera: Conjunctivae normal.      Pupils: Pupils are equal, round, and reactive to light.   Cardiovascular:      Rate and Rhythm: Normal rate and regular rhythm.      Pulses: Normal pulses.      Heart sounds: Normal heart sounds. No murmur heard.  Pulmonary:      Effort: Pulmonary effort is normal.      Breath sounds: Examination of the left-middle field reveals decreased breath sounds. Decreased breath sounds and wheezing present.      Comments: Rare scattered wheeze.  Abdominal:      General: Bowel sounds are normal.   Musculoskeletal:         General: Normal range of motion.      Cervical back: Normal range of motion and neck supple.      Right lower leg: No edema.      Left lower leg: No edema.   Skin:     General: Skin is warm and dry.      Capillary Refill: Capillary refill takes less than 2 seconds.   Neurological:      General: No focal deficit present.      Mental Status: She is alert and oriented to person, place, and time.   Psychiatric:         Mood and Affect: Mood normal.         Behavior: Behavior normal.         Thought Content: Thought content normal.         Judgment: Judgment normal.          Result Review    Result Review:  I have personally reviewed the results from the time of this admission to 8/1/2022 18:14 EDT and agree with these findings:  [x]  Laboratory  [x]  Microbiology  [x]  Radiology  [x]  EKG/Telemetry   [x]  Cardiology/Vascular   []  Pathology  [x]  Old records  []  Other:  Most notable findings include: Hypokalemia; COVID 19    Assessment & Plan        Active Hospital Problems:  Active Hospital Problems    Diagnosis    • **Hypokalemia    • COVID    • Hypermagnesemia    • Essential hypertension      Plan: Patient will likely discharge once electrolytes corrected    COVID 19 without respiratory symptoms: Enhanced droplet precautions; antiemetics as needed  -Symptoms of nausea and  vomiting    Hypokalemia, marked, potassium 2.6--likely secondary to GI loss: Add potassium placement protocol; IV fluids normal saline with 20 of K at 250 cc/h x 8 hours; repeat BMP in a.m.    Hyper magnesium magnesium 2.8--likely secondary to dehydration: IV fluids; repeat magnesium in a.m.    Hypertension, chronic, uncontrolled secondary to noncompliance: Add Norvasc  -Patient does not have a current prescription    Minimally elevated LFTs--likely secondary to reflux from vomiting: Repeat CMP in a.m.  -Lipase 22    Stroke secondary prophylaxis, chronic, uncontrolled secondary to noncompliance: Add Plavix 75 mg daily  -Patient does not have a current prescription    GERD, chronic: Continue Protonix      DVT prophylaxis:  Medical DVT prophylaxis orders are present.    CODE STATUS:    Level Of Support Discussed With: Patient  Code Status (Patient has no pulse and is not breathing): CPR (Attempt to Resuscitate)  Medical Interventions (Patient has pulse or is breathing): Full Support    Admission Status:  I believe this patient meets observation status.    I discussed the patient's findings and my recommendations with patient and nursing staff.    This patient has been examined wearing appropriate Personal Protective Equipment. 08/01/22      Signature: Electronically signed by AYDEN Krishna, 08/01/22, 8:41 PM EDT.

## 2022-08-02 ENCOUNTER — READMISSION MANAGEMENT (OUTPATIENT)
Dept: CALL CENTER | Facility: HOSPITAL | Age: 42
End: 2022-08-02

## 2022-08-02 VITALS
TEMPERATURE: 97.6 F | WEIGHT: 124 LBS | RESPIRATION RATE: 10 BRPM | OXYGEN SATURATION: 99 % | HEART RATE: 54 BPM | HEIGHT: 61 IN | SYSTOLIC BLOOD PRESSURE: 146 MMHG | BODY MASS INDEX: 23.41 KG/M2 | DIASTOLIC BLOOD PRESSURE: 99 MMHG

## 2022-08-02 PROBLEM — D89.831 CYTOKINE RELEASE SYNDROME, GRADE 1: Status: ACTIVE | Noted: 2022-08-02

## 2022-08-02 LAB
ALBUMIN SERPL-MCNC: 3.5 G/DL (ref 3.5–5.2)
ALBUMIN/GLOB SERPL: 1.8 G/DL
ALP SERPL-CCNC: 76 U/L (ref 39–117)
ALT SERPL W P-5'-P-CCNC: 33 U/L (ref 1–33)
ANION GAP SERPL CALCULATED.3IONS-SCNC: 10 MMOL/L (ref 5–15)
AST SERPL-CCNC: 44 U/L (ref 1–32)
BILIRUB SERPL-MCNC: 0.3 MG/DL (ref 0–1.2)
BUN SERPL-MCNC: 3 MG/DL (ref 6–20)
BUN/CREAT SERPL: 7.7 (ref 7–25)
CALCIUM SPEC-SCNC: 8.8 MG/DL (ref 8.6–10.5)
CHLORIDE SERPL-SCNC: 109 MMOL/L (ref 98–107)
CO2 SERPL-SCNC: 26 MMOL/L (ref 22–29)
CREAT SERPL-MCNC: 0.39 MG/DL (ref 0.57–1)
EGFRCR SERPLBLD CKD-EPI 2021: 126.9 ML/MIN/1.73
GLOBULIN UR ELPH-MCNC: 2 GM/DL
GLUCOSE SERPL-MCNC: 88 MG/DL (ref 65–99)
MAGNESIUM SERPL-MCNC: 2.3 MG/DL (ref 1.6–2.6)
POTASSIUM SERPL-SCNC: 3.1 MMOL/L (ref 3.5–5.2)
PROT SERPL-MCNC: 5.5 G/DL (ref 6–8.5)
SODIUM SERPL-SCNC: 145 MMOL/L (ref 136–145)

## 2022-08-02 PROCEDURE — G0378 HOSPITAL OBSERVATION PER HR: HCPCS

## 2022-08-02 PROCEDURE — 80053 COMPREHEN METABOLIC PANEL: CPT | Performed by: NURSE PRACTITIONER

## 2022-08-02 PROCEDURE — 99217 PR OBSERVATION CARE DISCHARGE MANAGEMENT: CPT | Performed by: INTERNAL MEDICINE

## 2022-08-02 PROCEDURE — 96366 THER/PROPH/DIAG IV INF ADDON: CPT

## 2022-08-02 PROCEDURE — 83735 ASSAY OF MAGNESIUM: CPT | Performed by: INTERNAL MEDICINE

## 2022-08-02 RX ORDER — ONDANSETRON 4 MG/1
4 TABLET, FILM COATED ORAL EVERY 6 HOURS PRN
Qty: 15 TABLET | Refills: 0 | Status: SHIPPED | OUTPATIENT
Start: 2022-08-02

## 2022-08-02 RX ORDER — POTASSIUM CHLORIDE 20 MEQ/1
40 TABLET, EXTENDED RELEASE ORAL ONCE
Status: COMPLETED | OUTPATIENT
Start: 2022-08-02 | End: 2022-08-02

## 2022-08-02 RX ORDER — CLOPIDOGREL BISULFATE 75 MG/1
75 TABLET ORAL DAILY
Qty: 30 TABLET | Refills: 0 | Status: SHIPPED | OUTPATIENT
Start: 2022-08-02

## 2022-08-02 RX ORDER — AMLODIPINE BESYLATE 5 MG/1
5 TABLET ORAL
Qty: 30 TABLET | Refills: 0 | Status: SHIPPED | OUTPATIENT
Start: 2022-08-02

## 2022-08-02 RX ADMIN — CLOPIDOGREL BISULFATE 75 MG: 75 TABLET ORAL at 12:45

## 2022-08-02 RX ADMIN — AMLODIPINE BESYLATE 5 MG: 5 TABLET ORAL at 12:45

## 2022-08-02 RX ADMIN — POTASSIUM CHLORIDE 40 MEQ: 1500 TABLET, EXTENDED RELEASE ORAL at 12:45

## 2022-08-02 RX ADMIN — SODIUM CHLORIDE 100 ML/HR: 9 INJECTION, SOLUTION INTRAVENOUS at 02:08

## 2022-08-02 RX ADMIN — PANTOPRAZOLE SODIUM 40 MG: 40 TABLET, DELAYED RELEASE ORAL at 06:23

## 2022-08-02 NOTE — DISCHARGE SUMMARY
"             [facility] Medicine Services  DISCHARGE SUMMARY    Patient Name: Sunitha Louie  : 1980  MRN: [id number]    Date of Admission: 2022  Date of Discharge: 2022  Primary Care Physician: Griffin Lynn MD      Presenting Problem:   Hypokalemia [E87.6]  Hypermagnesemia [E83.41]  Abdominal pain, unspecified abdominal location [R10.9]  COVID [U07.1]  Nausea and vomiting, unspecified vomiting type [R11.2]    Active and Resolved Hospital Problems:  Active Hospital Problems    Diagnosis POA   • **Hypokalemia [E87.6] Yes   • Cytokine release syndrome, grade 1 [D89.831] No   • COVID [U07.1] Yes   • Hypermagnesemia [E83.41] Yes   • Essential hypertension [I10] Yes      Resolved Hospital Problems   No resolved problems to display.         Hospital Course     Hospital Course:  Sunitha Louie is a 41 y.o. female who presented to [facility] on 2022 complaining of persistent nausea and vomiting with malaise and subjective fever and chills.  The patient denies increased cough or shortness of breath.  She has had no known ill contacts.  In the emergency room the patient was diagnosed with COVID-19.  The patient was also noted to have significantly lowered potassium at 2.6.  Patient was started on regular diet and tolerated well, no nausea or vomiting further noted.  No fever noted.  Potassium replaced and rechecked with improvement.  Of note, Review of records with summary: The patient was admitted for CVA 10/30/2020.  The patient had evidence of left MCA occlusion with right-sided weakness and difficulty speaking.  The patient was given tPA and admitted to the ICU.  Echocardiogram with saline study did not show abnormalities.  The patient was discharged on Plavix secondary to aspirin allergy.  30-day event monitor was relatively benign.  The patient is no longer taking Plavix because \"I only had 1 stroke.\"  Patient was started on amlodipine for hypertension and Plavix " also resumed.  Prescription given on discharge.  She feels significant better and wants to go home today.  No respiratory symptoms noted for COVID.  Patient is clinically improved and stable to discharge home with follow-up with PCP as outpatient.        DISCHARGE Follow Up Recommendations for labs and diagnostics:   Follow-up with PCP within 1 week    Reasons For Change In Medications and Indications for New Medications:  Medications added as below.    Day of Discharge     Vital Signs:  Temp:  [97.5 °F (36.4 °C)-97.9 °F (36.6 °C)] 97.6 °F (36.4 °C)  Heart Rate:  [54-68] 54  Resp:  [10-18] 10  BP: (136-166)/(74-99) 146/99    Physical Exam:  General: Awake, alert, NAD  Eyes: PERRL, EOMI, conjunctive are clear  Cardiovascular: Regular rate and rhythm, no murmurs  Respiratory: Clear to auscultation bilaterally, no wheezing or rales, unlabored breathing  Abdomen: Soft, nontender, positive bowel sounds, no guarding  Neurologic: A&O, CN grossly intact, moves all extremities spontaneously  Musculoskeletal: Normal range of motion, no deformities  Skin: Warm, dry, intact        Pertinent  and/or Most Recent Results     LAB RESULTS:      Lab 08/01/22  1258   WBC 6.20   HEMOGLOBIN 12.9   HEMATOCRIT 38.9   PLATELETS 202   NEUTROS ABS 3.50   LYMPHS ABS 1.80   MONOS ABS 0.80   EOS ABS 0.00   MCV 85.0         Lab 08/02/22  1118 08/01/22  1341   SODIUM 145 145   POTASSIUM 3.1* 2.6*   CHLORIDE 109* 103   CO2 26.0 28.0   ANION GAP 10.0 14.0   BUN 3* 8   CREATININE 0.39* 0.53*   EGFR 126.9 117.9   GLUCOSE 88 91   CALCIUM 8.8 10.2   MAGNESIUM 2.3 2.8*         Lab 08/02/22  1118 08/01/22  1341   TOTAL PROTEIN 5.5* 7.1   ALBUMIN 3.50 4.20   GLOBULIN 2.0 2.9   ALT (SGPT) 33 35*   AST (SGOT) 44* 47*   BILIRUBIN 0.3 0.5   ALK PHOS 76 92   LIPASE  --  22                     Brief Urine Lab Results  (Last result in the past 365 days)      Color   Clarity   Blood   Leuk Est   Nitrite   Protein   CREAT   Urine HCG        08/01/22 1431 Dark  Yellow   Clear   Negative   Trace   Negative   30 mg/dL (1+)               Microbiology Results (last 10 days)     Procedure Component Value - Date/Time    COVID-19,CEPHEID/TAMIA,COR/COLETTE/PAD/TYSHAWN IN-HOUSE(OR EMERGENT/ADD-ON),NP SWAB IN TRANSPORT MEDIA 3-4 HR TAT, RT-PCR - Swab, Nasopharynx [[id number]]  (Abnormal) Collected: 08/01/22 1258    Lab Status: Final result Specimen: Swab from Nasopharynx Updated: 08/01/22 1330     COVID19 Detected    Narrative:      Fact sheet for providers: https://www.fda.gov/media/058207/download     Fact sheet for patients: https://www.fda.gov/media/399725/download  Fact sheet for providers: https://www.fda.gov/media/081424/download    Fact sheet for patients: https://www.fda.gov/media/426225/download    Test performed by PCR.    Influenza Antigen, Rapid - Swab, Nasopharynx [[id number]]  (Normal) Collected: 08/01/22 1258    Lab Status: Final result Specimen: Swab from Nasopharynx Updated: 08/01/22 1330     Influenza A PCR Not Detected     Influenza B PCR Not Detected          CT Abdomen Pelvis With Contrast    Result Date: 8/1/2022  Impression: Nonspecific diarrheal disease, with no colonic wall thickening to indicate colitis  Electronically Signed By-Brando Salas On:8/1/2022 3:16 PM This report was finalized on [id number] by  Brando Salas, .              Results for orders placed during the hospital encounter of 10/30/20    Adult Transthoracic Echo Complete W/ Cont if Necessary Per Protocol    Interpretation Summary  · Estimated left ventricular EF was in agreement with the calculated left ventricular EF. Left ventricular ejection fraction appears to be 61 - 65%. Left ventricular systolic function is normal.  · Trace to mild mitral valve regurgitation is present.  · Negative agitated saline contrast bubble study for ASD/VSD shunting  · No cardioembolic etiology identified by echo exam      Labs Pending at Discharge:      Procedures Performed           Consults:   Consults     No  orders found for last 30 day(s).            Discharge Details        Discharge Medications      New Medications      Instructions Start Date   amLODIPine 5 MG tablet  Commonly known as: NORVASC   5 mg, Oral, Every 24 Hours Scheduled      clopidogrel 75 MG tablet  Commonly known as: PLAVIX   75 mg, Oral, Daily      ondansetron 4 MG tablet  Commonly known as: ZOFRAN   4 mg, Oral, Every 6 Hours PRN         Continue These Medications      Instructions Start Date   omeprazole 40 MG capsule  Commonly known as: priLOSEC   40 mg, Oral, Daily             Allergies   Allergen Reactions   • Aspirin Hives   • Bee Venom Swelling   • Erythromycin Hives   • Penicillin G Hives         Discharge Disposition:  Home or Self Care    Diet:  Hospital:  Diet Order   Procedures   • Diet Gastrointestinal, Texture; Low Residue; Soft to Chew         Discharge Activity:         CODE STATUS:  Code Status and Medical Interventions:   Ordered at: 08/01/22 1807     Level Of Support Discussed With:    Patient     Code Status (Patient has no pulse and is not breathing):    CPR (Attempt to Resuscitate)     Medical Interventions (Patient has pulse or is breathing):    Full Support         No future appointments.        Time spent on Discharge including face to face service:  25 minutes    Signature:    Electronically signed by Carmen Calhoun DO, 08/02/22, 12:15 PM EDT.

## 2022-08-02 NOTE — PLAN OF CARE
Goal Outcome Evaluation:  Plan of Care Reviewed With: patient           Outcome Evaluation: Pt admitted for hypokalemia and covid. Currently on room air w/ no complaints. Pt currently getting IVF at this time. Potassium has been replaced with oral meds and IVF. Will continue to monitor.

## 2022-08-02 NOTE — CASE MANAGEMENT/SOCIAL WORK
Case Management Discharge Note      Final Note: Home         Selected Continued Care - Discharged on 8/2/2022 Admission date: 8/1/2022 - Discharge disposition: Home or Self Care   Transportation Services  Private: Car    Final Discharge Disposition Code: 01 - home or self-care

## 2022-08-02 NOTE — CASE MANAGEMENT/SOCIAL WORK
Discharge Planning Assessment  [facility]     Patient Name: Sunitha Louie  MRN: [id number]  Today's Date: 8/2/2022    Admit Date: 8/1/2022     Discharge Needs Assessment     Row Name 08/02/22 1357       Living Environment    People in Home significant other    Name(s) of People in Home Significant other- Armando    Current Living Arrangements home    Primary Care Provided by self    Provides Primary Care For no one    Family Caregiver if Needed significant other    Family Caregiver Names Armando    Quality of Family Relationships supportive    Able to Return to Prior Arrangements yes       Resource/Environmental Concerns    Resource/Environmental Concerns none    Transportation Concerns none       Transition Planning    Patient/Family Anticipates Transition to home    Patient/Family Anticipated Services at Transition none    Transportation Anticipated family or friend will provide       Discharge Needs Assessment    Equipment Currently Used at Home none    Concerns to be Addressed discharge planning    Anticipated Changes Related to Illness none    Equipment Needed After Discharge none               Discharge Plan     Row Name 08/02/22 1358       Plan    Plan DC Plan: Routine to home    Patient/Family in Agreement with Plan yes    Plan Comments Due to Covid poritive precautions CM called the patient.She reports that she livees at home with her significant other. PCP and Pharmacy verified. She reports no DME/HH/PT. She reports that she is IADL's at home. She reports no issues paying for medications and no transportation issues. She reports that her significant other will provide transport at discharge.           Expected Discharge Date and Time     Expected Discharge Date Expected Discharge Time    Aug 2, 2022          Demographic Summary     Row Name 08/02/22 1357       General Information    Admission Type observation    Arrived From emergency department    Referral Source admission list    Reason for Consult  discharge planning    Preferred Language English       Contact Information    Permission Granted to Share Info With                Functional Status     Row Name 08/02/22 5032       Functional Status    Usual Activity Tolerance good    Current Activity Tolerance good       Functional Status, IADL    Medications independent    Meal Preparation independent    Housekeeping independent    Laundry independent    Shopping independent       Mental Status    General Appearance WDL WDL       Mental Status Summary    Recent Changes in Mental Status/Cognitive Functioning no changes         Phone communication or documentation only- no physical contact with patient or family.    Naomy Chino RN     Office Phone: [phone]  Office Cell: [phone]

## 2022-08-03 ENCOUNTER — READMISSION MANAGEMENT (OUTPATIENT)
Dept: CALL CENTER | Facility: HOSPITAL | Age: 42
End: 2022-08-03

## 2022-08-03 NOTE — OUTREACH NOTE
COVID-19 Week 1 Survey    Flowsheet Row Responses   [facility] patient discharged from? [facility]   Does the patient have one of the following disease processes/diagnoses(primary or secondary)? COVID-19   COVID-19 underlying condition? None   Call Number Call 1   Week 1 Call successful? Yes   Call start time 1123   Call end time 1128   Discharge diagnosis Covid    Meds reviewed with patient/caregiver? Yes   Is the patient having any side effects they believe may be caused by any medication additions or changes? No   Does the patient have all medications ordered at discharge? Yes   Is the patient taking all medications as directed (includes completed medication regime)? Yes   Does the patient have a primary care provider?  Yes   Does the patient have an appointment with their PCP or specialist within 7 days of discharge? No   What is preventing the patient from scheduling follow up appointments within 7 days of discharge? Haven't had time   Nursing Interventions Educated patient on importance of making appointment, Advised patient to make appointment   Has the patient kept scheduled appointments due by today? N/A   Psychosocial issues? No   Did the patient receive a copy of their discharge instructions? Yes   Did the patient receive a copy of COVID-19 specific instructions? Yes   Nursing interventions Reviewed instructions with patient   What is the patient's perception of their health status since discharge? Improving   Does the patient have any of the following symptoms? Cough  [achy all over]   Nursing Interventions Nurse provided patient education   Pulse Ox monitoring None   Is the patient/caregiver able to teach back steps to recovery at home? Set small, achievable goals for return to baseline health, Rest and rebuild strength, gradually increase activity, Make a list of questions for provider's appointment   If the patient is a current smoker, are they able to teach back resources for cessation? Smoking  cessation medications   Is the patient/caregiver able to teach back the hierarchy of who to call/visit for symptoms/problems? PCP, Specialist, Home health nurse, Urgent Care, ED, 911 Yes   COVID-19 call completed? Yes   Wrap up additional comments Patient reports she is very achy all over. Coughs in morning which is normal for her she states. Denies SOB.          PATRICE HERMAN - Registered Nurse

## 2022-08-03 NOTE — OUTREACH NOTE
Prep Survey    Flowsheet Row Responses   [facility] discharged from? [facility]   Is LACE score < 7 ? Yes   Emergency Room discharge w/ pulse ox? No   Eligibility Readm Mgmt   Discharge diagnosis Covid    Does the patient have one of the following disease processes/diagnoses(primary or secondary)? COVID-19   Does the patient have Home health ordered? No   Is there a DME ordered? No   Prep survey completed? Yes          JORGE ROSA - Registered Nurse

## 2022-08-05 ENCOUNTER — READMISSION MANAGEMENT (OUTPATIENT)
Dept: CALL CENTER | Facility: HOSPITAL | Age: 42
End: 2022-08-05

## 2022-08-12 ENCOUNTER — READMISSION MANAGEMENT (OUTPATIENT)
Dept: CALL CENTER | Facility: HOSPITAL | Age: 42
End: 2022-08-12

## 2022-08-12 NOTE — OUTREACH NOTE
COVID-19 Week 2 Survey    Flowsheet Row Responses   [facility] patient discharged from? [facility]   Does the patient have one of the following disease processes/diagnoses(primary or secondary)? COVID-19   COVID-19 underlying condition? None   Call Number Call 1   COVID-19 Week 2: Call 1 attempt successful? Yes   Call start time 1245   Call end time 1252   Discharge diagnosis Covid    Person spoke with today (if not patient) and relationship patient   Meds reviewed with patient/caregiver? Yes   Is the patient having any side effects they believe may be caused by any medication additions or changes? No   Does the patient have all medications ordered at discharge? Yes   Is the patient taking all medications as directed (includes completed medication regime)? Yes   Does the patient have a primary care provider?  Yes   Does the patient have an appointment with their PCP or specialist within 7 days of discharge? No   Nursing Interventions Educated patient on importance of making appointment, Advised patient to make appointment   Has the patient kept scheduled appointments due by today? N/A   Psychosocial issues? No   What is the patient's perception of their health status since discharge? Improving   Does the patient have any of the following symptoms? None   Nursing Interventions Nurse provided patient education   Pulse Ox monitoring None   Is the patient/caregiver able to teach back steps to recovery at home? Rest and rebuild strength, gradually increase activity, Eat a well-balance diet   COVID-19 call completed? Yes   Wrap up additional comments Pt states she has no s/s of covid, but report eyelids, hands, and legs are swollen. Pt advised to report swollen eyelids, hands, and legs to PCP, but pt states she is not able to get a hold of PCP office as nobody answers. Pt advised to go to ED if swelling worsens,  pt verbalized understanding. Pt was offered number to obtain another PCP, but pt wants to contact her  insurance to receive help finding another PCP.          RADHA HARDING - Registered Nurse

## 2023-01-26 ENCOUNTER — TRANSCRIBE ORDERS (OUTPATIENT)
Dept: ADMINISTRATIVE | Facility: HOSPITAL | Age: 43
End: 2023-01-26
Payer: MEDICAID

## 2023-01-26 DIAGNOSIS — Z12.31 VISIT FOR SCREENING MAMMOGRAM: Primary | ICD-10-CM

## 2023-02-10 ENCOUNTER — HOSPITAL ENCOUNTER (OUTPATIENT)
Dept: MAMMOGRAPHY | Facility: HOSPITAL | Age: 43
Discharge: HOME | End: 2023-02-10
Admitting: OBSTETRICS & GYNECOLOGY
Payer: MEDICAID

## 2023-02-10 DIAGNOSIS — Z12.31 VISIT FOR SCREENING MAMMOGRAM: ICD-10-CM

## 2023-02-10 PROCEDURE — 77063 BREAST TOMOSYNTHESIS BI: CPT

## 2023-02-10 PROCEDURE — 77067 SCR MAMMO BI INCL CAD: CPT

## 2023-03-20 ENCOUNTER — HOSPITAL ENCOUNTER (OUTPATIENT)
Dept: ULTRASOUND IMAGING | Facility: HOSPITAL | Age: 43
Discharge: HOME | End: 2023-03-20
Payer: MEDICAID

## 2023-03-20 ENCOUNTER — HOSPITAL ENCOUNTER (OUTPATIENT)
Dept: MAMMOGRAPHY | Facility: HOSPITAL | Age: 43
Discharge: HOME | End: 2023-03-20
Payer: MEDICAID

## 2023-03-20 DIAGNOSIS — N64.89 BREAST ASYMMETRY: ICD-10-CM

## 2023-03-20 PROCEDURE — 77065 DX MAMMO INCL CAD UNI: CPT

## 2023-03-20 PROCEDURE — G0279 TOMOSYNTHESIS, MAMMO: HCPCS

## 2024-04-26 ENCOUNTER — TRANSCRIBE ORDERS (OUTPATIENT)
Dept: ADMINISTRATIVE | Facility: HOSPITAL | Age: 44
End: 2024-04-26
Payer: MEDICAID

## 2024-04-26 DIAGNOSIS — Z12.31 SCREENING MAMMOGRAM FOR BREAST CANCER: Primary | ICD-10-CM

## 2024-05-10 ENCOUNTER — HOSPITAL ENCOUNTER (OUTPATIENT)
Dept: MAMMOGRAPHY | Facility: HOSPITAL | Age: 44
Discharge: HOME | End: 2024-05-10
Payer: MEDICAID

## 2024-05-10 DIAGNOSIS — Z12.31 SCREENING MAMMOGRAM FOR BREAST CANCER: ICD-10-CM

## 2024-05-10 PROCEDURE — 77063 BREAST TOMOSYNTHESIS BI: CPT

## 2024-05-10 PROCEDURE — 77067 SCR MAMMO BI INCL CAD: CPT

## 2024-05-16 ENCOUNTER — LAB (OUTPATIENT)
Dept: LAB | Facility: HOSPITAL | Age: 44
End: 2024-05-16
Payer: MEDICAID

## 2024-05-16 ENCOUNTER — TRANSCRIBE ORDERS (OUTPATIENT)
Dept: ADMINISTRATIVE | Facility: HOSPITAL | Age: 44
End: 2024-05-16
Payer: MEDICAID

## 2024-05-16 DIAGNOSIS — R41.3 MEMORY LOSS: Primary | ICD-10-CM

## 2024-05-16 DIAGNOSIS — R41.3 MEMORY LOSS: ICD-10-CM

## 2024-05-16 LAB
ALBUMIN SERPL-MCNC: 3.8 G/DL (ref 3.5–5.2)
ALBUMIN/GLOB SERPL: 1.7 G/DL
ALP SERPL-CCNC: 105 U/L (ref 39–117)
ALT SERPL W P-5'-P-CCNC: 15 U/L (ref 1–33)
AMMONIA BLD-SCNC: 36 UMOL/L (ref 11–51)
ANION GAP SERPL CALCULATED.3IONS-SCNC: 9.5 MMOL/L (ref 5–15)
AST SERPL-CCNC: 18 U/L (ref 1–32)
BASOPHILS # BLD AUTO: 0.03 10*3/MM3 (ref 0–0.2)
BASOPHILS NFR BLD AUTO: 0.4 % (ref 0–1.5)
BILIRUB SERPL-MCNC: <0.2 MG/DL (ref 0–1.2)
BUN SERPL-MCNC: 2 MG/DL (ref 6–20)
BUN/CREAT SERPL: 4 (ref 7–25)
CALCIUM SPEC-SCNC: 9.7 MG/DL (ref 8.6–10.5)
CHLORIDE SERPL-SCNC: 108 MMOL/L (ref 98–107)
CO2 SERPL-SCNC: 24.5 MMOL/L (ref 22–29)
CREAT SERPL-MCNC: 0.5 MG/DL (ref 0.57–1)
DEPRECATED RDW RBC AUTO: 48.5 FL (ref 37–54)
EGFRCR SERPLBLD CKD-EPI 2021: 118 ML/MIN/1.73
EOSINOPHIL # BLD AUTO: 0.06 10*3/MM3 (ref 0–0.4)
EOSINOPHIL NFR BLD AUTO: 0.8 % (ref 0.3–6.2)
ERYTHROCYTE [DISTWIDTH] IN BLOOD BY AUTOMATED COUNT: 15.7 % (ref 12.3–15.4)
FOLATE SERPL-MCNC: 5.69 NG/ML (ref 4.78–24.2)
GLOBULIN UR ELPH-MCNC: 2.3 GM/DL
GLUCOSE SERPL-MCNC: 86 MG/DL (ref 65–99)
HCT VFR BLD AUTO: 34.1 % (ref 34–46.6)
HGB BLD-MCNC: 11 G/DL (ref 12–15.9)
HIV 1+2 AB+HIV1 P24 AG SERPL QL IA: NORMAL
IMM GRANULOCYTES # BLD AUTO: 0.03 10*3/MM3 (ref 0–0.05)
IMM GRANULOCYTES NFR BLD AUTO: 0.4 % (ref 0–0.5)
LYMPHOCYTES # BLD AUTO: 1.87 10*3/MM3 (ref 0.7–3.1)
LYMPHOCYTES NFR BLD AUTO: 24.7 % (ref 19.6–45.3)
MAGNESIUM SERPL-MCNC: 2.4 MG/DL (ref 1.6–2.6)
MCH RBC QN AUTO: 27.6 PG (ref 26.6–33)
MCHC RBC AUTO-ENTMCNC: 32.3 G/DL (ref 31.5–35.7)
MCV RBC AUTO: 85.7 FL (ref 79–97)
MONOCYTES # BLD AUTO: 0.66 10*3/MM3 (ref 0.1–0.9)
MONOCYTES NFR BLD AUTO: 8.7 % (ref 5–12)
NEUTROPHILS NFR BLD AUTO: 4.93 10*3/MM3 (ref 1.7–7)
NEUTROPHILS NFR BLD AUTO: 65 % (ref 42.7–76)
NRBC BLD AUTO-RTO: 0 /100 WBC (ref 0–0.2)
PLATELET # BLD AUTO: 197 10*3/MM3 (ref 140–450)
PMV BLD AUTO: 11.3 FL (ref 6–12)
POTASSIUM SERPL-SCNC: 3.7 MMOL/L (ref 3.5–5.2)
PROT SERPL-MCNC: 6.1 G/DL (ref 6–8.5)
RBC # BLD AUTO: 3.98 10*6/MM3 (ref 3.77–5.28)
SODIUM SERPL-SCNC: 142 MMOL/L (ref 136–145)
TSH SERPL DL<=0.05 MIU/L-ACNC: 1.36 UIU/ML (ref 0.27–4.2)
VIT B12 BLD-MCNC: 529 PG/ML (ref 211–946)
WBC NRBC COR # BLD AUTO: 7.58 10*3/MM3 (ref 3.4–10.8)

## 2024-05-16 PROCEDURE — 83655 ASSAY OF LEAD: CPT

## 2024-05-16 PROCEDURE — 80050 GENERAL HEALTH PANEL: CPT

## 2024-05-16 PROCEDURE — 83921 ORGANIC ACID SINGLE QUANT: CPT

## 2024-05-16 PROCEDURE — 83825 ASSAY OF MERCURY: CPT

## 2024-05-16 PROCEDURE — 82140 ASSAY OF AMMONIA: CPT

## 2024-05-16 PROCEDURE — 86592 SYPHILIS TEST NON-TREP QUAL: CPT

## 2024-05-16 PROCEDURE — 83735 ASSAY OF MAGNESIUM: CPT

## 2024-05-16 PROCEDURE — 36415 COLL VENOUS BLD VENIPUNCTURE: CPT

## 2024-05-16 PROCEDURE — 82607 VITAMIN B-12: CPT

## 2024-05-16 PROCEDURE — 82746 ASSAY OF FOLIC ACID SERUM: CPT

## 2024-05-16 PROCEDURE — 82175 ASSAY OF ARSENIC: CPT

## 2024-05-16 PROCEDURE — G0432 EIA HIV-1/HIV-2 SCREEN: HCPCS

## 2024-05-17 LAB — RPR SER QL: NORMAL

## 2024-05-20 LAB — METHYLMALONATE SERPL-SCNC: 161 NMOL/L (ref 0–378)

## 2024-05-24 LAB
ARSENIC BLD-MCNC: 1 UG/L (ref 0–9)
LEAD BLDV-MCNC: <1 UG/DL (ref 0–3.4)
MERCURY BLD-MCNC: <1 UG/L (ref 0–14.9)

## 2024-09-04 NOTE — OUTREACH NOTE
COVID-19 Week 1 Survey    Flowsheet Row Responses   [facility] patient discharged from? [facility]   Does the patient have one of the following disease processes/diagnoses(primary or secondary)? COVID-19   COVID-19 underlying condition? None   Call Number Call 2   Week 1 Call successful? Yes   Call start time 1011   Call end time 1013   Discharge diagnosis Covid    Meds reviewed with patient/caregiver? Yes   Is the patient taking all medications as directed (includes completed medication regime)? Yes   Has the patient kept scheduled appointments due by today? N/A   Psychosocial issues? No   What is the patient's perception of their health status since discharge? Improving   Does the patient have any of the following symptoms? None  [(nauseous) per pt ]   Pulse Ox monitoring None  [insurance won't cover per pt ]   COVID-19 call completed? Yes          CAROLE SPAIN - Registered Nurse  
No

## 2025-07-19 ENCOUNTER — HOSPITAL ENCOUNTER (EMERGENCY)
Facility: HOSPITAL | Age: 45
Discharge: HOME | End: 2025-07-19
Attending: EMERGENCY MEDICINE
Payer: MEDICAID

## 2025-07-19 VITALS
OXYGEN SATURATION: 96 % | SYSTOLIC BLOOD PRESSURE: 162 MMHG | RESPIRATION RATE: 17 BRPM | DIASTOLIC BLOOD PRESSURE: 81 MMHG | WEIGHT: 123.68 LBS | HEIGHT: 61 IN | TEMPERATURE: 97.3 F | HEART RATE: 61 BPM | BODY MASS INDEX: 23.35 KG/M2

## 2025-07-19 DIAGNOSIS — R68.84 JAW PAIN: Primary | ICD-10-CM

## 2025-07-19 PROCEDURE — 99282 EMERGENCY DEPT VISIT SF MDM: CPT

## 2025-07-19 RX ORDER — CEPHALEXIN 500 MG/1
500 CAPSULE ORAL 3 TIMES DAILY
Qty: 21 CAPSULE | Refills: 0 | Status: SHIPPED | OUTPATIENT
Start: 2025-07-19 | End: 2025-07-19

## 2025-07-19 RX ORDER — ACETAMINOPHEN AND CODEINE PHOSPHATE 300; 30 MG/1; MG/1
1 TABLET ORAL EVERY 4 HOURS PRN
Qty: 12 TABLET | Refills: 0 | Status: SHIPPED | OUTPATIENT
Start: 2025-07-19 | End: 2025-07-19

## 2025-07-19 RX ORDER — CEPHALEXIN 500 MG/1
500 CAPSULE ORAL 3 TIMES DAILY
Qty: 21 CAPSULE | Refills: 0 | Status: SHIPPED | OUTPATIENT
Start: 2025-07-19

## 2025-07-19 RX ORDER — ACETAMINOPHEN AND CODEINE PHOSPHATE 300; 30 MG/1; MG/1
1 TABLET ORAL EVERY 4 HOURS PRN
Qty: 12 TABLET | Refills: 0 | Status: SHIPPED | OUTPATIENT
Start: 2025-07-19

## 2025-07-19 NOTE — ED NOTES
PT refused to get in a patient gown for examination - PT told the implications for the gown and still refused with understanding

## 2025-07-19 NOTE — ED PROVIDER NOTES
Subjective   History of Present Illness  Patient is a 44-year-old female complaint swelling to the right side of her face with pain.  This developed over the past 24 hours.  She denies other associated complaints.      Review of Systems    Past Medical History:   Diagnosis Date    Asthma     COPD (chronic obstructive pulmonary disease)     GERD (gastroesophageal reflux disease)     Hyperlipidemia        Allergies   Allergen Reactions    Aspirin Hives    Bee Venom Swelling    Erythromycin Hives    Penicillin G Hives       No past surgical history on file.    Family History   Family history unknown: Yes       Social History     Socioeconomic History    Marital status:    Tobacco Use    Smoking status: Every Day   Substance and Sexual Activity    Alcohol use: Not Currently    Drug use: Not Currently     Comment: Denies any methamphetamine use           Objective   Physical Exam  Facial exam suspicious swelling and redness with induration over the right cheek.  Nares are clear.  Oropharynx shows the patient being mostly edentulous however there are 4 5 remaining necrotic teeth in the upper dentition on the right.  There is no purulence or drainage noted.  Neck has no neuropathy or stridor.  Procedures           ED Course                                                       Medical Decision Making  Patiently discharged with a diagnosis of jaw pain secondary to dental disease.  She will be placed on Amoxil and Naprosyn.  She will follow-up with her dentist.    Risk  Prescription drug management.        Final diagnoses:   Jaw pain       ED Disposition  ED Disposition       ED Disposition   Discharge    Condition   Stable    Comment   --               No follow-up provider specified.       Medication List        New Prescriptions      acetaminophen-codeine 300-30 MG per tablet  Commonly known as: TYLENOL with CODEINE #3  Take 1 tablet by mouth Every 4 (Four) Hours As Needed for Moderate Pain.     cephalexin 500 MG  "capsule  Commonly known as: KEFLEX  Take 1 capsule by mouth 3 (Three) Times a Day.               Where to Get Your Medications        These medications were sent to [facility] \"Compounds [facility]\" - [place], [place] - [address] - [phone]  - [phone]   [address] [place] [address]      Phone: [phone]   acetaminophen-codeine 300-30 MG per tablet  cephalexin 500 MG capsule            Contreras Whitney MD  07/19/25 1932    "